# Patient Record
Sex: MALE | Race: WHITE | NOT HISPANIC OR LATINO | Employment: FULL TIME | URBAN - NONMETROPOLITAN AREA
[De-identification: names, ages, dates, MRNs, and addresses within clinical notes are randomized per-mention and may not be internally consistent; named-entity substitution may affect disease eponyms.]

---

## 2018-02-26 ENCOUNTER — HOSPITAL ENCOUNTER (EMERGENCY)
Facility: HOSPITAL | Age: 38
Discharge: HOME | End: 2018-02-26
Attending: EMERGENCY MEDICINE | Admitting: EMERGENCY MEDICINE

## 2018-02-26 ENCOUNTER — APPOINTMENT (EMERGENCY)
Dept: CT IMAGING | Facility: HOSPITAL | Age: 38
End: 2018-02-26

## 2018-02-26 VITALS
RESPIRATION RATE: 21 BRPM | WEIGHT: 210.32 LBS | HEIGHT: 70 IN | DIASTOLIC BLOOD PRESSURE: 68 MMHG | HEART RATE: 93 BPM | OXYGEN SATURATION: 94 % | BODY MASS INDEX: 30.11 KG/M2 | SYSTOLIC BLOOD PRESSURE: 146 MMHG | TEMPERATURE: 99.9 F

## 2018-02-26 DIAGNOSIS — F10.239 SEIZURE DUE TO ALCOHOL WITHDRAWAL (HCC): Primary | ICD-10-CM

## 2018-02-26 DIAGNOSIS — R56.9 SEIZURE DUE TO ALCOHOL WITHDRAWAL (HCC): Primary | ICD-10-CM

## 2018-02-26 LAB
ALBUMIN SERPL BCP-MCNC: 3.7 G/DL (ref 3.5–5)
ALP SERPL-CCNC: 64 U/L (ref 46–116)
ALT SERPL W P-5'-P-CCNC: 41 U/L (ref 12–78)
ANION GAP SERPL CALCULATED.3IONS-SCNC: 18 MMOL/L (ref 4–13)
AST SERPL W P-5'-P-CCNC: 32 U/L (ref 5–45)
BASOPHILS # BLD AUTO: 0.07 THOUSANDS/ΜL (ref 0–0.1)
BASOPHILS NFR BLD AUTO: 1 % (ref 0–1)
BILIRUB SERPL-MCNC: 0.5 MG/DL (ref 0.2–1)
BUN SERPL-MCNC: 9 MG/DL (ref 5–25)
CALCIUM SERPL-MCNC: 8.8 MG/DL (ref 8.3–10.1)
CHLORIDE SERPL-SCNC: 99 MMOL/L (ref 100–108)
CO2 SERPL-SCNC: 22 MMOL/L (ref 21–32)
CREAT SERPL-MCNC: 1.28 MG/DL (ref 0.6–1.3)
EOSINOPHIL # BLD AUTO: 0.13 THOUSAND/ΜL (ref 0–0.61)
EOSINOPHIL NFR BLD AUTO: 1 % (ref 0–6)
ERYTHROCYTE [DISTWIDTH] IN BLOOD BY AUTOMATED COUNT: 12.6 % (ref 11.6–15.1)
ERYTHROCYTE [SEDIMENTATION RATE] IN BLOOD: 8 MM/HOUR (ref 0–10)
GFR SERPL CREATININE-BSD FRML MDRD: 70 ML/MIN/1.73SQ M
GLUCOSE SERPL-MCNC: 109 MG/DL (ref 65–140)
HCT VFR BLD AUTO: 44.7 % (ref 36.5–49.3)
HGB BLD-MCNC: 15.9 G/DL (ref 12–17)
INR PPP: 1 (ref 0.86–1.16)
LIPASE SERPL-CCNC: 249 U/L (ref 73–393)
LYMPHOCYTES # BLD AUTO: 2.44 THOUSANDS/ΜL (ref 0.6–4.47)
LYMPHOCYTES NFR BLD AUTO: 23 % (ref 14–44)
MCH RBC QN AUTO: 32.5 PG (ref 26.8–34.3)
MCHC RBC AUTO-ENTMCNC: 35.6 G/DL (ref 31.4–37.4)
MCV RBC AUTO: 91 FL (ref 82–98)
MONOCYTES # BLD AUTO: 0.84 THOUSAND/ΜL (ref 0.17–1.22)
MONOCYTES NFR BLD AUTO: 8 % (ref 4–12)
NEUTROPHILS # BLD AUTO: 7.25 THOUSANDS/ΜL (ref 1.85–7.62)
NEUTS SEG NFR BLD AUTO: 67 % (ref 43–75)
PLATELET # BLD AUTO: 217 THOUSANDS/UL (ref 149–390)
PMV BLD AUTO: 9.9 FL (ref 8.9–12.7)
POTASSIUM SERPL-SCNC: 3.7 MMOL/L (ref 3.5–5.3)
PROT SERPL-MCNC: 7.4 G/DL (ref 6.4–8.2)
PROTHROMBIN TIME: 13.1 SECONDS (ref 12.1–14.4)
RBC # BLD AUTO: 4.89 MILLION/UL (ref 3.88–5.62)
SODIUM SERPL-SCNC: 139 MMOL/L (ref 136–145)
WBC # BLD AUTO: 10.73 THOUSAND/UL (ref 4.31–10.16)

## 2018-02-26 PROCEDURE — 84145 PROCALCITONIN (PCT): CPT | Performed by: EMERGENCY MEDICINE

## 2018-02-26 PROCEDURE — 96375 TX/PRO/DX INJ NEW DRUG ADDON: CPT

## 2018-02-26 PROCEDURE — 83690 ASSAY OF LIPASE: CPT | Performed by: EMERGENCY MEDICINE

## 2018-02-26 PROCEDURE — 80053 COMPREHEN METABOLIC PANEL: CPT | Performed by: EMERGENCY MEDICINE

## 2018-02-26 PROCEDURE — 85610 PROTHROMBIN TIME: CPT | Performed by: EMERGENCY MEDICINE

## 2018-02-26 PROCEDURE — 70450 CT HEAD/BRAIN W/O DYE: CPT

## 2018-02-26 PROCEDURE — 96361 HYDRATE IV INFUSION ADD-ON: CPT

## 2018-02-26 PROCEDURE — 99285 EMERGENCY DEPT VISIT HI MDM: CPT

## 2018-02-26 PROCEDURE — 86140 C-REACTIVE PROTEIN: CPT | Performed by: EMERGENCY MEDICINE

## 2018-02-26 PROCEDURE — 36415 COLL VENOUS BLD VENIPUNCTURE: CPT | Performed by: EMERGENCY MEDICINE

## 2018-02-26 PROCEDURE — 85652 RBC SED RATE AUTOMATED: CPT | Performed by: EMERGENCY MEDICINE

## 2018-02-26 PROCEDURE — 96374 THER/PROPH/DIAG INJ IV PUSH: CPT

## 2018-02-26 PROCEDURE — 96360 HYDRATION IV INFUSION INIT: CPT

## 2018-02-26 PROCEDURE — 85025 COMPLETE CBC W/AUTO DIFF WBC: CPT | Performed by: EMERGENCY MEDICINE

## 2018-02-26 RX ORDER — CHLORDIAZEPOXIDE HYDROCHLORIDE 25 MG/1
25 CAPSULE, GELATIN COATED ORAL 3 TIMES DAILY PRN
Qty: 30 CAPSULE | Refills: 0 | Status: SHIPPED | OUTPATIENT
Start: 2018-02-26 | End: 2018-04-24

## 2018-02-26 RX ORDER — DIAZEPAM 5 MG/ML
10 INJECTION, SOLUTION INTRAMUSCULAR; INTRAVENOUS ONCE
Status: COMPLETED | OUTPATIENT
Start: 2018-02-26 | End: 2018-02-26

## 2018-02-26 RX ORDER — DEXTROSE AND SODIUM CHLORIDE 5; .45 G/100ML; G/100ML
100 INJECTION, SOLUTION INTRAVENOUS CONTINUOUS
Status: DISCONTINUED | OUTPATIENT
Start: 2018-02-26 | End: 2018-02-26 | Stop reason: HOSPADM

## 2018-02-26 RX ORDER — ONDANSETRON 2 MG/ML
4 INJECTION INTRAMUSCULAR; INTRAVENOUS ONCE
Status: COMPLETED | OUTPATIENT
Start: 2018-02-26 | End: 2018-02-26

## 2018-02-26 RX ADMIN — DEXTROSE AND SODIUM CHLORIDE 100 ML/HR: 5; 450 INJECTION, SOLUTION INTRAVENOUS at 18:51

## 2018-02-26 RX ADMIN — DIAZEPAM 10 MG: 5 INJECTION, SOLUTION INTRAMUSCULAR; INTRAVENOUS at 18:46

## 2018-02-26 RX ADMIN — ONDANSETRON 4 MG: 2 INJECTION INTRAMUSCULAR; INTRAVENOUS at 18:46

## 2018-02-26 RX ADMIN — SODIUM CHLORIDE 1000 ML: 0.9 INJECTION, SOLUTION INTRAVENOUS at 18:46

## 2018-02-26 NOTE — ED PROVIDER NOTES
History  Chief Complaint   Patient presents with    Alcoholic Seizure     had a seizure for 5 minutes today x2 from not drinking alcohol  64-year-old male patient presents emergency department for evaluation of new onset seizures  According to people who saw the seizures the patient had two seizure episodes within 5 minutes of each other without regaining any type of consciousness  Upon arrival here, the patient's glassy eyed, alert, aware, is able to answer questions clearly  When asked if he does any does street drugs the patient denies this however he does admit to drinking alcohol  The patient states he drinks approximately 10 drinks on any given day  The patient stated that he did have a day of not drinking this weekend and had no symptoms of that  However, the patient does state drinking daily and a minimum of 10 drinks leading me to believe that this is likely an alcoholic withdrawal seizure  Plan for evaluation for the patient's new onset seizures will include  differential diagnosis but not limited to the followin  Alcohol withdrawal seizures 2  New onset epileptic seizures 3  Seizures from previous head injury  In talking with the patient at the time of discharge, the patient states that he has been out of work for the last month  During that time, he states he has been drinking more alcohol than normal  Today, was his first day back at work, states not been drinking as much as he had previously been believe this to be the causes the patient seizures          History provided by:  Patient   used: No    Seizure - New Onset   Seizure type:  Myoclonic  Preceding symptoms: no sensation of an aura present, no dizziness, no hyperventilation, no nausea, no numbness and no vision change    Initial focality:  None  Episode characteristics: abnormal movements and partial responsiveness    Episode characteristics: no combativeness    Postictal symptoms: confusion Return to baseline: yes    Timing:  Clustered  Progression:  Unchanged  Context: alcohol withdrawal        None       Past Medical History:   Diagnosis Date    Asthma     GERD (gastroesophageal reflux disease)     Hypertension        Past Surgical History:   Procedure Laterality Date    CYST REMOVAL         History reviewed  No pertinent family history  I have reviewed and agree with the history as documented  Social History   Substance Use Topics    Smoking status: Current Every Day Smoker     Packs/day: 0 00    Smokeless tobacco: Never Used    Alcohol use Yes        Review of Systems   Neurological: Positive for seizures  All other systems reviewed and are negative  Physical Exam  ED Triage Vitals [02/26/18 1830]   Temperature Pulse Respirations BP SpO2   99 9 °F (37 7 °C) (!) 119 20 -- 96 %      Temp Source Heart Rate Source Patient Position - Orthostatic VS BP Location FiO2 (%)   Temporal Monitor Lying Left arm --      Pain Score       --           Orthostatic Vital Signs  Vitals:    02/26/18 1830   Pulse: (!) 119   Patient Position - Orthostatic VS: Lying       Physical Exam   Constitutional: He is oriented to person, place, and time  He appears well-developed and well-nourished  No distress  HENT:   Head: Normocephalic and atraumatic  Right Ear: External ear normal    Left Ear: External ear normal    Eyes: Conjunctivae and EOM are normal  Right eye exhibits no discharge  Left eye exhibits no discharge  No scleral icterus  Neck: Normal range of motion  Neck supple  No JVD present  No tracheal deviation present  No thyromegaly present  Cardiovascular: Normal rate and regular rhythm  Pulmonary/Chest: Effort normal and breath sounds normal  No stridor  No respiratory distress  He has no wheezes  He has no rales  Abdominal: Soft  Bowel sounds are normal  He exhibits no distension  There is no tenderness  Musculoskeletal: Normal range of motion   He exhibits no edema, tenderness or deformity  Neurological: He is alert and oriented to person, place, and time  No cranial nerve deficit  Coordination normal    Skin: Skin is warm and dry  He is not diaphoretic  Psychiatric: He has a normal mood and affect  His behavior is normal    Nursing note and vitals reviewed        ED Medications  Medications   sodium chloride 0 9 % bolus 1,000 mL (1,000 mL Intravenous New Bag 2/26/18 1846)   dextrose 5 % and sodium chloride 0 45 % infusion (not administered)   diazepam (VALIUM) injection 10 mg (10 mg Intravenous Given 2/26/18 1846)   ondansetron (ZOFRAN) injection 4 mg (4 mg Intravenous Given 2/26/18 1846)       Diagnostic Studies  Results Reviewed     Procedure Component Value Units Date/Time    CBC and differential [[id number]] Collected:  02/26/18 1845    Lab Status:  No result Specimen:  Blood from Arm, Left     Comprehensive metabolic panel [[id number]] Collected:  02/26/18 1845    Lab Status:  No result Specimen:  Blood from Arm, Left     Protime-INR [[id number]] Collected:  02/26/18 1845    Lab Status:  No result Specimen:  Blood from Arm, Left     Lipase [[id number]] Collected:  02/26/18 1845    Lab Status:  No result Specimen:  Blood from Arm, Left     Procalcitonin [[id number]] Collected:  02/26/18 1845    Lab Status:  No result Specimen:  Blood from Arm, Left     Sedimentation rate, automated [[id number]] Collected:  02/26/18 1845    Lab Status:  No result Specimen:  Blood from Arm, Left     C-reactive protein [[id number]] Collected:  02/26/18 1845    Lab Status:  No result Specimen:  Blood from Arm, Left                  CT head without contrast    (Results Pending)              Procedures  Procedures       Phone Contacts  ED Phone Contact    ED Course  ED Course                                MDM  Number of Diagnoses or Management Options  Seizure due to alcohol withdrawal [facility]): new and requires workup     Amount and/or Complexity of Data Reviewed  Clinical lab tests: ordered and reviewed  Tests in the radiology section of CPT®: ordered and reviewed  Decide to obtain previous medical records or to obtain history from someone other than the patient: yes  Review and summarize past medical records: yes    Patient Progress  Patient progress: stable    CritCare Time    Disposition  Final diagnoses:   None     ED Disposition     None      Follow-up Information    None       Patient's Medications    No medications on file     No discharge procedures on file      ED Provider  Electronically Signed by           Adeel Azul DO  02/28/18 8256

## 2018-02-27 LAB — CRP SERPL QL: <3 MG/L

## 2018-02-27 NOTE — ED NOTES
Pt laying in bed with no complaints  Pt resting comfortably  NO pain or seizures at this time  Call bell in reach   Wife at [address] Wu Mckinley RN  02/26/18 2032

## 2018-02-27 NOTE — ED NOTES
Pt has no complaints at this time  Seizure precautions taken  No pain at this time  Call bell in reach   Wife at [address] [facility] Doroteo Marlow RN  02/26/18 3482

## 2018-02-27 NOTE — ED NOTES
Pt has 15cm red scrape on left middle back and a 5cm bump on left side of the back of head  Pt states they do not hurt   And he does not recall what happened      Margaret Treadwell RN  02/26/18 8388

## 2018-02-27 NOTE — DISCHARGE INSTRUCTIONS
Alcohol Withdrawal   WHAT YOU NEED TO KNOW:   Alcohol withdrawal is a group of symptoms that occur when you drink alcohol daily and suddenly stop drinking  It can begin within 5 hours of your last drink and gets worse over 2 to 3 days  Withdrawal may also happen if you suddenly reduce the amount of alcohol that you normally drink  DISCHARGE INSTRUCTIONS:   Call 911 for any of the following:   · You feel like you want to harm yourself or others  Return to the emergency department if:   · You have sudden chest pain or trouble breathing  · Your breathing or heartbeat is faster than usual     · You pass out or think you had a seizure  · You are confused, hallucinating, or extremely agitated  · You cannot stop vomiting, or you vomit blood  · You are shaking and it does not get better after you take your medicine  Contact your healthcare provider if:   · You keep drinking to avoid alcohol withdrawal symptoms  · You need help to stop drinking alcohol  · You have trouble with work, relationships, or school because you drink too much alcohol  · You get into fights because of alcohol  · You have questions or concerns about your condition or care  Medicines:   · Medicines  may be given to calm you and help manage your symptoms  Vitamin supplements, such as thiamine, may be recommended because high alcohol intake can keep your body from absorbing enough vitamins from food  · Take your medicine as directed  Contact your healthcare provider if you think your medicine is not helping or if you have side effects  Tell him of her if you are allergic to any medicine  Keep a list of the medicines, vitamins, and herbs you take  Include the amounts, and when and why you take them  Bring the list or the pill bottles to follow-up visits  Carry your medicine list with you in case of an emergency    Have someone stay with you during withdrawal:  This person should help you take your medicine and keep you in a calm, quiet environment  He should also watch your symptoms and know what to do if your symptoms get worse  Follow up with your healthcare provider within 1 day:  Write down your questions so you remember to ask them during your visits  Learn to stop drinking alcohol safely:  Work with your healthcare provider to develop a plan for you to stop drinking safely  A sudden stop or change can be life-threatening  For support and more information:   · Alcoholics Anonymous  Web Address: http://www WonderHow[employer]/  © 2017 [address] [employer] Information is for End User's use only and may not be sold, redistributed or otherwise used for commercial purposes  All illustrations and images included in CareNotes® are the copyrighted property of A D A M , Inc  or [address]  The above information is an  only  It is not intended as medical advice for individual conditions or treatments  Talk to your doctor, nurse or pharmacist before following any medical regimen to see if it is safe and effective for you

## 2018-02-27 NOTE — ED NOTES
Pt updated on status  No s/s of seizure activity  No complaints at this time  Pt states he has no pain  Call bell in reach  Wife at bedside  Seizure precautions taken        Gerardo Lucia RN  02/26/18 4754

## 2018-02-28 LAB — PROCALCITONIN: <0.02 NG/ML (ref 0–0.08)

## 2018-03-04 ENCOUNTER — HOSPITAL ENCOUNTER (EMERGENCY)
Facility: HOSPITAL | Age: 38
Discharge: HOME | End: 2018-03-04
Attending: EMERGENCY MEDICINE | Admitting: EMERGENCY MEDICINE

## 2018-03-04 ENCOUNTER — APPOINTMENT (EMERGENCY)
Dept: ULTRASOUND IMAGING | Facility: HOSPITAL | Age: 38
End: 2018-03-04
Payer: COMMERCIAL

## 2018-03-04 VITALS
HEIGHT: 70 IN | HEART RATE: 82 BPM | SYSTOLIC BLOOD PRESSURE: 142 MMHG | BODY MASS INDEX: 30.36 KG/M2 | TEMPERATURE: 98.3 F | RESPIRATION RATE: 16 BRPM | DIASTOLIC BLOOD PRESSURE: 79 MMHG | OXYGEN SATURATION: 94 % | WEIGHT: 212.08 LBS

## 2018-03-04 DIAGNOSIS — I80.8 SUPERFICIAL PHLEBITIS OF ARM: Primary | ICD-10-CM

## 2018-03-04 PROCEDURE — 99283 EMERGENCY DEPT VISIT LOW MDM: CPT

## 2018-03-04 PROCEDURE — 93971 EXTREMITY STUDY: CPT

## 2018-03-04 RX ORDER — NAPROXEN 500 MG/1
500 TABLET ORAL 2 TIMES DAILY WITH MEALS
Qty: 20 TABLET | Refills: 0 | Status: SHIPPED | OUTPATIENT
Start: 2018-03-04 | End: 2018-04-24

## 2018-03-04 NOTE — ED PROVIDER NOTES
History  Chief Complaint   Patient presents with    Arm Pain     Pt reports he had an IV in his right arm one week ago  Pt reports pain in ac area down towards wrist       Pt was seen 6 days ago in ER for seizure and had IV in right anti cubital area  -was discharged in few hrs and went home  Pt has had continued tenderness/firness at forearm distal from IV site  No redness or warmth  Pt with no numbness/tingling or loss of function  No rash  No fever/chills  No elbow/wrist /shoulder or neck Sx  History provided by:  Patient  Arm Pain   Onset quality:  Gradual  Timing:  Constant  Chronicity:  New  Associated symptoms: no abdominal pain, no chest pain, no congestion, no cough, no fever, no headaches, no loss of consciousness, no myalgias, no nausea, no rash, no shortness of breath, no sore throat, no vomiting and no wheezing        Prior to Admission Medications   Prescriptions Last Dose Informant Patient Reported? Taking?   chlordiazePOXIDE (LIBRIUM) 25 mg capsule 3/4/2018 at Unknown time  No Yes   Sig: Take 1 capsule (25 mg total) by mouth 3 (three) times a day as needed for anxiety for up to 10 days   Patient taking differently: Take 25 mg by mouth 3 (three) times a day        Facility-Administered Medications: None       Past Medical History:   Diagnosis Date    Alcohol abuse     Asthma     GERD (gastroesophageal reflux disease)     Hypertension        Past Surgical History:   Procedure Laterality Date    CARPAL TUNNEL RELEASE Bilateral     CYST REMOVAL         History reviewed  No pertinent family history  I have reviewed and agree with the history as documented      Social History   Substance Use Topics    Smoking status: Current Every Day Smoker     Packs/day: 2 00     Types: Cigarettes    Smokeless tobacco: Never Used    Alcohol use Yes      Comment: Pt is on medication to decrease use        Review of Systems   Constitutional: Negative for activity change, appetite change, chills, diaphoresis and fever  HENT: Negative  Negative for congestion, drooling, facial swelling, mouth sores, sore throat, trouble swallowing and voice change  Eyes: Negative  Negative for pain and visual disturbance  Respiratory: Negative  Negative for cough, choking, chest tightness, shortness of breath and wheezing  Cardiovascular: Negative for chest pain  Gastrointestinal: Negative  Negative for abdominal pain, nausea and vomiting  Genitourinary: Negative  Musculoskeletal: Negative  Negative for arthralgias, back pain, joint swelling, myalgias, neck pain and neck stiffness  Skin: Negative for pallor and rash  Neurological: Negative  Negative for dizziness, tremors, loss of consciousness, syncope, facial asymmetry, speech difficulty, light-headedness and headaches  Psychiatric/Behavioral: Negative for behavioral problems, confusion, hallucinations and self-injury  The patient is not nervous/anxious  All other systems reviewed and are negative  Physical Exam  ED Triage Vitals [03/04/18 1835]   Temperature Pulse Respirations Blood Pressure SpO2   98 3 °F (36 8 °C) 77 17 (!) 180/87 96 %      Temp Source Heart Rate Source Patient Position - Orthostatic VS BP Location FiO2 (%)   Temporal Monitor Sitting Left arm --      Pain Score       6           Orthostatic Vital Signs  Vitals:    03/04/18 1835 03/04/18 1900   BP: (!) 180/87 142/79   Pulse: 77 82   Patient Position - Orthostatic VS: Sitting Lying       Physical Exam   Constitutional: He is oriented to person, place, and time  He appears well-developed and well-nourished  He is active and cooperative  Non-toxic appearance  He does not have a sickly appearance  He does not appear ill  No distress  HENT:   Head: Normocephalic and atraumatic  Right Ear: Hearing normal    Left Ear: Hearing normal    Mouth/Throat: Oropharynx is clear and moist  Mucous membranes are not dry and not cyanotic   No oropharyngeal exudate, posterior oropharyngeal edema or posterior oropharyngeal erythema  Eyes: Conjunctivae and EOM are normal  Pupils are equal, round, and reactive to light  Neck: Normal range of motion and phonation normal  Neck supple  No JVD present  No spinous process tenderness and no muscular tenderness present  Normal range of motion present  Cardiovascular: Normal rate, regular rhythm, intact distal pulses and normal pulses  No extrasystoles are present  No perf edema or calf tenderness   Pulmonary/Chest: Effort normal  No accessory muscle usage or stridor  No tachypnea  No respiratory distress  He has decreased breath sounds  He has no wheezes  He has no rhonchi  He has no rales  Abdominal: Soft  Bowel sounds are normal  There is no tenderness  There is no rigidity, no guarding and no CVA tenderness  Musculoskeletal:        Right shoulder: Normal         Right elbow: Normal        Right wrist: Normal         Cervical back: Normal  He exhibits normal pulse  Right forearm: He exhibits tenderness  He exhibits no bony tenderness, no swelling and no edema  Arms:  Lymphadenopathy:     He has no cervical adenopathy  He has no axillary adenopathy  Neurological: He is alert and oriented to person, place, and time  He has normal strength and normal reflexes  No cranial nerve deficit  Skin: Skin is warm and dry  Capillary refill takes less than 2 seconds  No abrasion, no ecchymosis, no petechiae and no rash noted  He is not diaphoretic  No cyanosis  No pallor  Psychiatric: He has a normal mood and affect  His speech is normal and behavior is normal  Thought content normal  Cognition and memory are normal    Vitals reviewed        ED Medications  Medications - No data to display    Diagnostic Studies  Results Reviewed     None                 VAS upper limb venous duplex scan, unilateral/limited   Final Result by Jose Manuel Palumbo MD (03/05 1851)                 Procedures  Procedures       Phone Contacts  ED Phone Contact    ED Course  ED Course                                MDM  CritCare Time    Disposition  Final diagnoses:   Superficial phlebitis of arm - right forearm s/p iv     Time reflects when diagnosis was documented in both MDM as applicable and the Disposition within this note     Time User Action Codes Description Comment    3/4/2018  8:22 PM Guido [place] Add [I80 8] Superficial phlebitis of arm     3/4/2018  8:24 PM Guido [place] Modify [I80 8] Superficial phlebitis of arm right forearm s/p iv      ED Disposition     ED Disposition Condition Comment    Discharge  Dat discharge to home/self care  Condition at discharge: Stable        Follow-up Information     Follow up With Specialties Details Why Theodore Asher [id number], [facility] Physician Assistant   [address], [place] [id number] [phone]          Discharge Medication List as of 3/4/2018  8:27 PM      CONTINUE these medications which have NOT CHANGED    Details   chlordiazePOXIDE (LIBRIUM) 25 mg capsule Take 1 capsule (25 mg total) by mouth 3 (three) times a day as needed for anxiety for up to 10 days, Starting Mon 2/26/2018, Until Thu 3/8/2018, Print           No discharge procedures on file      ED Provider  Electronically Signed by           Cherie Payne, DO  03/05/18 4127 [facility], DO  03/05/18 2544

## 2018-03-05 PROCEDURE — 93971 EXTREMITY STUDY: CPT | Performed by: SURGERY

## 2018-03-05 NOTE — DISCHARGE INSTRUCTIONS
Phlebitis   WHAT YOU NEED TO KNOW:   Phlebitis is inflammation of the wall of your vein  Inflammation may be caused by damage to or infection of your vein  Phlebitis may occur in a vein in your arm or leg  Symptoms include pain, redness, and swelling near the vein  Symptoms may appear when you are receiving an IV medication, or 48 to 96 hours after you receive the medicine  DISCHARGE INSTRUCTIONS:   Seek care immediately if:   · Your leg or arm turns pale or blue  · Your leg or arm feels hot or cold  · Your arm or leg feels warm, tender, and painful  It may look swollen and red  Contact your healthcare provider or hematologist if:   · You have a fever  · You have more pain, swelling, or warmth near your vein  · Your symptoms do not improve within 72 hours  · You have questions or concerns about your condition or care  Medicines:   · NSAIDs , such as ibuprofen, help decrease swelling, pain, and fever  NSAIDs can cause stomach bleeding or kidney problems in certain people  If you take blood thinner medicine, always ask your healthcare provider if NSAIDs are safe for you  Always read the medicine label and follow directions  · Take your medicine as directed  Contact your healthcare provider if you think your medicine is not helping or if you have side effects  Tell him of her if you are allergic to any medicine  Keep a list of the medicines, vitamins, and herbs you take  Include the amounts, and when and why you take them  Bring the list or the pill bottles to follow-up visits  Carry your medicine list with you in case of an emergency  Follow up with your healthcare provider as directed:  Write down your questions so you remember to ask them during your visits  Manage your symptoms:   · Apply a warm compress to your vein  This will help decrease swelling and pain  Wet a washcloth in warm water  Do not  use hot water  Apply the warm compress for 10 minutes  Repeat this 4 times each day  · Elevate your leg or arm above the level of your heart as often as you can  This will help decrease swelling and pain  Prop your leg or arm on pillows or blankets to keep it elevated comfortably  · Wear pressure stockings if directed  Pressure stockings improve blood flow and help decrease pain and swelling  Pressure stockings can also help decrease your risk for blood clots in your legs  Wear the stockings during the day  Do not wear them overnight when you sleep  · Do not inject illegal drugs  Talk to your healthcare provider if you use IV drugs and need help to quit  © 2017 [facility] [facility] Information is for End User's use only and may not be sold, redistributed or otherwise used for commercial purposes  All illustrations and images included in CareNotes® are the copyrighted property of A D A M , Inc  or [employer]  The above information is an  only  It is not intended as medical advice for individual conditions or treatments  Talk to your doctor, nurse or pharmacist before following any medical regimen to see if it is safe and effective for you

## 2018-03-21 ENCOUNTER — OFFICE VISIT (OUTPATIENT)
Dept: FAMILY MEDICINE CLINIC | Facility: CLINIC | Age: 38
End: 2018-03-21
Payer: COMMERCIAL

## 2018-03-21 VITALS
HEIGHT: 69 IN | HEART RATE: 98 BPM | OXYGEN SATURATION: 96 % | DIASTOLIC BLOOD PRESSURE: 98 MMHG | SYSTOLIC BLOOD PRESSURE: 144 MMHG | BODY MASS INDEX: 30.96 KG/M2 | RESPIRATION RATE: 18 BRPM | TEMPERATURE: 97 F | WEIGHT: 209 LBS

## 2018-03-21 DIAGNOSIS — R03.0 ELEVATED BLOOD PRESSURE READING: ICD-10-CM

## 2018-03-21 DIAGNOSIS — F10.230 ALCOHOL WITHDRAWAL SEIZURE WITHOUT COMPLICATION (HCC): Primary | ICD-10-CM

## 2018-03-21 DIAGNOSIS — R56.9 ALCOHOL WITHDRAWAL SEIZURE WITHOUT COMPLICATION (HCC): Primary | ICD-10-CM

## 2018-03-21 PROCEDURE — T1015 CLINIC SERVICE: HCPCS | Performed by: FAMILY MEDICINE

## 2018-03-21 NOTE — PROGRESS NOTES
OFFICE VISIT  Mirta Ackerman 42 y o  male MRN: [id number]      Assessment / Plan:  Diagnoses and all orders for this visit:    Alcohol withdrawal seizure without complication [facility])  -     Ambulatory referral to Neurology; Future  -     EEG awake or drowsy routine; Future    Elevated blood pressure reading      Obtain blood pressure cuff, record readings at bring at next office visit  Reviewed normal readings, if blood pressure is over 150/90, pt to call office  Lifestyle changes advised, smoking cessation discussed  Reason For Visit / Chief Complaint  Chief Complaint   Patient presents with   [address] [address] [place] states he had a seizure and went to the ER        HPI:  Mirta Ackerman is a 42 y o  male who presents today for follow up from Ed  Pt was seen at [facility] for seizure activity  Pt reports drinking 12 beers daily for over 10 years  He then stated he stopped drinking for three days  Significant other witness seizure around dinner time  She reports him walking into the room, falling, convulsing, foaming at the mouth  911 was called  Pt has no recollection of events  At presnt time, pt reports no further seizures  He is a 2 pack smoker a day       Historical Information   Past Medical History:   Diagnosis Date    Alcohol abuse     Asthma     GERD (gastroesophageal reflux disease)     Hypertension      Past Surgical History:   Procedure Laterality Date    CARPAL TUNNEL RELEASE Bilateral     CYST REMOVAL       Social History   History   Alcohol Use    Yes     Comment: Pt is on medication to decrease use     History   Drug Use No     History   Smoking Status    Current Every Day Smoker    Packs/day: 2 00    Types: Cigarettes   Smokeless Tobacco    Never Used     Family History   Problem Relation Age of Onset    Stroke Father     Heart attack Father        Meds/Allergies   Allergies   Allergen Reactions    Because [Nonoxynol 9]        Meds:    Current Outpatient Prescriptions:    chlordiazePOXIDE (LIBRIUM) 25 mg capsule, Take 1 capsule (25 mg total) by mouth 3 (three) times a day as needed for anxiety for up to 10 days (Patient taking differently: Take 25 mg by mouth 3 (three) times a day  ), Disp: 30 capsule, Rfl: 0    naproxen (NAPROSYN) 500 mg tablet, Take 1 tablet (500 mg total) by mouth 2 (two) times a day with meals, Disp: 20 tablet, Rfl: 0      REVIEW OF SYSTEMS  A comprehensive review of systems was negative  Current Vitals:   Blood Pressure: 144/98 (03/21/18 0840)  Pulse: 98 (03/21/18 0840)  Temperature: (!) 97 °F (36 1 °C) (03/21/18 0840)  Respirations: 18 (03/21/18 0840)  Height: 5' 9" (175 3 cm) (03/21/18 0840)  Weight - Scale: 94 8 kg (209 lb) (03/21/18 0840)  SpO2: 96 % (03/21/18 0840)  [unfilled]    PHYSICAL EXAMS:  General appearance: alert and oriented, in no acute distress  Lungs: clear to auscultation bilaterally  Heart: regular rate and rhythm, S1, S2 normal, no murmur, click, rub or gallop  Skin: Skin color, texture, turgor normal  No rashes or lesions  Neurologic: Grossly normal{YES/NO:20        Follow up at this office in one month    Counseling / Coordination of Care  Total floor / unit time spent today 20 minutes  Greater than 50% of total time was spent with the patient and / or family counseling and / or coordination of care

## 2018-03-21 NOTE — LETTER
March 21, 2018     Patient: Allen Schofield   YOB: 1980   Date of Visit: 3/21/2018       To Whom it May Concern:    Ximena Mendez is under my professional care  He was seen in my office on 3/21/2018  He may return to school on 03/21/2018  If you have any questions or concerns, please don't hesitate to call           Sincerely,          STACEY Ayoub        CC: No Recipients

## 2018-04-09 ENCOUNTER — HOSPITAL ENCOUNTER (OUTPATIENT)
Dept: NEUROLOGY | Facility: HOSPITAL | Age: 38
Discharge: HOME | End: 2018-04-09
Payer: COMMERCIAL

## 2018-04-09 DIAGNOSIS — F10.230 ALCOHOL WITHDRAWAL SEIZURE WITHOUT COMPLICATION (HCC): ICD-10-CM

## 2018-04-09 DIAGNOSIS — R56.9 ALCOHOL WITHDRAWAL SEIZURE WITHOUT COMPLICATION (HCC): ICD-10-CM

## 2018-04-09 PROCEDURE — 95816 EEG AWAKE AND DROWSY: CPT | Performed by: PSYCHIATRY & NEUROLOGY

## 2018-04-09 PROCEDURE — 95816 EEG AWAKE AND DROWSY: CPT

## 2018-04-24 ENCOUNTER — HOSPITAL ENCOUNTER (OUTPATIENT)
Dept: NON INVASIVE DIAGNOSTICS | Facility: HOSPITAL | Age: 38
Discharge: HOME | End: 2018-04-24
Payer: COMMERCIAL

## 2018-04-24 ENCOUNTER — OFFICE VISIT (OUTPATIENT)
Dept: FAMILY MEDICINE CLINIC | Facility: CLINIC | Age: 38
End: 2018-04-24
Payer: COMMERCIAL

## 2018-04-24 VITALS
OXYGEN SATURATION: 96 % | SYSTOLIC BLOOD PRESSURE: 152 MMHG | WEIGHT: 209 LBS | HEART RATE: 88 BPM | RESPIRATION RATE: 18 BRPM | HEIGHT: 69 IN | TEMPERATURE: 97 F | DIASTOLIC BLOOD PRESSURE: 92 MMHG | BODY MASS INDEX: 30.96 KG/M2

## 2018-04-24 DIAGNOSIS — I10 ESSENTIAL HYPERTENSION: ICD-10-CM

## 2018-04-24 DIAGNOSIS — G54.2 CERVICAL NEUROPATHY: ICD-10-CM

## 2018-04-24 DIAGNOSIS — I49.9 IRREGULAR HEART BEAT: ICD-10-CM

## 2018-04-24 DIAGNOSIS — F10.230 ALCOHOL WITHDRAWAL SEIZURE WITHOUT COMPLICATION (HCC): Primary | ICD-10-CM

## 2018-04-24 DIAGNOSIS — R56.9 ALCOHOL WITHDRAWAL SEIZURE WITHOUT COMPLICATION (HCC): Primary | ICD-10-CM

## 2018-04-24 LAB
ATRIAL RATE: 74 BPM
P AXIS: 65 DEGREES
PR INTERVAL: 152 MS
QRS AXIS: 76 DEGREES
QRSD INTERVAL: 104 MS
QT INTERVAL: 416 MS
QTC INTERVAL: 461 MS
T WAVE AXIS: 68 DEGREES
VENTRICULAR RATE: 74 BPM

## 2018-04-24 PROCEDURE — T1015 CLINIC SERVICE: HCPCS | Performed by: FAMILY MEDICINE

## 2018-04-24 PROCEDURE — 93010 ELECTROCARDIOGRAM REPORT: CPT | Performed by: INTERNAL MEDICINE

## 2018-04-24 PROCEDURE — 93005 ELECTROCARDIOGRAM TRACING: CPT

## 2018-04-24 RX ORDER — AMLODIPINE BESYLATE 10 MG/1
10 TABLET ORAL DAILY
Qty: 30 TABLET | Refills: 1 | Status: SHIPPED | OUTPATIENT
Start: 2018-04-24 | End: 2018-07-31 | Stop reason: SDUPTHER

## 2018-04-24 NOTE — LETTER
April 24, 2018     Patient: Sunitha Faulkner   YOB: 1980   Date of Visit: 4/24/2018       To Whom it May Concern:    Murphy Parra is under my professional care  He was seen in my office on 4/24/2018  He may return to work on 04/25/2018  If you have any questions or concerns, please don't hesitate to call           Sincerely,          STACEY Carlos        CC: No Recipients

## 2018-04-24 NOTE — PROGRESS NOTES
OFFICE VISIT  Kelsey Curry 42 y o  male MRN: [id number]      Assessment / Plan:  Diagnoses and all orders for this visit:    Alcohol withdrawal seizure without complication ([facility] [address] )    Essential hypertension  -     amLODIPine (NORVASC) 10 mg tablet; Take 1 tablet (10 mg total) by mouth daily  -     Ambulatory referral to Cardiology; Future    Cervical neuropathy  -     EMG 1 Limb; Future    Irregular heart beat  -     ECG 12 lead; Future  -     Holter monitor - 24 hour; Future          Reason For Visit / Chief Complaint  Chief Complaint   Patient presents with    Follow-up     Test results        HPI:  Kelsey Curry is a 42 y o  male presents today for follow up  Pt was seen one time prior for follow up from Ed  Pt was one time seizure, most likely from alcohol withdraw  He had an EEG completed in which was normal, He reports no other seizure activity, he has established appt with neurology on June 11th    While in ED he has found to have elevated Bp  He has been tracking his blood pressures,  Pressures range from 163/115-172/123  Strong family hx of CAD  He consumes 2-6 cans of beer nightly  He has complaints of dizziness, and headaches on and off, particular around his scarring from an accident   He is a , today he reports numbess to his hands/arms, He reports when he raises his arms, his fingertips go numb, prior carpel tunnel hx on both wrist      Historical Information   Past Medical History:   Diagnosis Date    Alcohol abuse     Asthma     GERD (gastroesophageal reflux disease)     Hypertension      Past Surgical History:   Procedure Laterality Date    CARPAL TUNNEL RELEASE Bilateral     CYST REMOVAL       Social History   History   Alcohol Use    Yes     Comment: Pt is on medication to decrease use     History   Drug Use No     History   Smoking Status    Current Every Day Smoker    Packs/day: 2 00    Types: Cigarettes   Smokeless Tobacco    Never Used     Family History   Problem Relation Age of Onset    Stroke Father     Heart attack Father        Meds/Allergies   Allergies   Allergen Reactions    Because [Nonoxynol 9]        Meds:    Current Outpatient Prescriptions:     amLODIPine (NORVASC) 10 mg tablet, Take 1 tablet (10 mg total) by mouth daily, Disp: 30 tablet, Rfl: 1      REVIEW OF SYSTEMS  A comprehensive review of systems was negative except for: Neurological: positive for Symptoms; Neuro: dizziness, headaches and paresthesia      Current Vitals:   Blood Pressure: 152/92 (04/24/18 0726)  Pulse: 88 (04/24/18 0726)  Temperature: (!) 97 °F (36 1 °C) (04/24/18 0726)  Respirations: 18 (04/24/18 0726)  Height: 5' 9" (175 3 cm) (04/24/18 0726)  Weight - Scale: 94 8 kg (209 lb) (04/24/18 0726)  SpO2: 96 % (04/24/18 0726)  [unfilled]    PHYSICAL EXAMS:  General appearance: alert and oriented, in no acute distress  Lungs: clear to auscultation bilaterally  Heart: regularly irregular rhythm  Extremities: extremities normal, warm and well-perfused; no cyanosis, clubbing, or edema  Pulses: 2+ and symmetric  Skin: Skin color, texture, turgor normal  No rashes or lesions  Neurologic: Grossly normal{YES/NO:20        Follow up at this office in 2 weeks     Counseling / Coordination of Care  Total floor / unit time spent today 20 minutes  Greater than 50% of total time was spent with the patient and / or family counseling and / or coordination of care

## 2018-04-26 ENCOUNTER — HOSPITAL ENCOUNTER (OUTPATIENT)
Dept: NON INVASIVE DIAGNOSTICS | Facility: HOSPITAL | Age: 38
Discharge: HOME | End: 2018-04-26
Payer: COMMERCIAL

## 2018-04-26 DIAGNOSIS — I49.9 IRREGULAR HEART BEAT: ICD-10-CM

## 2018-04-26 PROCEDURE — 93225 XTRNL ECG REC<48 HRS REC: CPT

## 2018-04-26 PROCEDURE — 93226 XTRNL ECG REC<48 HR SCAN A/R: CPT

## 2018-04-29 PROCEDURE — 93227 XTRNL ECG REC<48 HR R&I: CPT | Performed by: INTERNAL MEDICINE

## 2018-05-02 DIAGNOSIS — I49.3 VENTRICULAR ECTOPIC BEATS: Primary | ICD-10-CM

## 2018-05-03 ENCOUNTER — TRANSCRIBE ORDERS (OUTPATIENT)
Dept: SLEEP CENTER | Facility: CLINIC | Age: 38
End: 2018-05-03

## 2018-05-08 DIAGNOSIS — M54.2 CERVICALGIA: Primary | ICD-10-CM

## 2018-05-14 RX ORDER — METOPROLOL SUCCINATE 50 MG/1
50 TABLET, EXTENDED RELEASE ORAL DAILY
Refills: 0 | COMMUNITY
Start: 2018-05-09 | End: 2019-02-08 | Stop reason: SDUPTHER

## 2018-05-15 ENCOUNTER — OFFICE VISIT (OUTPATIENT)
Dept: FAMILY MEDICINE CLINIC | Facility: CLINIC | Age: 38
End: 2018-05-15
Payer: COMMERCIAL

## 2018-05-15 VITALS
WEIGHT: 208 LBS | HEART RATE: 63 BPM | TEMPERATURE: 97 F | BODY MASS INDEX: 30.81 KG/M2 | OXYGEN SATURATION: 93 % | HEIGHT: 69 IN | DIASTOLIC BLOOD PRESSURE: 83 MMHG | SYSTOLIC BLOOD PRESSURE: 130 MMHG

## 2018-05-15 DIAGNOSIS — M54.2 CERVICALGIA: ICD-10-CM

## 2018-05-15 DIAGNOSIS — H66.003 ACUTE SUPPURATIVE OTITIS MEDIA OF BOTH EARS WITHOUT SPONTANEOUS RUPTURE OF TYMPANIC MEMBRANES, RECURRENCE NOT SPECIFIED: Primary | ICD-10-CM

## 2018-05-15 DIAGNOSIS — I49.9 IRREGULAR HEART RHYTHM: ICD-10-CM

## 2018-05-15 PROCEDURE — T1015 CLINIC SERVICE: HCPCS | Performed by: FAMILY MEDICINE

## 2018-05-15 RX ORDER — AZITHROMYCIN 250 MG/1
250 TABLET, FILM COATED ORAL DAILY
Qty: 6 TABLET | Refills: 0 | Status: SHIPPED | OUTPATIENT
Start: 2018-05-15 | End: 2018-05-20

## 2018-05-15 NOTE — PROGRESS NOTES
OFFICE VISIT  Kaitlynn Butcher 42 y o  male MRN: [id number]      Assessment / Plan:  Diagnoses and all orders for this visit:    Acute suppurative otitis media of both ears without spontaneous rupture of tympanic membranes, recurrence not specified  -     azithromycin (ZITHROMAX) 250 mg tablet; Take 1 tablet (250 mg total) by mouth daily for 5 days 2 pills today, then one daily for 4 more days    Irregular heart rhythm    Cervicalgia  -     XR spine cervical complete 4 or 5 vw non injury; Future          Reason For Visit / Chief Complaint  Chief Complaint   Patient presents with    Follow-up     was seen by cardio last week, was put on BP medication and pt states he feels "great"    Ear Drainage     both ears, left ear is sore        HPI:  Kaitlynn Butcher is a 42 y o  male presents today for followup  Pt recently had multiple testing in which his holter monitor revealed 57383 ventricular ecoptic beats  838 coupletes, 37 triplets, 73 runs of NSVT  He was started on Norvasc and metoprolol  He is tolerating both medications well  He reports feeling much better  He also states he has been having Ear drainage, for one week  He has used perioxide without relief  No fever or chills  Drainage most of the day  He remains having neck pain with right arm numbness, EMG recommending MRI  Waiting for authorization of insurance        Historical Information   Past Medical History:   Diagnosis Date    Alcohol abuse     Asthma     GERD (gastroesophageal reflux disease)     Hypertension      Past Surgical History:   Procedure Laterality Date    CARPAL TUNNEL RELEASE Bilateral     CYST REMOVAL       Social History   History   Alcohol Use    Yes     Comment: Pt is on medication to decrease use     History   Drug Use No     History   Smoking Status    Current Every Day Smoker    Packs/day: 2 00    Types: Cigarettes   Smokeless Tobacco    Never Used     Family History   Problem Relation Age of Onset    Stroke Father     Heart attack Father        Meds/Allergies   Allergies   Allergen Reactions    Because [Nonoxynol 9]        Meds:    Current Outpatient Prescriptions:     amLODIPine (NORVASC) 10 mg tablet, Take 1 tablet (10 mg total) by mouth daily, Disp: 30 tablet, Rfl: 1    metoprolol succinate (TOPROL-XL) 50 mg 24 hr tablet, Take 50 mg by mouth daily, Disp: , Rfl: 0    azithromycin (ZITHROMAX) 250 mg tablet, Take 1 tablet (250 mg total) by mouth daily for 5 days 2 pills today, then one daily for 4 more days, Disp: 6 tablet, Rfl: 0      REVIEW OF SYSTEMS  A comprehensive review of systems was negative except for: Ears, nose, mouth, throat, and face: positive for ear drainage      Current Vitals:   Blood Pressure: 130/83 (05/15/18 0744)  Pulse: 63 (05/15/18 0744)  Temperature: (!) 97 °F (36 1 °C) (05/15/18 0744)  Temp Source: Tympanic (05/15/18 0744)  Height: 5' 9" (175 3 cm) (05/15/18 0744)  Weight - Scale: 94 3 kg (208 lb) (05/15/18 0744)  SpO2: 93 % (05/15/18 0744)  [unfilled]    PHYSICAL EXAMS:  General appearance: alert and oriented, in no acute distress  Ears: abnormal TM right ear - retracted and abnormal TM left ear - could not see  Lungs: clear to auscultation bilaterally  Heart: regular rate and rhythm, S1, S2 normal, no murmur, click, rub or gallop  Neurologic: Grossly normal{YES/NO:20        Follow up at this office in after MRI     Counseling / Coordination of Care  Total floor / unit time spent today 20 minutes  Greater than 50% of total time was spent with the patient and / or family counseling and / or coordination of care

## 2018-06-01 ENCOUNTER — OFFICE VISIT (OUTPATIENT)
Dept: URGENT CARE | Facility: CLINIC | Age: 38
End: 2018-06-01
Payer: OTHER MISCELLANEOUS

## 2018-06-01 ENCOUNTER — APPOINTMENT (OUTPATIENT)
Dept: RADIOLOGY | Facility: CLINIC | Age: 38
End: 2018-06-01
Payer: OTHER MISCELLANEOUS

## 2018-06-01 DIAGNOSIS — R07.81 RIB PAIN ON RIGHT SIDE: ICD-10-CM

## 2018-06-01 DIAGNOSIS — R07.81 RIB PAIN ON RIGHT SIDE: Primary | ICD-10-CM

## 2018-06-01 PROCEDURE — 99283 EMERGENCY DEPT VISIT LOW MDM: CPT

## 2018-06-01 PROCEDURE — 71101 X-RAY EXAM UNILAT RIBS/CHEST: CPT

## 2018-06-01 PROCEDURE — G0382 LEV 3 HOSP TYPE B ED VISIT: HCPCS

## 2018-06-01 RX ORDER — IBUPROFEN 800 MG/1
800 TABLET ORAL ONCE
Status: COMPLETED | OUTPATIENT
Start: 2018-06-01 | End: 2018-06-01

## 2018-06-01 RX ORDER — KETOROLAC TROMETHAMINE 30 MG/ML
30 INJECTION, SOLUTION INTRAMUSCULAR; INTRAVENOUS ONCE
Status: DISCONTINUED | OUTPATIENT
Start: 2018-06-01 | End: 2018-06-01

## 2018-06-01 RX ADMIN — IBUPROFEN 800 MG: 800 TABLET ORAL at 12:45

## 2018-06-05 ENCOUNTER — TELEPHONE (OUTPATIENT)
Dept: NEUROLOGY | Facility: CLINIC | Age: 38
End: 2018-06-05

## 2018-06-11 ENCOUNTER — OFFICE VISIT (OUTPATIENT)
Dept: NEUROLOGY | Facility: CLINIC | Age: 38
End: 2018-06-11
Payer: COMMERCIAL

## 2018-06-11 VITALS
HEART RATE: 92 BPM | BODY MASS INDEX: 29.49 KG/M2 | SYSTOLIC BLOOD PRESSURE: 141 MMHG | WEIGHT: 206 LBS | HEIGHT: 70 IN | DIASTOLIC BLOOD PRESSURE: 73 MMHG

## 2018-06-11 DIAGNOSIS — F10.230 ALCOHOL WITHDRAWAL SEIZURE WITHOUT COMPLICATION (HCC): ICD-10-CM

## 2018-06-11 DIAGNOSIS — R56.9 ALCOHOL WITHDRAWAL SEIZURE WITHOUT COMPLICATION (HCC): ICD-10-CM

## 2018-06-11 PROCEDURE — 99204 OFFICE O/P NEW MOD 45 MIN: CPT | Performed by: PSYCHIATRY & NEUROLOGY

## 2018-06-11 NOTE — PATIENT INSTRUCTIONS
--- Please get an MRI of your brain and an EEG before your visit  -- Please call our office if any problems arise

## 2018-06-11 NOTE — ASSESSMENT & PLAN NOTE
Based on the description of his event, this is most consistent with an epileptic seizure  He did have a prior head trauma, but does not have any other significant risk factors for seizures  His prior EEG was normal and noncontrast head CT was also normal, with along with his prior history of significant alcohol abuse, his seizure was likely alcohol withdrawal related  That being said, I would like to further evaluate for any potential risk of having additional seizure  --I will have him get an MRI of his brain seizure protocol with and without contrast to look for any potential source of seizure  Additionally, I will have him get a sleep-deprived EEG  If these would be normal, he likely would not need to medication  However if he would have an abnormality on his MRI or EEG that would potentially be a source of seizures, he would likely benefit from being started on a medication    --I discussed seizure safety, seizure first aid, and driving restrictions  Unfortunately will need to send a letter to [facility]  Although his seizure was likely provoked from alcohol withdrawal, this may not apply for provocative cause, and Ashvin pereira will need to decide if he is able to continue to drive  --I discussed that the best for him to stop drinking alcohol, but that this must be done under guidance of a detox program to avoid alcohol withdrawal   Her alcohol withdrawal can be deadly, and can lead to further seizures, so this should be done safely in a guided fashion

## 2018-06-11 NOTE — PROGRESS NOTES
Patient ID: Sunitha Faulkner is a 34 y o  male with with history of alcohol abuse with alcohol withdrawal, who is presenting to Neurology office for follow up of his a recent hospitalization for an alcohol withdrawal seizure  Assessment/Plan:    Alcohol withdrawal seizure without complication ([facility] [address] )  Based on the description of his event, this is most consistent with an epileptic seizure  He did have a prior head trauma, but does not have any other significant risk factors for seizures  His prior EEG was normal and noncontrast head CT was also normal, with along with his prior history of significant alcohol abuse, his seizure was likely alcohol withdrawal related  That being said, I would like to further evaluate for any potential risk of having additional seizure  --I will have him get an MRI of his brain seizure protocol with and without contrast to look for any potential source of seizure  Additionally, I will have him get a sleep-deprived EEG  If these would be normal, he likely would not need to medication  However if he would have an abnormality on his MRI or EEG that would potentially be a source of seizures, he would likely benefit from being started on a medication    --I discussed seizure safety, seizure first aid, and driving restrictions  Unfortunately will need to send a letter to [facility]  Although his seizure was likely provoked from alcohol withdrawal, this may not apply for provocative cause, and Ashvin pereira will need to decide if he is able to continue to drive  --I discussed that the best for him to stop drinking alcohol, but that this must be done under guidance of a detox program to avoid alcohol withdrawal   Her alcohol withdrawal can be deadly, and can lead to further seizures, so this should be done safely in a guided fashion      I spent a total of 45 min with the patient with greater than 50% of that time spent counseling and coordinating his care, specifically discussing his diagnosis, plan further evaluation, driving restriction, safety, and first aid, as detailed above         He will return to the office in about 3 months  Subjective:    HPI    Current medications as per Epic    Briefly reviewing his history, he had his first seizure on 2/26/2018  He was at home with his family and in the kitchen, when he suddenly lost consciousness  He does not recall what occurred, but was told that he simply fell to the ground, became stiff all over and shaking  It is unclear how long this lasted, but he was very confused after the shaking stopped  Per reports, he had 2 seizures back to back  He was taken to the emergency department the where he regained memory  His seizure was felt to be related to an alcohol withdrawal seizure, and no medications were started  He has a history of longstanding alcohol use, where he will drink up to a 10 shots of liquor a day in the past   Leading up to his seizure, he had stopped drinking alcohol altogether for several days before his seizure  He does note that he gets shaky if he does not drink for significant period of time  He was evaluated with a noncontrast head CT which was unremarkable  He also had a routine EEG which was also normal  Since leaving the emergency department, he has not had any additional events  He does feel that his memory is worse since his seizure and he has been having more trouble with anxiety and depression  He denies any prior seizures  He did have a traumatic head injury when he fell off of a roof about 6-7 years ago  He did not lose consciousness with the initial head trauma, but did pass out after he walked into the house and went to the bathroom  Since his seizure, he has continued to drink alcohol, but has cut back some to where he is now drinking about a six-pack each day    Special Features  Status epilepticus: no  Self Injury Seizures: no  Precipitating Factors: stopping alcohol      Epilepsy Risk Factors:  Uncomplicated pregnancy with normal development  No learning disabilities or cognitive delay  No h/o febrile seizures, CNS infections, strokes, or CNS neoplasms  There is no family history of seizures or epilepsy  Prior AEDs:  none    Prior Evaluation:  - MRI brain: none  - Routine EE2018:  Normal  - Video EEG: none     I reviewed prior notes from his recent emergency department visit, as documented in [employer], and summarized above  The following portions of the patient's history were reviewed and updated as appropriate: allergies, current medications, past family history, past medical history, past social history, past surgical history and problem list          Objective:    Blood pressure 141/73, pulse 92, height 5' 10" (1 778 m), weight 93 4 kg (206 lb)  Physical Exam    Neurological Exam  GENERAL EXAMINATION:   In general patient is well appearing and in no distress  There is no peripheral edema  NEUROLOGIC EXAMINATION:     Alert and oriented to person, date, location  Fund of knowledge is full with good understanding of medical situation  Recent and remote memory were intact    Mood and affect are appropriate  Attention is intact  Language function including fluency, naming, and comprehension intact  Cranial nerves: Pupils are equal round reactive to light and accommodation  Visual Fields are full to confrontation bilaterally  Optic discs are sharp with no evidence of papilledema Extraocular movements are intact without nystagmus  Facial sensation is intact to light touch  No facial droop, face activates symmetrically  There is no dysarthria  Hearing was intact to finger rub  Tongue and uvula are midline and palate elevates symmetrically  Shoulder shrug  5/5  Motor Exam:  No pronator drift  Bulk and tone are normal  Strength is 5/5 throughout  Deep tendon reflexes: Biceps 2+, brachioradialis 2+, patellar 2+, Achilles 2+ bilaterally   Negative Thorntons Sensation: Intact light touch    Coordination: Finger nose finger and heel to shin testing are without dysmetria  Gait: Negative romberg  Normal casual gait  ROS:    Review of Systems   Constitutional: Negative  HENT: Negative  Eyes: Negative  Respiratory: Negative  Cardiovascular: Positive for leg swelling  Only on the left side and also turns blue-black in color   Gastrointestinal: Positive for diarrhea  Endocrine: Positive for cold intolerance and heat intolerance  Genitourinary: Negative  Musculoskeletal: Positive for back pain and myalgias  Negative for joint swelling  Joint pain     Skin: Negative  Allergic/Immunologic: Negative  Neurological: Positive for dizziness, light-headedness and numbness (arms and tips of finger)  Hematological: Negative  Psychiatric/Behavioral: Positive for confusion (after his seizure episode)  The patient is nervous/anxious

## 2018-06-12 VITALS
TEMPERATURE: 97.9 F | RESPIRATION RATE: 20 BRPM | OXYGEN SATURATION: 98 % | HEART RATE: 76 BPM | SYSTOLIC BLOOD PRESSURE: 132 MMHG | DIASTOLIC BLOOD PRESSURE: 76 MMHG

## 2018-06-12 NOTE — PROGRESS NOTES
[facility]        NAME: Ban Oliveira is a 34 y o  male  : 1980    MRN: [id number]  DATE: 2018  TIME: 8:57 AM    Assessment and Plan   Rib pain on right side [R07 81]  1  Rib pain on right side  XR ribs right w pa chest min 3 views    ibuprofen (MOTRIN) tablet 800 mg    DISCONTINUED: ketorolac (TORADOL) injection 30 mg         Patient Instructions       Follow up with PCP in 3-5 days  Proceed to  ER if symptoms worsen  Chief Complaint     Chief Complaint   Patient presents with    Chest Pain         History of Present Illness       Chest Pain    This is a new problem  The current episode started yesterday  The onset quality is sudden  The problem occurs constantly  The problem has been unchanged  Pain location: right sided rib pain  The pain is at a severity of 2/10  The pain is mild  Quality: aching  The pain does not radiate  Pertinent negatives include no abdominal pain, back pain, claudication, cough, diaphoresis, dizziness, exertional chest pressure, fever, headaches, hemoptysis, irregular heartbeat, leg pain, lower extremity edema, malaise/fatigue, nausea, near-syncope, numbness, orthopnea, palpitations, PND, shortness of breath, sputum production, syncope, vomiting or weakness  The pain is aggravated by nothing  He has tried nothing for the symptoms  The treatment provided no relief  There are no known risk factors  Review of Systems   Review of Systems   Constitutional: Negative for diaphoresis, fever and malaise/fatigue  Respiratory: Negative for cough, hemoptysis, sputum production and shortness of breath  Cardiovascular: Positive for chest pain  Negative for palpitations, orthopnea, claudication, syncope, PND and near-syncope  Right sided rib pain    Gastrointestinal: Negative for abdominal pain, nausea and vomiting  Musculoskeletal: Negative for back pain  Neurological: Negative for dizziness, weakness, numbness and headaches           Current Medications       Current Outpatient Prescriptions:     amLODIPine (NORVASC) 10 mg tablet, Take 1 tablet (10 mg total) by mouth daily, Disp: 30 tablet, Rfl: 1    metoprolol succinate (TOPROL-XL) 50 mg 24 hr tablet, Take 50 mg by mouth daily, Disp: , Rfl: 0    Current Allergies     Allergies as of 06/01/2018 - Reviewed 05/15/2018   Allergen Reaction Noted    Because [nonoxynol 9]  02/26/2018            The following portions of the patient's history were reviewed and updated as appropriate: allergies, current medications, past family history, past medical history, past social history, past surgical history and problem list      Past Medical History:   Diagnosis Date    Alcohol abuse     Asthma     GERD (gastroesophageal reflux disease)     Hypertension        Past Surgical History:   Procedure Laterality Date    CARPAL TUNNEL RELEASE Bilateral     CYST REMOVAL         Family History   Problem Relation Age of Onset    Stroke Father     Heart attack Father     Seizures Neg Hx          Medications have been verified  Objective   /76 (BP Location: Left arm, Patient Position: Sitting)   Pulse 76   Temp 97 9 °F (36 6 °C) (Tympanic)   Resp 20   SpO2 98%        Physical Exam     Physical Exam   Constitutional: He is oriented to person, place, and time  Vital signs are normal  He appears well-developed  HENT:   Head: Normocephalic and atraumatic  Right Ear: External ear normal    Left Ear: External ear normal    Nose: Nose normal    Mouth/Throat: Oropharynx is clear and moist    Eyes: Conjunctivae and EOM are normal  Pupils are equal, round, and reactive to light  Lids are everted and swept, no foreign bodies found  Neck: Normal range of motion  Neck supple  Cardiovascular: Normal rate, regular rhythm, normal heart sounds and intact distal pulses  Pulmonary/Chest: Effort normal and breath sounds normal    Abdominal: Normal appearance  Musculoskeletal: Normal range of motion     Right sided rib pain in 6-10th rib after getting hit with  wheel  earlier today    Neurological: He is alert and oriented to person, place, and time  He has normal reflexes  Skin: Skin is warm, dry and intact  Psychiatric: He has a normal mood and affect   His speech is normal and behavior is normal  Judgment and thought content normal

## 2018-06-12 NOTE — PATIENT INSTRUCTIONS
Xray appears negative for any fracture  Will follow up with radiologist report when available  Icing the area every 2 hours for 20-30 minutes, take Ibuprofen every 6-8 hours to reduce inflammation  If not improving over the next week, follow up with PCP or orthopedics

## 2018-07-12 ENCOUNTER — TRANSCRIBE ORDERS (OUTPATIENT)
Dept: ADMINISTRATIVE | Facility: HOSPITAL | Age: 38
End: 2018-07-12

## 2018-07-16 ENCOUNTER — APPOINTMENT (OUTPATIENT)
Dept: LAB | Facility: HOSPITAL | Age: 38
End: 2018-07-16
Attending: PSYCHIATRY & NEUROLOGY
Payer: COMMERCIAL

## 2018-07-16 ENCOUNTER — TELEPHONE (OUTPATIENT)
Dept: NEUROLOGY | Facility: CLINIC | Age: 38
End: 2018-07-16

## 2018-07-16 DIAGNOSIS — R56.9 ALCOHOL WITHDRAWAL SEIZURE WITHOUT COMPLICATION (HCC): ICD-10-CM

## 2018-07-16 DIAGNOSIS — F10.230 ALCOHOL WITHDRAWAL SEIZURE WITHOUT COMPLICATION (HCC): Primary | ICD-10-CM

## 2018-07-16 DIAGNOSIS — R56.9 ALCOHOL WITHDRAWAL SEIZURE WITHOUT COMPLICATION (HCC): Primary | ICD-10-CM

## 2018-07-16 DIAGNOSIS — F10.230 ALCOHOL WITHDRAWAL SEIZURE WITHOUT COMPLICATION (HCC): ICD-10-CM

## 2018-07-16 LAB
BUN SERPL-MCNC: 6 MG/DL (ref 5–25)
CREAT SERPL-MCNC: 1.03 MG/DL (ref 0.6–1.3)
GFR SERPL CREATININE-BSD FRML MDRD: 90 ML/MIN/1.73SQ M

## 2018-07-16 PROCEDURE — 36415 COLL VENOUS BLD VENIPUNCTURE: CPT

## 2018-07-16 PROCEDURE — 82565 ASSAY OF CREATININE: CPT

## 2018-07-16 PROCEDURE — 84520 ASSAY OF UREA NITROGEN: CPT

## 2018-07-16 NOTE — TELEPHONE ENCOUNTER
Received call from radiology stating pt requires labs prior to the MRI tomorrow  I did not see any recent labs done, BUN and CREAT ordered  LMOM to return call

## 2018-07-17 ENCOUNTER — HOSPITAL ENCOUNTER (OUTPATIENT)
Dept: MRI IMAGING | Facility: HOSPITAL | Age: 38
Discharge: HOME | End: 2018-07-17
Payer: COMMERCIAL

## 2018-07-17 DIAGNOSIS — F10.230 ALCOHOL WITHDRAWAL SEIZURE WITHOUT COMPLICATION (HCC): ICD-10-CM

## 2018-07-17 DIAGNOSIS — R56.9 ALCOHOL WITHDRAWAL SEIZURE WITHOUT COMPLICATION (HCC): ICD-10-CM

## 2018-07-17 PROCEDURE — 70553 MRI BRAIN STEM W/O & W/DYE: CPT

## 2018-07-17 PROCEDURE — A9585 GADOBUTROL INJECTION: HCPCS | Performed by: PSYCHIATRY & NEUROLOGY

## 2018-07-17 RX ADMIN — GADOBUTROL 9 ML: 604.72 INJECTION INTRAVENOUS at 18:46

## 2018-07-25 ENCOUNTER — OFFICE VISIT (OUTPATIENT)
Dept: FAMILY MEDICINE CLINIC | Facility: CLINIC | Age: 38
End: 2018-07-25
Payer: COMMERCIAL

## 2018-07-25 VITALS
BODY MASS INDEX: 28.92 KG/M2 | SYSTOLIC BLOOD PRESSURE: 140 MMHG | TEMPERATURE: 97.9 F | HEIGHT: 70 IN | HEART RATE: 74 BPM | RESPIRATION RATE: 17 BRPM | WEIGHT: 202 LBS | OXYGEN SATURATION: 95 % | DIASTOLIC BLOOD PRESSURE: 84 MMHG

## 2018-07-25 DIAGNOSIS — F10.230 ALCOHOL WITHDRAWAL SEIZURE WITHOUT COMPLICATION (HCC): Primary | ICD-10-CM

## 2018-07-25 DIAGNOSIS — R56.9 ALCOHOL WITHDRAWAL SEIZURE WITHOUT COMPLICATION (HCC): Primary | ICD-10-CM

## 2018-07-25 PROCEDURE — T1015 CLINIC SERVICE: HCPCS | Performed by: FAMILY MEDICINE

## 2018-07-25 PROCEDURE — 80307 DRUG TEST PRSMV CHEM ANLYZR: CPT | Performed by: NURSE PRACTITIONER

## 2018-07-25 PROCEDURE — 80324 DRUG SCREEN AMPHETAMINES 1/2: CPT | Performed by: NURSE PRACTITIONER

## 2018-07-25 NOTE — LETTER
July 25, 2018     Patient: Kamryn Daley   YOB: 1980   Date of Visit: 7/25/2018       To Whom it May Concern:    Nathan Angela is under my professional care  He was seen in my office on 7/25/2018  He may return to work on 07/25/2018  If you have any questions or concerns, please don't hesitate to call           Sincerely,          STACEY Delacruz        CC: No Recipients

## 2018-07-25 NOTE — PROGRESS NOTES
OFFICE VISIT  Cade Tyler 34 y o  male MRN: [id number]      Assessment / Plan:  Diagnoses and all orders for this visit:    Alcohol withdrawal seizure without complication [facility])  -     Toxicology screen, urine  -     Ethanol, urine          Reason For Visit / Chief Complaint  Chief Complaint   Patient presents with    Follow-up        HPI:  Cade Tyler is a 34 y o  male presents today for follow up  Pt was seen by neurology, one time seizure, per office visit note, seziure from alcohol withdrawal seizure  He has not had any alcohol for 5 months   [employer]  Historical Information   Past Medical History:   Diagnosis Date    Alcohol abuse     Asthma     GERD (gastroesophageal reflux disease)     Hypertension      Past Surgical History:   Procedure Laterality Date    CARPAL TUNNEL RELEASE Bilateral     CYST REMOVAL       Social History   History   Alcohol Use    Yes     Comment: trying to decrease   Currently drinking about 6 pack a day     History   Drug Use No     History   Smoking Status    Current Every Day Smoker    Packs/day: 1 50    Types: Cigarettes   Smokeless Tobacco    Never Used     Family History   Problem Relation Age of Onset    Stroke Father     Heart attack Father     Seizures Neg Hx        Meds/Allergies   Allergies   Allergen Reactions    Because [Nonoxynol 9]        Meds:    Current Outpatient Prescriptions:     amLODIPine (NORVASC) 10 mg tablet, Take 1 tablet (10 mg total) by mouth daily, Disp: 30 tablet, Rfl: 1    metoprolol succinate (TOPROL-XL) 50 mg 24 hr tablet, Take 50 mg by mouth daily, Disp: , Rfl: 0      REVIEW OF SYSTEMS  A comprehensive review of systems was negative except for: Ears, nose, mouth, throat, and face: positive for ear pain       Current Vitals:   Blood Pressure: 140/84 (07/25/18 0747)  Pulse: 74 (07/25/18 0747)  Temperature: 97 9 °F (36 6 °C) (07/25/18 0747)  Respirations: 17 (07/25/18 0747)  Height: 5' 10" (177 8 cm) (07/25/18 0747)  Weight - Scale: 91 6 kg (202 lb) (07/25/18 0747)  SpO2: 95 % (07/25/18 0747)  [unfilled]    PHYSICAL EXAMS:  General appearance: alert and oriented, in no acute distress  Lungs: clear to auscultation bilaterally  Heart: regular rate and rhythm, S1, S2 normal, no murmur, click, rub or gallop  Pulses: 2+ and symmetric  Skin: Skin color, texture, turgor normal  No rashes or lesions  Lymph nodes: Cervical, supraclavicular, and axillary nodes normal   Neurologic: Grossly normal{YES/NO:20        Follow up at this office in 3 months     Counseling / Coordination of Care  Total floor / unit time spent today 20 minutes  Greater than 50% of total time was spent with the patient and / or family counseling and / or coordination of care

## 2018-07-26 LAB
AMPHETAMINES UR QL SCN: NEGATIVE NG/ML
BARBITURATES UR QL SCN: NEGATIVE NG/ML
BENZODIAZ UR QL SCN: NEGATIVE NG/ML
BZE UR QL: NEGATIVE NG/ML
CANNABINOIDS UR QL SCN: NEGATIVE NG/ML
METHADONE UR QL SCN: NEGATIVE NG/ML
OPIATES UR QL: NEGATIVE NG/ML
PCP UR QL: NEGATIVE NG/ML
PROPOXYPH UR QL: NEGATIVE NG/ML

## 2018-07-31 DIAGNOSIS — I10 ESSENTIAL HYPERTENSION: ICD-10-CM

## 2018-07-31 LAB
ETHANOL BLD GC-MCNC: 0.06 %
ETHANOL UR-MCNC: NORMAL %

## 2018-08-01 RX ORDER — AMLODIPINE BESYLATE 10 MG/1
TABLET ORAL
Qty: 30 TABLET | Refills: 1 | Status: SHIPPED | OUTPATIENT
Start: 2018-08-01 | End: 2018-12-06 | Stop reason: SDUPTHER

## 2018-08-02 PROBLEM — K21.9 GERD WITHOUT ESOPHAGITIS: Status: ACTIVE | Noted: 2018-08-02

## 2018-08-02 PROBLEM — I10 ESSENTIAL HYPERTENSION: Status: ACTIVE | Noted: 2018-08-02

## 2018-08-07 ENCOUNTER — OFFICE VISIT (OUTPATIENT)
Dept: CARDIOLOGY CLINIC | Facility: CLINIC | Age: 38
End: 2018-08-07
Payer: COMMERCIAL

## 2018-08-07 VITALS
WEIGHT: 202 LBS | SYSTOLIC BLOOD PRESSURE: 156 MMHG | DIASTOLIC BLOOD PRESSURE: 92 MMHG | HEART RATE: 80 BPM | BODY MASS INDEX: 28.92 KG/M2 | HEIGHT: 70 IN

## 2018-08-07 DIAGNOSIS — R56.9 ALCOHOL WITHDRAWAL SEIZURE WITHOUT COMPLICATION (HCC): ICD-10-CM

## 2018-08-07 DIAGNOSIS — F10.230 ALCOHOL WITHDRAWAL SEIZURE WITHOUT COMPLICATION (HCC): ICD-10-CM

## 2018-08-07 DIAGNOSIS — I10 ESSENTIAL HYPERTENSION: Primary | ICD-10-CM

## 2018-08-07 DIAGNOSIS — I49.3 PVC (PREMATURE VENTRICULAR CONTRACTION): ICD-10-CM

## 2018-08-07 PROCEDURE — 99213 OFFICE O/P EST LOW 20 MIN: CPT | Performed by: INTERNAL MEDICINE

## 2018-08-07 NOTE — PROGRESS NOTES
HPI:  Follow-up visit for this 70-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man for essential hypertension and chronic PVCs along with a history of alcohol abuse with a alcohol withdrawal seizure  He has been seen by Neurology and workup negative for any ongoing neurological issues  Patient denies chest pain shortness of breath dizziness or heart palpitations orthopnea nocturnal dyspnea or ankle swelling  He underwent a recent exercise stress echo which was nonischemic  He did demonstrate some PVCs  He is taking beta-blockers  He did have a hypertensive response to exercise  Current Outpatient Prescriptions:     amLODIPine (NORVASC) 10 mg tablet, take 1 tablet by mouth once daily, Disp: 30 tablet, Rfl: 1    metoprolol succinate (TOPROL-XL) 50 mg 24 hr tablet, Take 50 mg by mouth daily, Disp: , Rfl: 0      PHYSICAL EXAM:  Blood pressure 140/80 pulse rate 88 and regular respiratory rate 16 per minute  No neck vein distention no carotid bruits carotid upstroke and volume are normal head eyes ears nose and throat normal lungs are clear without wheezing rales or rhonchi breath sounds normal bilaterally no chest wall deformities  Cardiac exam PMI not displaced no heaves lifts or thrills no murmurs rubs or gallop sounds PMI not displaced  Abdomen soft nontender no masses or bruits extremities without edema cyanosis or clubbing no calf pain swelling or tenderness Homans sign is negative  IMPRESSION AND PLAN:    Chronic PVCs without evidence for structural heart disease based on normal exercise stress echo  History of seizure related to alcohol withdrawal    Nonischemic stress echo    Essential hypertension now better controlled    Continue beta-blocker therapy    Follow-up visit in 6 months

## 2018-11-13 ENCOUNTER — OFFICE VISIT (OUTPATIENT)
Dept: FAMILY MEDICINE CLINIC | Facility: CLINIC | Age: 38
End: 2018-11-13
Payer: COMMERCIAL

## 2018-11-13 VITALS
RESPIRATION RATE: 18 BRPM | WEIGHT: 206 LBS | TEMPERATURE: 97.4 F | HEART RATE: 74 BPM | BODY MASS INDEX: 29.49 KG/M2 | SYSTOLIC BLOOD PRESSURE: 120 MMHG | DIASTOLIC BLOOD PRESSURE: 80 MMHG | HEIGHT: 70 IN | OXYGEN SATURATION: 98 %

## 2018-11-13 DIAGNOSIS — J06.9 UPPER RESPIRATORY TRACT INFECTION, UNSPECIFIED TYPE: Primary | ICD-10-CM

## 2018-11-13 PROCEDURE — T1015 CLINIC SERVICE: HCPCS | Performed by: FAMILY MEDICINE

## 2018-11-13 RX ORDER — AMOXICILLIN AND CLAVULANATE POTASSIUM 875; 125 MG/1; MG/1
1 TABLET, FILM COATED ORAL 2 TIMES DAILY
Qty: 14 TABLET | Refills: 0 | Status: SHIPPED | OUTPATIENT
Start: 2018-11-13 | End: 2018-11-20

## 2018-11-13 NOTE — LETTER
November 13, 2018     Patient: Dimas Nava   YOB: 1980   Date of Visit: 11/13/2018       To Whom it May Concern:    Kim Go is under my professional care  He was seen in my office on 11/13/2018  He may return to work on 11/14/2018  If you have any questions or concerns, please don't hesitate to call           Sincerely,          STACEY Mckeon        CC: No Recipients

## 2018-11-13 NOTE — PROGRESS NOTES
OFFICE VISIT  Lon Griggs 34 y o  male MRN: [id number]      Assessment / Plan:  Diagnoses and all orders for this visit:    Upper respiratory tract infection, unspecified type  -     amoxicillin-clavulanate (AUGMENTIN) 875-125 mg per tablet; Take 1 tablet by mouth 2 (two) times a day for 7 days      You have been prescribed an antibiotic  This medication is used to treat bacterial infections  Follow the directions as prescribed  Do not share this medication with anyone  Do not stop taking her medication until it is finished, even if you are feeling better  Taking medication with a full glass of water  Call the office if you experience any possible side effects  I recommend that the patient takes an over the counter probiotic or eats yogurt with live cultures in it [place]) to keep good bacteria in the gut and help prevent diarrhea  Wash hands frequently to prevent the spread of infection  Ibuprofen and/or tylenol as needed for pain or fever  If not improving over the next 7-10 days, call office  Reason For Visit / Chief Complaint  Chief Complaint   Patient presents with    Cold Like Symptoms     Cough, phlegm, ear pain, throat pain        HPI:  Lon Griggs is a 34 y o  male who present with multiple complaints  He has reports cough, with brown to yellow  He reports ear ache, head pressure  Taking otc cold medication without relief  Historical Information   Past Medical History:   Diagnosis Date    Alcohol abuse     Asthma     GERD (gastroesophageal reflux disease)     w/o esophagitis    Hx of echocardiogram 06/18/2018    negative for ischemia    Hypertension      Past Surgical History:   Procedure Laterality Date    CARPAL TUNNEL RELEASE Bilateral     CYST REMOVAL       Social History   History   Alcohol Use    Yes     Comment: trying to decrease   Currently drinking about 6 pack a day     History   Drug Use No     History   Smoking Status    Current Every Day Smoker    Packs/day: 1 50    Types: Cigarettes   Smokeless Tobacco    Never Used     Family History   Problem Relation Age of Onset    Stroke Father     Heart attack Father     Coronary artery disease Family         Less than 60 yrs of age   Elizabeth Clunes Seizures Neg Hx        Meds/Allergies   Allergies   Allergen Reactions    Because [Nonoxynol 9]        Meds:    Current Outpatient Prescriptions:     amLODIPine (NORVASC) 10 mg tablet, take 1 tablet by mouth once daily, Disp: 30 tablet, Rfl: 1    amoxicillin-clavulanate (AUGMENTIN) 875-125 mg per tablet, Take 1 tablet by mouth 2 (two) times a day for 7 days, Disp: 14 tablet, Rfl: 0    metoprolol succinate (TOPROL-XL) 50 mg 24 hr tablet, Take 50 mg by mouth daily, Disp: , Rfl: 0      REVIEW OF SYSTEMS  Review of Systems   Constitutional: Negative for activity change, chills, fatigue and fever  HENT: Positive for congestion, ear pain, postnasal drip, rhinorrhea, sinus pain and sinus pressure  Negative for ear discharge, sore throat, tinnitus and trouble swallowing  Eyes: Negative for photophobia, pain, discharge, itching and visual disturbance  Respiratory: Positive for cough  Negative for chest tightness, shortness of breath and wheezing  Cardiovascular: Negative for chest pain and leg swelling  Gastrointestinal: Negative for abdominal distention, abdominal pain, constipation, diarrhea, nausea and vomiting  Endocrine: Negative for polydipsia, polyphagia and polyuria  Genitourinary: Negative for dysuria and frequency  Musculoskeletal: Negative for arthralgias, myalgias, neck pain and neck stiffness  Skin: Negative for color change  Neurological: Negative for dizziness, syncope, weakness, numbness and headaches  Hematological: Does not bruise/bleed easily  Psychiatric/Behavioral: Negative for behavioral problems, confusion, self-injury, sleep disturbance and suicidal ideas  The patient is not nervous/anxious              Current Vitals:   Blood Pressure: 120/80 (11/13/18 1125)  Pulse: 74 (11/13/18 1125)  Temperature: (!) 97 4 °F (36 3 °C) (11/13/18 1125)  Respirations: 18 (11/13/18 1125)  Height: 5' 10" (177 8 cm) (11/13/18 1125)  Weight - Scale: 93 4 kg (206 lb) (11/13/18 1125)  SpO2: 98 % (11/13/18 1125)  [unfilled]    PHYSICAL EXAMS:  Physical Exam   Constitutional: He is oriented to person, place, and time  He appears well-developed and well-nourished  HENT:   Head: Normocephalic and atraumatic  Right Ear: External ear normal    Left Ear: External ear normal    Nose: Mucosal edema, rhinorrhea and sinus tenderness present  Right sinus exhibits frontal sinus tenderness  Left sinus exhibits frontal sinus tenderness  Mouth/Throat: Oropharynx is clear and moist    Eyes: Pupils are equal, round, and reactive to light  Conjunctivae are normal  Right eye exhibits no discharge  Left eye exhibits no discharge  Neck: Normal range of motion  Neck supple  No thyromegaly present  Cardiovascular: Normal rate, regular rhythm and normal heart sounds  Pulmonary/Chest: Effort normal and breath sounds normal    Abdominal: Soft  Bowel sounds are normal  He exhibits no distension  There is no tenderness  Musculoskeletal: Normal range of motion  He exhibits no edema, tenderness or deformity  Neurological: He is alert and oriented to person, place, and time  Skin: Skin is warm and dry  No rash noted  No erythema  Psychiatric: He has a normal mood and affect  His behavior is normal            Follow up at this office in if not better     Counseling / Coordination of Care  Total floor / unit time spent today 20 minutes  Greater than 50% of total time was spent with the patient and / or family counseling and / or coordination of care

## 2018-12-06 DIAGNOSIS — I10 ESSENTIAL HYPERTENSION: ICD-10-CM

## 2018-12-07 RX ORDER — AMLODIPINE BESYLATE 10 MG/1
10 TABLET ORAL DAILY
Qty: 30 TABLET | Refills: 3 | Status: SHIPPED | OUTPATIENT
Start: 2018-12-07 | End: 2019-07-15

## 2018-12-27 ENCOUNTER — APPOINTMENT (EMERGENCY)
Dept: RADIOLOGY | Facility: HOSPITAL | Age: 38
End: 2018-12-27
Payer: COMMERCIAL

## 2018-12-27 ENCOUNTER — HOSPITAL ENCOUNTER (EMERGENCY)
Facility: HOSPITAL | Age: 38
Discharge: HOME | End: 2018-12-27
Attending: EMERGENCY MEDICINE | Admitting: EMERGENCY MEDICINE
Payer: COMMERCIAL

## 2018-12-27 ENCOUNTER — APPOINTMENT (EMERGENCY)
Dept: ULTRASOUND IMAGING | Facility: HOSPITAL | Age: 38
End: 2018-12-27
Payer: COMMERCIAL

## 2018-12-27 VITALS
OXYGEN SATURATION: 96 % | BODY MASS INDEX: 30.43 KG/M2 | HEART RATE: 76 BPM | HEIGHT: 70 IN | DIASTOLIC BLOOD PRESSURE: 91 MMHG | WEIGHT: 212.52 LBS | RESPIRATION RATE: 16 BRPM | SYSTOLIC BLOOD PRESSURE: 154 MMHG | TEMPERATURE: 99.1 F

## 2018-12-27 DIAGNOSIS — R10.13 EPIGASTRIC ABDOMINAL PAIN: Primary | ICD-10-CM

## 2018-12-27 LAB
ALBUMIN SERPL BCP-MCNC: 3.5 G/DL (ref 3.5–5)
ALP SERPL-CCNC: 79 U/L (ref 46–116)
ALT SERPL W P-5'-P-CCNC: 41 U/L (ref 12–78)
ANION GAP SERPL CALCULATED.3IONS-SCNC: 10 MMOL/L (ref 4–13)
AST SERPL W P-5'-P-CCNC: 34 U/L (ref 5–45)
BASOPHILS # BLD AUTO: 0.09 THOUSANDS/ΜL (ref 0–0.1)
BASOPHILS NFR BLD AUTO: 1 % (ref 0–1)
BILIRUB SERPL-MCNC: 0.7 MG/DL (ref 0.2–1)
BUN SERPL-MCNC: 8 MG/DL (ref 5–25)
CALCIUM SERPL-MCNC: 8.5 MG/DL (ref 8.3–10.1)
CHLORIDE SERPL-SCNC: 103 MMOL/L (ref 100–108)
CHOLEST SERPL-MCNC: 158 MG/DL (ref 50–200)
CO2 SERPL-SCNC: 26 MMOL/L (ref 21–32)
CREAT SERPL-MCNC: 1.04 MG/DL (ref 0.6–1.3)
EOSINOPHIL # BLD AUTO: 0.37 THOUSAND/ΜL (ref 0–0.61)
EOSINOPHIL NFR BLD AUTO: 4 % (ref 0–6)
ERYTHROCYTE [DISTWIDTH] IN BLOOD BY AUTOMATED COUNT: 12.3 % (ref 11.6–15.1)
ETHANOL SERPL-MCNC: <3 MG/DL (ref 0–3)
GFR SERPL CREATININE-BSD FRML MDRD: 89 ML/MIN/1.73SQ M
GLUCOSE SERPL-MCNC: 126 MG/DL (ref 65–140)
HCT VFR BLD AUTO: 44.7 % (ref 36.5–49.3)
HDLC SERPL-MCNC: 37 MG/DL (ref 40–60)
HGB BLD-MCNC: 15.5 G/DL (ref 12–17)
IMM GRANULOCYTES # BLD AUTO: 0.03 THOUSAND/UL (ref 0–0.2)
IMM GRANULOCYTES NFR BLD AUTO: 0 % (ref 0–2)
INR PPP: 0.9 (ref 0.86–1.17)
LIPASE SERPL-CCNC: 339 U/L (ref 73–393)
LYMPHOCYTES # BLD AUTO: 2.31 THOUSANDS/ΜL (ref 0.6–4.47)
LYMPHOCYTES NFR BLD AUTO: 24 % (ref 14–44)
MAGNESIUM SERPL-MCNC: 2.1 MG/DL (ref 1.6–2.6)
MCH RBC QN AUTO: 32.6 PG (ref 26.8–34.3)
MCHC RBC AUTO-ENTMCNC: 34.7 G/DL (ref 31.4–37.4)
MCV RBC AUTO: 94 FL (ref 82–98)
MONOCYTES # BLD AUTO: 0.6 THOUSAND/ΜL (ref 0.17–1.22)
MONOCYTES NFR BLD AUTO: 6 % (ref 4–12)
NEUTROPHILS # BLD AUTO: 6.34 THOUSANDS/ΜL (ref 1.85–7.62)
NEUTS SEG NFR BLD AUTO: 65 % (ref 43–75)
NONHDLC SERPL-MCNC: 121 MG/DL
NRBC BLD AUTO-RTO: 0 /100 WBCS
PLATELET # BLD AUTO: 239 THOUSANDS/UL (ref 149–390)
PMV BLD AUTO: 9.2 FL (ref 8.9–12.7)
POTASSIUM SERPL-SCNC: 4 MMOL/L (ref 3.5–5.3)
PROT SERPL-MCNC: 7.6 G/DL (ref 6.4–8.2)
PROTHROMBIN TIME: 11.7 SECONDS (ref 11.8–14.2)
RBC # BLD AUTO: 4.75 MILLION/UL (ref 3.88–5.62)
SODIUM SERPL-SCNC: 139 MMOL/L (ref 136–145)
TRIGL SERPL-MCNC: 549 MG/DL
TROPONIN I SERPL-MCNC: 0.02 NG/ML
WBC # BLD AUTO: 9.74 THOUSAND/UL (ref 4.31–10.16)

## 2018-12-27 PROCEDURE — 80053 COMPREHEN METABOLIC PANEL: CPT | Performed by: EMERGENCY MEDICINE

## 2018-12-27 PROCEDURE — 71046 X-RAY EXAM CHEST 2 VIEWS: CPT

## 2018-12-27 PROCEDURE — 96374 THER/PROPH/DIAG INJ IV PUSH: CPT

## 2018-12-27 PROCEDURE — 85610 PROTHROMBIN TIME: CPT | Performed by: EMERGENCY MEDICINE

## 2018-12-27 PROCEDURE — 36415 COLL VENOUS BLD VENIPUNCTURE: CPT | Performed by: EMERGENCY MEDICINE

## 2018-12-27 PROCEDURE — 93005 ELECTROCARDIOGRAM TRACING: CPT

## 2018-12-27 PROCEDURE — 83690 ASSAY OF LIPASE: CPT | Performed by: EMERGENCY MEDICINE

## 2018-12-27 PROCEDURE — 99285 EMERGENCY DEPT VISIT HI MDM: CPT

## 2018-12-27 PROCEDURE — 83735 ASSAY OF MAGNESIUM: CPT | Performed by: EMERGENCY MEDICINE

## 2018-12-27 PROCEDURE — 80320 DRUG SCREEN QUANTALCOHOLS: CPT | Performed by: EMERGENCY MEDICINE

## 2018-12-27 PROCEDURE — 84484 ASSAY OF TROPONIN QUANT: CPT | Performed by: EMERGENCY MEDICINE

## 2018-12-27 PROCEDURE — 76705 ECHO EXAM OF ABDOMEN: CPT

## 2018-12-27 PROCEDURE — 85025 COMPLETE CBC W/AUTO DIFF WBC: CPT | Performed by: EMERGENCY MEDICINE

## 2018-12-27 PROCEDURE — 80061 LIPID PANEL: CPT | Performed by: EMERGENCY MEDICINE

## 2018-12-27 RX ORDER — PANTOPRAZOLE SODIUM 20 MG/1
20 TABLET, DELAYED RELEASE ORAL DAILY
Qty: 30 TABLET | Refills: 0 | Status: SHIPPED | OUTPATIENT
Start: 2018-12-27 | End: 2019-04-01

## 2018-12-27 RX ADMIN — FAMOTIDINE 20 MG: 10 INJECTION, SOLUTION INTRAVENOUS at 19:45

## 2018-12-27 RX ADMIN — LIDOCAINE HYDROCHLORIDE 15 ML: 20 SOLUTION ORAL; TOPICAL at 19:49

## 2018-12-28 LAB
ATRIAL RATE: 75 BPM
P AXIS: 34 DEGREES
PR INTERVAL: 170 MS
QRS AXIS: 32 DEGREES
QRSD INTERVAL: 104 MS
QT INTERVAL: 400 MS
QTC INTERVAL: 446 MS
T WAVE AXIS: 29 DEGREES
VENTRICULAR RATE: 75 BPM

## 2018-12-28 PROCEDURE — 93010 ELECTROCARDIOGRAM REPORT: CPT | Performed by: INTERNAL MEDICINE

## 2018-12-28 NOTE — DISCHARGE INSTRUCTIONS
Abdominal Pain   WHAT YOU NEED TO KNOW:   Abdominal pain can be dull, achy, or sharp  You may have pain in one area of your abdomen, or in your entire abdomen  Your pain may be caused by a condition such as constipation, food sensitivity or poisoning, infection, or a blockage  Abdominal pain can also be from a hernia, appendicitis, or an ulcer  Liver, gallbladder, or kidney conditions can also cause abdominal pain  The cause of your abdominal pain may be unknown  DISCHARGE INSTRUCTIONS:   Return to the emergency department if:   · You have new chest pain or shortness of breath  · You have pulsing pain in your upper abdomen or lower back that suddenly becomes constant  · Your pain is in the right lower abdominal area and worsens with movement  · You have a fever over 100 4°F (38°C) or shaking chills  · You are vomiting and cannot keep food or liquids down  · Your pain does not improve or gets worse over the next 8 to 12 hours  · You see blood in your vomit or bowel movements, or they look black and tarry  · Your skin or the whites of your eyes turn yellow  · You are a woman and have a large amount of vaginal bleeding that is not your monthly period  Contact your healthcare provider if:   · You have pain in your lower back  · You are a man and have pain in your testicles  · You have pain when you urinate  · You have questions or concerns about your condition or care  Follow up with your healthcare provider within 24 hours or as directed:  Write down your questions so you remember to ask them during your visits  Medicines:   · Medicines  may be given to calm your stomach and prevent vomiting or to decrease pain  Ask how to take pain medicine safely  · Take your medicine as directed  Contact your healthcare provider if you think your medicine is not helping or if you have side effects  Tell him of her if you are allergic to any medicine   Keep a list of the medicines, vitamins, and herbs you take  Include the amounts, and when and why you take them  Bring the list or the pill bottles to follow-up visits  Carry your medicine list with you in case of an emergency  © 2017 [employer] Information is for End User's use only and may not be sold, redistributed or otherwise used for commercial purposes  All illustrations and images included in CareNotes® are the copyrighted property of [employer]  or [employer]  The above information is an  only  It is not intended as medical advice for individual conditions or treatments  Talk to your doctor, nurse or pharmacist before following any medical regimen to see if it is safe and effective for you

## 2018-12-28 NOTE — ED PROVIDER NOTES
History  Chief Complaint   Patient presents with    Chest Pain     Pt reports he was at work and when he got up he felt something pull in his chest  Pt reports pain radiates "down into stomach and back up again  I don't know if it's a gas pocket or what"       History provided by:  Patient, spouse and medical records  Chest Pain   Pain location:  Epigastric  Pain quality: dull    Pain radiates to:  Does not radiate  Pain severity:  Moderate  Onset quality:  Sudden  Duration:  2 days  Timing:  Constant  Progression:  Waxing and waning  Chronicity:  New (No previous hx of similar sx)  Context: eating (Approx 3-4d of eating increasingly rich/heavy foods around Reyn[place] holiday before onset of sx)    Context: not breathing, no drug use, no intercourse, not lifting, no movement, not raising an arm, not at rest, no stress and no trauma    Context comment:  Onset of pain when patient was sitting upright after working on a vehicle  Relieved by:  Nothing  Exacerbated by: Standing upright seems to worsen pain; pain is unchanged by sitting upright and lying supine  Ineffective treatments: Took ranitidine today without improvement in sx  Associated symptoms: abdominal pain and heartburn    Associated symptoms: no cough, no diaphoresis, no dysphagia, no fatigue, no fever, no lower extremity edema, no nausea, no orthopnea, no palpitations, no PND, no shortness of breath, no syncope and not vomiting    Risk factors: hypertension, male sex and smoking (Approx 40 pack-yr hx smoking)    Risk factors: no coronary artery disease (No previous stress testing or catheterization  There is a family hx of CAD in father/maternal grandmother), no diabetes mellitus, no high cholesterol, no immobilization, not obese, no prior DVT/PE and no surgery    Risk factors comment:  No hx GI surgery  Drinks approx 1-6 beers/day down from approx 24 beers/day 1-2 yr prior   Did have EtOH withdrawal seizure earlier in 2018    DDx includes but is not limited to: ACS, gastritis, pancreatitis, enteritis, colitis, duodenitis, GERD  Plan ekg/cxr/labs to include cbc/cmp/lipase/troponin/lipid panel (in case pancreatitis is present); will obtain CXR and RUQ US  Symptomatic therapy with H2 blocker and lidocaine solution while workup pending  Prior to Admission Medications   Prescriptions Last Dose Informant Patient Reported? Taking? amLODIPine (NORVASC) 10 mg tablet 12/26/2018 at Unknown time  No Yes   Sig: Take 1 tablet (10 mg total) by mouth daily   metoprolol succinate (TOPROL-XL) 50 mg 24 hr tablet 12/26/2018 at Unknown time Self Yes Yes   Sig: Take 50 mg by mouth daily      Facility-Administered Medications: None       Past Medical History:   Diagnosis Date    Alcohol abuse     Asthma     GERD (gastroesophageal reflux disease)     w/o esophagitis    Hx of echocardiogram 06/18/2018    negative for ischemia    Hypertension        Past Surgical History:   Procedure Laterality Date    CARPAL TUNNEL RELEASE Bilateral     CYST REMOVAL         Family History   Problem Relation Age of Onset    Stroke Father     Heart attack Father     Coronary artery disease Family         Less than 61 yrs of age    Seizures Neg Hx      I have reviewed and agree with the history as documented  Social History   Substance Use Topics    Smoking status: Current Every Day Smoker     Packs/day: 1 50     Types: Cigarettes    Smokeless tobacco: Never Used    Alcohol use Yes      Comment: trying to decrease  Currently drinking about 6 pack a day        Review of Systems   Constitutional: Negative for chills, diaphoresis, fatigue and fever  HENT: Negative for trouble swallowing  Respiratory: Negative for cough and shortness of breath  Cardiovascular: Negative for chest pain, palpitations, orthopnea, syncope and PND     Gastrointestinal: Positive for abdominal pain, constipation (Typically has 2-3 BM/day but for past 2d has had 1 BM of usual consistency/volume) and heartburn  Negative for diarrhea, nausea and vomiting  Genitourinary: Negative for difficulty urinating, dysuria and flank pain  Skin: Negative for color change, pallor, rash and wound  Hematological: Negative for adenopathy  Does not bruise/bleed easily  All other systems reviewed and are negative  Physical Exam  Physical Exam   Constitutional: He is oriented to person, place, and time  Vital signs are normal  He appears well-developed and well-nourished  He is active and cooperative  No distress  HENT:   Head: Normocephalic and atraumatic  Right Ear: Hearing and external ear normal    Left Ear: Hearing and external ear normal    Nose: Nose normal    Neck: Trachea normal, normal range of motion and phonation normal  Neck supple  No JVD present  No tracheal tenderness, no spinous process tenderness and no muscular tenderness present  No tracheal deviation present  No thyroid mass and no thyromegaly present  Cardiovascular: Normal rate, regular rhythm, S1 normal, S2 normal, normal heart sounds and intact distal pulses  Exam reveals no gallop and no friction rub  No murmur heard  Pulses:       Radial pulses are 2+ on the right side, and 2+ on the left side  Dorsalis pedis pulses are 2+ on the right side, and 2+ on the left side  Posterior tibial pulses are 2+ on the right side, and 2+ on the left side  Pulmonary/Chest: Effort normal and breath sounds normal  No stridor  No respiratory distress  He has no decreased breath sounds  He has no wheezes  He has no rhonchi  He has no rales  He exhibits no tenderness  Abdominal: Soft  He exhibits no distension and no mass  There is tenderness in the right upper quadrant and epigastric area  There is no rigidity, no rebound, no guarding, no CVA tenderness and negative Narvaez's sign  Musculoskeletal: Normal range of motion  He exhibits no edema, tenderness or deformity  Lymphadenopathy:     He has no cervical adenopathy  Neurological: He is alert and oriented to person, place, and time  GCS eye subscore is 4  GCS verbal subscore is 5  GCS motor subscore is 6  Skin: Skin is warm, dry and intact  No rash noted  He is not diaphoretic  No erythema  Nursing note and vitals reviewed        Vital Signs  ED Triage Vitals [12/27/18 1836]   Temperature Pulse Respirations Blood Pressure SpO2   99 1 °F (37 3 °C) 83 17 158/91 95 %      Temp Source Heart Rate Source Patient Position - Orthostatic VS BP Location FiO2 (%)   Temporal Monitor Sitting Right arm --      Pain Score       5           Vitals:    12/27/18 1945 12/27/18 2026 12/27/18 2100 12/27/18 2123   BP: 141/87 135/72 140/70 154/91   Pulse: 81 77 80 76   Patient Position - Orthostatic VS: Lying Lying Sitting Lying       Visual Acuity      ED Medications  Medications   famotidine (PEPCID) injection 20 mg (20 mg Intravenous Given 12/27/18 1945)   lidocaine viscous (XYLOCAINE) 2 % mucosal solution 15 mL (15 mL Swish & Swallow Given 12/27/18 1949)       Diagnostic Studies  Results Reviewed     Procedure Component Value Units Date/Time    Lipase [[id number]]  (Normal) Collected:  12/27/18 1853    Lab Status:  Final result Specimen:  Blood from Arm, Right Updated:  12/27/18 1942     Lipase 339 u/L     Magnesium [[id number]]  (Normal) Collected:  12/27/18 1853    Lab Status:  Final result Specimen:  Blood from Arm, Right Updated:  12/27/18 1942     Magnesium 2 1 mg/dL     Lipid panel [[id number]]  (Abnormal) Collected:  12/27/18 1853    Lab Status:  Final result Specimen:  Blood from Arm, Right Updated:  12/27/18 1942     Cholesterol 158 mg/dL      Triglycerides 549 (H) mg/dL      HDL, Direct 37 (L) mg/dL      LDL Calculated -- mg/dL      Non-HDL-Chol (CHOL-HDL) 121 mg/dl     Protime-INR [[id number]]  (Abnormal) Collected:  12/27/18 1927    Lab Status:  Final result Specimen:  Blood Updated:  12/27/18 1941     Protime 11 7 (L) seconds      INR 0 90    Ethanol [[id number]]  (Normal) Collected: 12/27/18 1927    Lab Status:  Final result Specimen:  Blood Updated:  12/27/18 1940     Ethanol Lvl <3 mg/dL     Comprehensive metabolic panel [[id number]] Collected:  12/27/18 1853    Lab Status:  Final result Specimen:  Blood from Arm, Right Updated:  12/27/18 1916     Sodium 139 mmol/L      Potassium 4 0 mmol/L      Chloride 103 mmol/L      CO2 26 mmol/L      ANION GAP 10 mmol/L      BUN 8 mg/dL      Creatinine 1 04 mg/dL      Glucose 126 mg/dL      Calcium 8 5 mg/dL      AST 34 U/L      ALT 41 U/L      Alkaline Phosphatase 79 U/L      Total Protein 7 6 g/dL      Albumin 3 5 g/dL      Total Bilirubin 0 70 mg/dL      eGFR 89 ml/min/1 73sq m     Narrative:         National Kidney Disease Education Program recommendations are as follows:  GFR calculation is accurate only with a steady state creatinine  Chronic Kidney disease less than 60 ml/min/1 73 sq  meters  Kidney failure less than 15 ml/min/1 73 sq  meters      Troponin I [[id number]]  (Normal) Collected:  12/27/18 1853    Lab Status:  Final result Specimen:  Blood from Arm, Right Updated:  12/27/18 1916     Troponin I 0 02 ng/mL     CBC and differential [[id number]] Collected:  12/27/18 1853    Lab Status:  Final result Specimen:  Blood from Arm, Right Updated:  12/27/18 1857     WBC 9 74 Thousand/uL      RBC 4 75 Million/uL      Hemoglobin 15 5 g/dL      Hematocrit 44 7 %      MCV 94 fL      MCH 32 6 pg      MCHC 34 7 g/dL      RDW 12 3 %      MPV 9 2 fL      Platelets 692 Thousands/uL      nRBC 0 /100 WBCs      Neutrophils Relative 65 %      Immat GRANS % 0 %      Lymphocytes Relative 24 %      Monocytes Relative 6 %      Eosinophils Relative 4 %      Basophils Relative 1 %      Neutrophils Absolute 6 34 Thousands/µL      Immature Grans Absolute 0 03 Thousand/uL      Lymphocytes Absolute 2 31 Thousands/µL      Monocytes Absolute 0 60 Thousand/µL      Eosinophils Absolute 0 37 Thousand/µL      Basophils Absolute 0 09 Thousands/µL                  [facility] quadrant   Final Result by Abby Tubbs DO (12/27 2042)      Contracted gallbladder with resultant wall thickening  No discernible gallstones  Negative Narvaez sign  Mild hepatomegaly  Workstation performed: [id number]         X-ray chest 2 views   ED Interpretation by Timoteo Girard DO (12/27 2024)   Airway is midline  Lungs are clear bilaterally with no evidence of pulmonary vascular congestion/focal infiltrate/pleural effusion//pneumothorax  Cardiac and mediastinal silhouettes are within normal limits  Osseous structures appear normal                  Procedures  ECG 12 Lead Documentation  Date/Time: 12/27/2018 7:09 PM  Performed by: Andie Carvalho  Authorized by: Andie Carvalho     Indications / Diagnosis:  Epigastric pain  ECG reviewed by me, the ED Provider: yes    Patient location:  ED  Previous ECG:     Previous ECG:  Compared to current    Comparison ECG info:  24 April 2018    Similarity:  No change    Comparison to cardiac monitor: Yes    Interpretation:     Interpretation: normal    Rate:     ECG rate:  75    ECG rate assessment: normal    Rhythm:     Rhythm: sinus rhythm    Ectopy:     Ectopy: none    QRS:     QRS axis:  Normal    QRS intervals:  Normal  Conduction:     Conduction: normal    ST segments:     ST segments:  Normal  T waves:     T waves: normal    Comments:      Pr 170 qrs 104 qtc 446           Phone Contacts  ED Phone Contact    ED Course  ED Course as of Dec 27 2124   Thu Dec 27, 2018   1950 1  WBC wnl   2  Hg/Hct wnl   3  Plt wnl   4  Electrolytes wnl   5  Transaminases wnl   6  Lipase wnl   7  EtOH negative  8  INR wnl  9  Troponin nonzero but within reference range  10  Total cholesterol wnl but mildly decreased HDL and elevated triglycerides  2024 RUQ US completed and awaiting interpretation  2051 RUQ US results noted and reviewed: no acute abnormalities present given state of contracted GB  No findings that account for sx      2117 Patient is low-risk for ACS by the HEART score and has approx 1 7-2 5% risk of MACE in the next 30d given the negative EKG and initial cardiac troponin  I discussed with the patient the option of obtaining a repeat cardiac troponin and EKG at the t+3 hr time point  The patient declined stating that he/she was comfortable with the risk of 30d MACE as described  Patient was advised to follow with PMD as soon as possible for further evaluation for additional investigation; ED return precautions regarding chest pain symptoms concerning for ACS were discussed (e g , dyspnea, exertional pain, radiation of pain to shoulders, diaphoresis, vomiting)  There is a comparatively stronger suspicion for GI pathology given nature of patient's sx and preceding change in diet: will treat with PPI for GERD/gastritis while further workup pending  Patient expressed understanding and agreed to plan  ALIE Michaels, STAS Marques, and VINICIO Sanon  2008  Chest pain in the emergency room: value of the HEART score  [facility] heart journal?: monthly journal of the [place] of Cardiology and the [employer], no  6      [place], B E, A J Six, VINICIO Sanon, HEBER Brownlee A Mosterd R RIAN Shankar, et al  2013  A prospective validation of the HEART score for chest pain patients at the emergency department  International journal of cardiology, no  3 (March 7)  doi:10 1016/j ijcard  2013 01 255                 MDM  Number of Diagnoses or Management Options  Epigastric abdominal pain: new and requires workup     Amount and/or Complexity of Data Reviewed  Clinical lab tests: ordered and reviewed  Tests in the radiology section of CPT®: ordered and reviewed  Decide to obtain previous medical records or to obtain history from someone other than the patient: yes  Obtain history from someone other than the patient: yes  Review and summarize past medical records: yes  Independent visualization of images, tracings, or specimens: yes    Risk of Complications, Morbidity, and/or Mortality  Presenting problems: high  Diagnostic procedures: high  Management options: high    Patient Progress  Patient progress: improved    CritCare Time    Disposition  Final diagnoses:   Epigastric abdominal pain     Time reflects when diagnosis was documented in both MDM as applicable and the Disposition within this note     Time User Action Codes Description Comment    12/27/2018  9:15 PM Maximiliano Dyer Add [R10 13] Epigastric abdominal pain       ED Disposition     ED Disposition Condition Comment    Discharge  Anette Sheffield discharge to home/self care  Condition at discharge: Stable        Follow-up Information     Follow up With Specialties Details Why Contact Info Joe Mendes 09, 1615 Wil Abraham Nurse Practitioner Schedule an appointment as soon as possible for a visit in 5 days For further evaluation Randy Rangel 13 [facility] 145       [facility] Emergency Department Emergency Medicine Go to If symptoms worsen: if you develop shortness of breath, if you develop sweating with your stomach pain, if your stomach pain spreads into your chest or neck, if your stomach pain gets worse with physical activity, or if you vomit with your pain  [address] [facility] ED, [address], [address]          Patient's Medications   Discharge Prescriptions    PANTOPRAZOLE (PROTONIX) 20 MG TABLET    Take 1 tablet (20 mg total) by mouth daily       Start Date: 12/27/2018End Date: --       Order Dose: 20 mg       Quantity: 30 tablet    Refills: 0     No discharge procedures on file      ED Provider  Electronically Signed by           Kinza Sykes DO  12/28/18 1667

## 2019-02-08 DIAGNOSIS — I49.3 PVC'S (PREMATURE VENTRICULAR CONTRACTIONS): Primary | ICD-10-CM

## 2019-02-12 RX ORDER — METOPROLOL SUCCINATE 50 MG/1
TABLET, EXTENDED RELEASE ORAL
Qty: 30 TABLET | Refills: 5 | Status: SHIPPED | OUTPATIENT
Start: 2019-02-12 | End: 2019-07-15

## 2019-04-01 ENCOUNTER — APPOINTMENT (EMERGENCY)
Dept: RADIOLOGY | Facility: HOSPITAL | Age: 39
End: 2019-04-01
Payer: COMMERCIAL

## 2019-04-01 ENCOUNTER — HOSPITAL ENCOUNTER (EMERGENCY)
Facility: HOSPITAL | Age: 39
Discharge: HOME | End: 2019-04-01
Attending: INTERNAL MEDICINE
Payer: COMMERCIAL

## 2019-04-01 ENCOUNTER — APPOINTMENT (EMERGENCY)
Dept: CT IMAGING | Facility: HOSPITAL | Age: 39
End: 2019-04-01
Payer: COMMERCIAL

## 2019-04-01 VITALS
RESPIRATION RATE: 17 BRPM | OXYGEN SATURATION: 98 % | TEMPERATURE: 98.1 F | SYSTOLIC BLOOD PRESSURE: 180 MMHG | HEART RATE: 70 BPM | BODY MASS INDEX: 30.49 KG/M2 | WEIGHT: 212.52 LBS | DIASTOLIC BLOOD PRESSURE: 88 MMHG

## 2019-04-01 DIAGNOSIS — R10.9 RIGHT FLANK PAIN: Primary | ICD-10-CM

## 2019-04-01 LAB
ALBUMIN SERPL BCP-MCNC: 3.6 G/DL (ref 3.5–5)
ALP SERPL-CCNC: 69 U/L (ref 46–116)
ALT SERPL W P-5'-P-CCNC: 37 U/L (ref 12–78)
ANION GAP SERPL CALCULATED.3IONS-SCNC: 11 MMOL/L (ref 4–13)
AST SERPL W P-5'-P-CCNC: 25 U/L (ref 5–45)
BACTERIA UR QL AUTO: ABNORMAL /HPF
BASOPHILS # BLD AUTO: 0.1 THOUSANDS/ΜL (ref 0–0.1)
BASOPHILS NFR BLD AUTO: 1 % (ref 0–1)
BILIRUB SERPL-MCNC: 0.8 MG/DL (ref 0.2–1)
BILIRUB UR QL STRIP: NEGATIVE
BUN SERPL-MCNC: 10 MG/DL (ref 5–25)
CALCIUM SERPL-MCNC: 9.3 MG/DL (ref 8.3–10.1)
CHLORIDE SERPL-SCNC: 104 MMOL/L (ref 100–108)
CLARITY UR: CLEAR
CO2 SERPL-SCNC: 25 MMOL/L (ref 21–32)
COLOR UR: YELLOW
CREAT SERPL-MCNC: 1.16 MG/DL (ref 0.6–1.3)
EOSINOPHIL # BLD AUTO: 0.67 THOUSAND/ΜL (ref 0–0.61)
EOSINOPHIL NFR BLD AUTO: 9 % (ref 0–6)
ERYTHROCYTE [DISTWIDTH] IN BLOOD BY AUTOMATED COUNT: 12.2 % (ref 11.6–15.1)
GFR SERPL CREATININE-BSD FRML MDRD: 78 ML/MIN/1.73SQ M
GLUCOSE SERPL-MCNC: 146 MG/DL (ref 65–140)
GLUCOSE UR STRIP-MCNC: NEGATIVE MG/DL
HCT VFR BLD AUTO: 50.5 % (ref 36.5–49.3)
HGB BLD-MCNC: 17.4 G/DL (ref 12–17)
HGB UR QL STRIP.AUTO: ABNORMAL
HOLD SPECIMEN: NORMAL
IMM GRANULOCYTES # BLD AUTO: 0.02 THOUSAND/UL (ref 0–0.2)
IMM GRANULOCYTES NFR BLD AUTO: 0 % (ref 0–2)
KETONES UR STRIP-MCNC: NEGATIVE MG/DL
LEUKOCYTE ESTERASE UR QL STRIP: NEGATIVE
LYMPHOCYTES # BLD AUTO: 1.9 THOUSANDS/ΜL (ref 0.6–4.47)
LYMPHOCYTES NFR BLD AUTO: 26 % (ref 14–44)
MAGNESIUM SERPL-MCNC: 1.9 MG/DL (ref 1.6–2.6)
MCH RBC QN AUTO: 32.3 PG (ref 26.8–34.3)
MCHC RBC AUTO-ENTMCNC: 34.5 G/DL (ref 31.4–37.4)
MCV RBC AUTO: 94 FL (ref 82–98)
MONOCYTES # BLD AUTO: 0.51 THOUSAND/ΜL (ref 0.17–1.22)
MONOCYTES NFR BLD AUTO: 7 % (ref 4–12)
NEUTROPHILS # BLD AUTO: 4.09 THOUSANDS/ΜL (ref 1.85–7.62)
NEUTS SEG NFR BLD AUTO: 57 % (ref 43–75)
NITRITE UR QL STRIP: NEGATIVE
NON-SQ EPI CELLS URNS QL MICRO: ABNORMAL /HPF
NRBC BLD AUTO-RTO: 0 /100 WBCS
PH UR STRIP.AUTO: 6 [PH]
PLATELET # BLD AUTO: 239 THOUSANDS/UL (ref 149–390)
PMV BLD AUTO: 10 FL (ref 8.9–12.7)
POTASSIUM SERPL-SCNC: 4.1 MMOL/L (ref 3.5–5.3)
PROT SERPL-MCNC: 7.7 G/DL (ref 6.4–8.2)
PROT UR STRIP-MCNC: NEGATIVE MG/DL
RBC # BLD AUTO: 5.39 MILLION/UL (ref 3.88–5.62)
RBC #/AREA URNS AUTO: ABNORMAL /HPF
SODIUM SERPL-SCNC: 140 MMOL/L (ref 136–145)
SP GR UR STRIP.AUTO: 1.02 (ref 1–1.03)
UROBILINOGEN UR QL STRIP.AUTO: 0.2 E.U./DL
WBC # BLD AUTO: 7.29 THOUSAND/UL (ref 4.31–10.16)
WBC #/AREA URNS AUTO: ABNORMAL /HPF

## 2019-04-01 PROCEDURE — 96374 THER/PROPH/DIAG INJ IV PUSH: CPT

## 2019-04-01 PROCEDURE — 81001 URINALYSIS AUTO W/SCOPE: CPT | Performed by: EMERGENCY MEDICINE

## 2019-04-01 PROCEDURE — 71101 X-RAY EXAM UNILAT RIBS/CHEST: CPT

## 2019-04-01 PROCEDURE — 99284 EMERGENCY DEPT VISIT MOD MDM: CPT | Performed by: EMERGENCY MEDICINE

## 2019-04-01 PROCEDURE — 80053 COMPREHEN METABOLIC PANEL: CPT | Performed by: EMERGENCY MEDICINE

## 2019-04-01 PROCEDURE — 85025 COMPLETE CBC W/AUTO DIFF WBC: CPT | Performed by: EMERGENCY MEDICINE

## 2019-04-01 PROCEDURE — 96361 HYDRATE IV INFUSION ADD-ON: CPT

## 2019-04-01 PROCEDURE — 74176 CT ABD & PELVIS W/O CONTRAST: CPT

## 2019-04-01 PROCEDURE — 99285 EMERGENCY DEPT VISIT HI MDM: CPT

## 2019-04-01 PROCEDURE — 83735 ASSAY OF MAGNESIUM: CPT | Performed by: EMERGENCY MEDICINE

## 2019-04-01 PROCEDURE — 36415 COLL VENOUS BLD VENIPUNCTURE: CPT | Performed by: EMERGENCY MEDICINE

## 2019-04-01 RX ORDER — LIDOCAINE 50 MG/G
2 PATCH TOPICAL ONCE
Status: DISCONTINUED | OUTPATIENT
Start: 2019-04-01 | End: 2019-04-01 | Stop reason: HOSPADM

## 2019-04-01 RX ORDER — IBUPROFEN 600 MG/1
600 TABLET ORAL EVERY 6 HOURS PRN
Qty: 30 TABLET | Refills: 0 | Status: SHIPPED | OUTPATIENT
Start: 2019-04-01 | End: 2019-07-15

## 2019-04-01 RX ORDER — KETOROLAC TROMETHAMINE 30 MG/ML
15 INJECTION, SOLUTION INTRAMUSCULAR; INTRAVENOUS ONCE
Status: COMPLETED | OUTPATIENT
Start: 2019-04-01 | End: 2019-04-01

## 2019-04-01 RX ORDER — LIDOCAINE 40 MG/G
CREAM TOPICAL
Qty: 30 G | Refills: 0 | Status: SHIPPED | OUTPATIENT
Start: 2019-04-01 | End: 2019-07-15

## 2019-04-01 RX ORDER — PANTOPRAZOLE SODIUM 40 MG/1
40 TABLET, DELAYED RELEASE ORAL DAILY
COMMUNITY
End: 2019-07-15

## 2019-04-01 RX ADMIN — LIDOCAINE 2 PATCH: 50 PATCH TOPICAL at 10:19

## 2019-04-01 RX ADMIN — KETOROLAC TROMETHAMINE 15 MG: 30 INJECTION, SOLUTION INTRAMUSCULAR; INTRAVENOUS at 08:37

## 2019-04-01 RX ADMIN — SODIUM CHLORIDE 500 ML: 0.9 INJECTION, SOLUTION INTRAVENOUS at 08:39

## 2019-07-02 ENCOUNTER — HOSPITAL ENCOUNTER (EMERGENCY)
Facility: HOSPITAL | Age: 39
Discharge: HOME | End: 2019-07-02
Attending: EMERGENCY MEDICINE

## 2019-07-02 ENCOUNTER — APPOINTMENT (EMERGENCY)
Dept: CT IMAGING | Facility: HOSPITAL | Age: 39
End: 2019-07-02

## 2019-07-02 VITALS
WEIGHT: 212 LBS | HEART RATE: 85 BPM | TEMPERATURE: 98.5 F | RESPIRATION RATE: 21 BRPM | SYSTOLIC BLOOD PRESSURE: 145 MMHG | DIASTOLIC BLOOD PRESSURE: 74 MMHG | BODY MASS INDEX: 30.35 KG/M2 | OXYGEN SATURATION: 94 % | HEIGHT: 70 IN

## 2019-07-02 DIAGNOSIS — I10 HTN (HYPERTENSION): ICD-10-CM

## 2019-07-02 DIAGNOSIS — E86.0 DEHYDRATION: ICD-10-CM

## 2019-07-02 DIAGNOSIS — G40.909 SEIZURE DISORDER (HCC): Primary | ICD-10-CM

## 2019-07-02 DIAGNOSIS — Z91.19 MEDICAL NON-COMPLIANCE: ICD-10-CM

## 2019-07-02 DIAGNOSIS — F10.239 ALCOHOL WITHDRAWAL SEIZURE (HCC): ICD-10-CM

## 2019-07-02 DIAGNOSIS — S09.90XA INJURY OF HEAD, INITIAL ENCOUNTER: ICD-10-CM

## 2019-07-02 DIAGNOSIS — N17.9 AKI (ACUTE KIDNEY INJURY) (HCC): ICD-10-CM

## 2019-07-02 DIAGNOSIS — R56.9 ALCOHOL WITHDRAWAL SEIZURE (HCC): ICD-10-CM

## 2019-07-02 LAB
ALBUMIN SERPL BCP-MCNC: 3.5 G/DL (ref 3.5–5)
ALP SERPL-CCNC: 79 U/L (ref 46–116)
ALT SERPL W P-5'-P-CCNC: 31 U/L (ref 12–78)
AMPHETAMINES SERPL QL SCN: NEGATIVE
ANION GAP SERPL CALCULATED.3IONS-SCNC: 14 MMOL/L (ref 4–13)
APAP SERPL-MCNC: <2 UG/ML (ref 10–20)
APTT PPP: 23 SECONDS (ref 23–37)
AST SERPL W P-5'-P-CCNC: 21 U/L (ref 5–45)
BACTERIA UR QL AUTO: ABNORMAL /HPF
BARBITURATES UR QL: NEGATIVE
BASOPHILS # BLD AUTO: 0.12 THOUSANDS/ΜL (ref 0–0.1)
BASOPHILS NFR BLD AUTO: 1 % (ref 0–1)
BENZODIAZ UR QL: NEGATIVE
BILIRUB SERPL-MCNC: 0.5 MG/DL (ref 0.2–1)
BILIRUB UR QL STRIP: NEGATIVE
BUN SERPL-MCNC: 10 MG/DL (ref 5–25)
CALCIUM SERPL-MCNC: 8.7 MG/DL (ref 8.3–10.1)
CHLORIDE SERPL-SCNC: 103 MMOL/L (ref 100–108)
CK SERPL-CCNC: 123 U/L (ref 39–308)
CLARITY UR: CLEAR
CO2 SERPL-SCNC: 21 MMOL/L (ref 21–32)
COCAINE UR QL: NEGATIVE
COLOR UR: YELLOW
CREAT SERPL-MCNC: 1.41 MG/DL (ref 0.6–1.3)
EOSINOPHIL # BLD AUTO: 0.38 THOUSAND/ΜL (ref 0–0.61)
EOSINOPHIL NFR BLD AUTO: 4 % (ref 0–6)
ERYTHROCYTE [DISTWIDTH] IN BLOOD BY AUTOMATED COUNT: 12.4 % (ref 11.6–15.1)
ETHANOL SERPL-MCNC: <3 MG/DL (ref 0–3)
GFR SERPL CREATININE-BSD FRML MDRD: 61 ML/MIN/1.73SQ M
GLUCOSE SERPL-MCNC: 108 MG/DL (ref 65–140)
GLUCOSE UR STRIP-MCNC: NEGATIVE MG/DL
HCT VFR BLD AUTO: 46.8 % (ref 36.5–49.3)
HGB BLD-MCNC: 16 G/DL (ref 12–17)
HGB UR QL STRIP.AUTO: ABNORMAL
IMM GRANULOCYTES # BLD AUTO: 0.03 THOUSAND/UL (ref 0–0.2)
IMM GRANULOCYTES NFR BLD AUTO: 0 % (ref 0–2)
INR PPP: 0.94 (ref 0.84–1.19)
KETONES UR STRIP-MCNC: NEGATIVE MG/DL
LACTATE SERPL-SCNC: 1.5 MMOL/L (ref 0.5–2)
LACTATE SERPL-SCNC: 7.8 MMOL/L (ref 0.5–2)
LEUKOCYTE ESTERASE UR QL STRIP: NEGATIVE
LIPASE SERPL-CCNC: 194 U/L (ref 73–393)
LYMPHOCYTES # BLD AUTO: 2.51 THOUSANDS/ΜL (ref 0.6–4.47)
LYMPHOCYTES NFR BLD AUTO: 28 % (ref 14–44)
MAGNESIUM SERPL-MCNC: 2.1 MG/DL (ref 1.6–2.6)
MCH RBC QN AUTO: 32.5 PG (ref 26.8–34.3)
MCHC RBC AUTO-ENTMCNC: 34.2 G/DL (ref 31.4–37.4)
MCV RBC AUTO: 95 FL (ref 82–98)
METHADONE UR QL: NEGATIVE
MONOCYTES # BLD AUTO: 0.75 THOUSAND/ΜL (ref 0.17–1.22)
MONOCYTES NFR BLD AUTO: 9 % (ref 4–12)
NEUTROPHILS # BLD AUTO: 5.07 THOUSANDS/ΜL (ref 1.85–7.62)
NEUTS SEG NFR BLD AUTO: 58 % (ref 43–75)
NITRITE UR QL STRIP: NEGATIVE
NON-SQ EPI CELLS URNS QL MICRO: ABNORMAL /HPF
NRBC BLD AUTO-RTO: 0 /100 WBCS
OPIATES UR QL SCN: NEGATIVE
PCP UR QL: NEGATIVE
PH UR STRIP.AUTO: 6 [PH]
PLATELET # BLD AUTO: 246 THOUSANDS/UL (ref 149–390)
PMV BLD AUTO: 9.6 FL (ref 8.9–12.7)
POTASSIUM SERPL-SCNC: 3.8 MMOL/L (ref 3.5–5.3)
PROT SERPL-MCNC: 7.3 G/DL (ref 6.4–8.2)
PROT UR STRIP-MCNC: ABNORMAL MG/DL
PROTHROMBIN TIME: 12.6 SECONDS (ref 11.6–14.5)
RBC # BLD AUTO: 4.92 MILLION/UL (ref 3.88–5.62)
RBC #/AREA URNS AUTO: ABNORMAL /HPF
SALICYLATES SERPL-MCNC: 4.6 MG/DL (ref 3–20)
SODIUM SERPL-SCNC: 138 MMOL/L (ref 136–145)
SP GR UR STRIP.AUTO: >=1.03 (ref 1–1.03)
THC UR QL: NEGATIVE
TROPONIN I SERPL-MCNC: <0.02 NG/ML
UROBILINOGEN UR QL STRIP.AUTO: 0.2 E.U./DL
WBC # BLD AUTO: 8.86 THOUSAND/UL (ref 4.31–10.16)
WBC #/AREA URNS AUTO: ABNORMAL /HPF

## 2019-07-02 PROCEDURE — 80053 COMPREHEN METABOLIC PANEL: CPT | Performed by: EMERGENCY MEDICINE

## 2019-07-02 PROCEDURE — 99284 EMERGENCY DEPT VISIT MOD MDM: CPT | Performed by: EMERGENCY MEDICINE

## 2019-07-02 PROCEDURE — 80320 DRUG SCREEN QUANTALCOHOLS: CPT | Performed by: EMERGENCY MEDICINE

## 2019-07-02 PROCEDURE — 93005 ELECTROCARDIOGRAM TRACING: CPT

## 2019-07-02 PROCEDURE — 80307 DRUG TEST PRSMV CHEM ANLYZR: CPT | Performed by: EMERGENCY MEDICINE

## 2019-07-02 PROCEDURE — 82550 ASSAY OF CK (CPK): CPT | Performed by: EMERGENCY MEDICINE

## 2019-07-02 PROCEDURE — 84484 ASSAY OF TROPONIN QUANT: CPT | Performed by: EMERGENCY MEDICINE

## 2019-07-02 PROCEDURE — 72125 CT NECK SPINE W/O DYE: CPT

## 2019-07-02 PROCEDURE — 83735 ASSAY OF MAGNESIUM: CPT | Performed by: EMERGENCY MEDICINE

## 2019-07-02 PROCEDURE — 99285 EMERGENCY DEPT VISIT HI MDM: CPT

## 2019-07-02 PROCEDURE — 70450 CT HEAD/BRAIN W/O DYE: CPT

## 2019-07-02 PROCEDURE — 84146 ASSAY OF PROLACTIN: CPT | Performed by: EMERGENCY MEDICINE

## 2019-07-02 PROCEDURE — 81001 URINALYSIS AUTO W/SCOPE: CPT | Performed by: EMERGENCY MEDICINE

## 2019-07-02 PROCEDURE — 85610 PROTHROMBIN TIME: CPT | Performed by: EMERGENCY MEDICINE

## 2019-07-02 PROCEDURE — 36415 COLL VENOUS BLD VENIPUNCTURE: CPT | Performed by: EMERGENCY MEDICINE

## 2019-07-02 PROCEDURE — 85730 THROMBOPLASTIN TIME PARTIAL: CPT | Performed by: EMERGENCY MEDICINE

## 2019-07-02 PROCEDURE — 96374 THER/PROPH/DIAG INJ IV PUSH: CPT

## 2019-07-02 PROCEDURE — 80329 ANALGESICS NON-OPIOID 1 OR 2: CPT | Performed by: EMERGENCY MEDICINE

## 2019-07-02 PROCEDURE — 83690 ASSAY OF LIPASE: CPT | Performed by: EMERGENCY MEDICINE

## 2019-07-02 PROCEDURE — 85025 COMPLETE CBC W/AUTO DIFF WBC: CPT | Performed by: EMERGENCY MEDICINE

## 2019-07-02 PROCEDURE — 83605 ASSAY OF LACTIC ACID: CPT | Performed by: EMERGENCY MEDICINE

## 2019-07-02 PROCEDURE — 96361 HYDRATE IV INFUSION ADD-ON: CPT

## 2019-07-02 RX ORDER — METOPROLOL SUCCINATE 25 MG/1
25 TABLET, EXTENDED RELEASE ORAL DAILY
Qty: 14 TABLET | Refills: 0 | Status: SHIPPED | OUTPATIENT
Start: 2019-07-02 | End: 2019-07-15

## 2019-07-02 RX ORDER — METOPROLOL SUCCINATE 25 MG/1
25 TABLET, EXTENDED RELEASE ORAL DAILY
Status: DISCONTINUED | OUTPATIENT
Start: 2019-07-03 | End: 2019-07-03 | Stop reason: HOSPADM

## 2019-07-02 RX ORDER — LORAZEPAM 2 MG/ML
1 INJECTION INTRAMUSCULAR ONCE
Status: COMPLETED | OUTPATIENT
Start: 2019-07-02 | End: 2019-07-02

## 2019-07-02 RX ORDER — LORATADINE 10 MG/1
10 CAPSULE, LIQUID FILLED ORAL
COMMUNITY
End: 2019-07-15

## 2019-07-02 RX ORDER — AMLODIPINE BESYLATE 5 MG/1
5 TABLET ORAL ONCE
Status: COMPLETED | OUTPATIENT
Start: 2019-07-02 | End: 2019-07-02

## 2019-07-02 RX ORDER — AMLODIPINE BESYLATE 2.5 MG/1
5 TABLET ORAL DAILY
Qty: 28 TABLET | Refills: 0 | Status: SHIPPED | OUTPATIENT
Start: 2019-07-02 | End: 2019-07-15 | Stop reason: ALTCHOICE

## 2019-07-02 RX ADMIN — SODIUM CHLORIDE 1000 ML: 0.9 INJECTION, SOLUTION INTRAVENOUS at 21:18

## 2019-07-02 RX ADMIN — SODIUM CHLORIDE 1000 ML: 0.9 INJECTION, SOLUTION INTRAVENOUS at 20:40

## 2019-07-02 RX ADMIN — LORAZEPAM 1 MG: 2 INJECTION INTRAMUSCULAR; INTRAVENOUS at 21:08

## 2019-07-02 RX ADMIN — AMLODIPINE BESYLATE 5 MG: 5 TABLET ORAL at 22:54

## 2019-07-03 LAB
ATRIAL RATE: 102 BPM
P AXIS: 27 DEGREES
PR INTERVAL: 156 MS
PROLACTIN SERPL-MCNC: 31 NG/ML (ref 2.5–17.4)
QRS AXIS: 33 DEGREES
QRSD INTERVAL: 106 MS
QT INTERVAL: 374 MS
QTC INTERVAL: 487 MS
T WAVE AXIS: 17 DEGREES
VENTRICULAR RATE: 102 BPM

## 2019-07-03 PROCEDURE — 93010 ELECTROCARDIOGRAM REPORT: CPT | Performed by: INTERNAL MEDICINE

## 2019-07-03 NOTE — DISCHARGE INSTRUCTIONS
You have had a seizure today  I reported this seizure to the Department of motor vehicle  You cannot drive until you are cleared by Neurology  Please refrain from alcohol use  Quickly stopping any alcohol consumption can lead to a  seizure  Your given the 1st dose of your blood pressure medications in the emergency department  Please make sure you follow-up with your family doctor for continued care of your blood pressure

## 2019-07-03 NOTE — ED PROVIDER NOTES
History  Chief Complaint   Patient presents with    Seizure - Prior Hx Of     3 minute witnesed tonic clonic seizure while [place] in [place]em[place] looking for a dog  [place] forward striking face, has small abrasion above R eye brow and tiny bite on tip of tongue but otherwise no apparent injury  Patient states he still feels 'off' and has a mild frontal headache but otherwise denies complaints  Patient is a 24-year-old male coming in today after having a seizure  Patient is a known alcoholic and drinks daily  He states he was 1st diagnosed with seizures approximately 1-2 years ago  He was supposed to be taking his medications of metoprolol and Norvasc but has not taking the med a month  He continued she does drink daily  He states today he was not drinking and was out helping a friend look [place] dog [place]  He does not have any preceding events of or IS  Family in room state that he just woke "collapsed down and hit his head off at cement"  Patient is alert oriented x3  She states that he had generalized tonic-clonic seizure like activity approximately 2-3 minutes  Patient denies any chest pain, palpitations, shortness of breath  He has no abdominal pain, nausea, vomiting  He has no weakness throughout the bilateral upper extremities and lower extremities    He denies any paresthesias throughout the bilateral upper extremities and lower extremities      History provided by:  Patient, relative and EMS personnel   used: No    Seizure - Prior Hx Of   Seizure activity on arrival: no    Seizure type:  Grand mal  Preceding symptoms: no sensation of an aura present, no dizziness, no euphoria, no headache, no hyperventilation, no nausea, no numbness, no panic and no vision change    Initial focality:  None  Episode characteristics: generalized shaking, tongue biting and unresponsiveness    Episode characteristics: no abnormal movements, no apnea, no combativeness, no confusion, no disorientation, no eye deviation, no focal shaking, no incontinence, no limpness, fully responsive and no stiffening    Return to baseline: yes    Severity:  Moderate  Timing:  Once  Progression:  Resolved  Context: alcohol withdrawal, change in medication and medical non-compliance    Context: not cerebral palsy, not sleeping less, not developmental delay, not drug use, not emotional upset, not family hx of seizures, not fever, not flashing visual stimuli, not hydrocephalus, not intracranial lesion, not intracranial shunt, not possible hypoglycemia, not possible medication ingestion, not previous head injury and not stress    Recent head injury:  No recent head injuries  PTA treatment:  None  History of seizures: yes        Prior to Admission Medications   Prescriptions Last Dose Informant Patient Reported? Taking?    Loratadine 10 MG CAPS   Yes Yes   Sig: Take 10 mg by mouth   amLODIPine (NORVASC) 10 mg tablet Not Taking at Unknown time  No No   Sig: Take 1 tablet (10 mg total) by mouth daily   Patient not taking: Reported on 7/2/2019   ibuprofen (MOTRIN) 600 mg tablet Past Month at Unknown time  No Yes   Sig: Take 1 tablet (600 mg total) by mouth every 6 (six) hours as needed (pain, fever (= 3 of the 200 mg OTC tablets))   lidocaine (LMX) 4 % cream Not Taking at Unknown time  No No   Sig: LMX, Gold Bond, etc  - look for * LIDOCAINE * on the label   Patient not taking: Reported on 7/2/2019   metoprolol succinate (TOPROL-XL) 50 mg 24 hr tablet Not Taking at Unknown time  No No   Sig: take 1 tablet by mouth once daily   Patient not taking: Reported on 7/2/2019   pantoprazole (PROTONIX) 40 mg tablet Not Taking at Unknown time  Yes No   Sig: Take 40 mg by mouth daily      Facility-Administered Medications: None       Past Medical History:   Diagnosis Date    Alcohol abuse     Asthma     GERD (gastroesophageal reflux disease)     w/o esophagitis    Hx of echocardiogram 06/18/2018    negative for ischemia    Hypertension     Seizures ([address] )     Early spring / late winter of 2018 was last and only seizure       Past Surgical History:   Procedure Laterality Date    CARPAL TUNNEL RELEASE Bilateral     CYST REMOVAL         Family History   Problem Relation Age of Onset    Stroke Father     Heart attack Father     Coronary artery disease Family         Less than 61 yrs of age   [facility] Seizures Neg Hx      I have reviewed and agree with the history as documented  Social History     Tobacco Use    Smoking status: Current Every Day Smoker     Packs/day: 1 50     Types: Cigarettes    Smokeless tobacco: Never Used   Substance Use Topics    Alcohol use: Yes     Comment: social    Drug use: No        Review of Systems   Constitutional: Negative for diaphoresis and fever  HENT: Negative  Negative for ear pain and sore throat  Eyes: Negative for visual disturbance  Respiratory: Negative  Negative for chest tightness and shortness of breath  Cardiovascular: Negative  Negative for chest pain and palpitations  Gastrointestinal: Negative  Negative for abdominal pain, nausea and vomiting  Genitourinary: Negative  Negative for difficulty urinating and dysuria  Musculoskeletal: Negative  Negative for back pain and neck pain  Skin: Negative for rash  Neurological: Positive for seizures  Negative for dizziness, facial asymmetry, weakness, light-headedness and headaches  Psychiatric/Behavioral: Negative for confusion  All other systems reviewed and are negative  Physical Exam  Physical Exam   Constitutional: He is oriented to person, place, and time  He appears well-developed and well-nourished  No distress  HENT:   Head: Normocephalic         Right Ear: Hearing, tympanic membrane, external ear and ear canal normal    Left Ear: Hearing, tympanic membrane, external ear and ear canal normal    Nose: Nose normal    Mouth/Throat: Oropharynx is clear and moist      Patient maintaining airway maintaining secretions  There is a small tongue hematoma to the left lateral   Aspect   Eyes: Pupils are equal, round, and reactive to light  Conjunctivae and EOM are normal    Neck: Normal range of motion  Neck supple  Cardiovascular: Regular rhythm, normal heart sounds and intact distal pulses  Tachycardia present  No murmur heard  Pulses:       Radial pulses are 2+ on the right side  Dorsalis pedis pulses are 2+ on the right side, and 2+ on the left side  Pulmonary/Chest: Effort normal and breath sounds normal  No stridor  No respiratory distress  Abdominal: Soft  Bowel sounds are normal  He exhibits no distension  There is no tenderness  Musculoskeletal: Normal range of motion  He exhibits no edema  Patient moves bilateral upper extremities and lower extremities independently of each other and pain-free   Neurological: He is alert and oriented to person, place, and time  No cranial nerve deficit  Negative pronator drift  No cerebellar findings  No facial droop  No slurred speec   Skin: Skin is warm  Capillary refill takes less than 2 seconds  He is not diaphoretic  Nursing note and vitals reviewed        Vital Signs  ED Triage Vitals [07/02/19 2029]   Temperature Pulse Respirations Blood Pressure SpO2   98 5 °F (36 9 °C) 104 20 (!) 175/98 96 %      Temp Source Heart Rate Source Patient Position - Orthostatic VS BP Location FiO2 (%)   Temporal Monitor Lying Left arm --      Pain Score       1           Vitals:    07/02/19 2130 07/02/19 2145 07/02/19 2200 07/02/19 2230   BP: 161/83 145/79 145/78 145/74   Pulse: 92 86 84 85   Patient Position - Orthostatic VS:             Visual Acuity  Visual Acuity      Most Recent Value   L Pupil Size (mm)  3   R Pupil Size (mm)  3          ED Medications  Medications   sodium chloride 0 9 % bolus 1,000 mL (0 mL Intravenous Stopped 7/2/19 2118)   LORazepam (ATIVAN) 2 mg/mL injection 1 mg (1 mg Intravenous Given 7/2/19 2108)   sodium chloride 0 9 % bolus 1,000 mL (0 mL Intravenous Stopped 7/2/19 2205)   amLODIPine (NORVASC) tablet 5 mg (5 mg Oral Given 7/2/19 2254)       Diagnostic Studies  Results Reviewed     Procedure Component Value Units Date/Time    Prolactin [[id number]]  (Abnormal) Collected:  07/02/19 2040    Lab Status:  Final result Specimen:  Blood from Arm, Left Updated:  07/03/19 0827     Prolactin 31 0 ng/mL     Lactic acid, plasma [[id number]]  (Normal) Collected:  07/02/19 2209    Lab Status:  Final result Specimen:  Blood from Arm, Left Updated:  07/02/19 2236     LACTIC ACID 1 5 mmol/L     Narrative:       Result may be elevated if tourniquet was used during collection  Urine Microscopic [[id number]]  (Abnormal) Collected:  07/02/19 2117    Lab Status:  Final result Specimen:  Urine, Clean Catch Updated:  07/02/19 2144     RBC, UA 4-10 /hpf      WBC, UA 0-1 /hpf      Epithelial Cells Occasional /hpf      Bacteria, UA Occasional /hpf     Rapid drug screen, urine [[id number]]  (Normal) Collected:  07/02/19 2117    Lab Status:  Final result Specimen:  Urine, Clean Catch Updated:  07/02/19 2142     Amph/Meth UR Negative     Barbiturate Ur Negative     Benzodiazepine Urine Negative     Cocaine Urine Negative     Methadone Urine Negative     Opiate Urine Negative     PCP Ur Negative     THC Urine Negative    Narrative:       FOR MEDICAL PURPOSES ONLY  IF CONFIRMATION NEEDED PLEASE CONTACT THE LAB WITHIN 5 DAYS      Drug Screen Cutoff Levels:  AMPHETAMINE/METHAMPHETAMINES  1000 ng/mL  BARBITURATES     200 ng/mL  BENZODIAZEPINES     200 ng/mL  COCAINE      300 ng/mL  METHADONE      300 ng/mL  OPIATES      300 ng/mL  PHENCYCLIDINE     25 ng/mL  THC       50 ng/mL      UA w Reflex to Microscopic [[id number]]  (Abnormal) Collected:  07/02/19 2117    Lab Status:  Final result Specimen:  Urine, Clean Catch Updated:  07/02/19 2128     Color, UA Yellow     Clarity, UA Clear     Specific Gravity, UA >=1 030     pH, UA 6 0     Leukocytes, UA Negative     Nitrite, UA Negative     Protein, UA 30 (1+) mg/dl      Glucose, UA Negative mg/dl      Ketones, UA Negative mg/dl      Urobilinogen, UA 0 2 E U /dl      Bilirubin, UA Negative     Blood, UA Small    Lactic acid, plasma [[id number]]  (Abnormal) Collected:  07/02/19 2040    Lab Status:  Final result Specimen:  Blood from Arm, Left Updated:  07/02/19 2121     LACTIC ACID 7 8 mmol/L     Narrative:       Result may be elevated if tourniquet was used during collection      Comprehensive metabolic panel [[id number]]  (Abnormal) Collected:  07/02/19 2040    Lab Status:  Final result Specimen:  Blood from Arm, Left Updated:  07/02/19 2109     Sodium 138 mmol/L      Potassium 3 8 mmol/L      Chloride 103 mmol/L      CO2 21 mmol/L      ANION GAP 14 mmol/L      BUN 10 mg/dL      Creatinine 1 41 mg/dL      Glucose 108 mg/dL      Calcium 8 7 mg/dL      AST 21 U/L      ALT 31 U/L      Alkaline Phosphatase 79 U/L      Total Protein 7 3 g/dL      Albumin 3 5 g/dL      Total Bilirubin 0 50 mg/dL      eGFR 61 ml/min/1 73sq m     Narrative:       [facility] guidelines for Chronic Kidney Disease (CKD):     Stage 1 with normal or high GFR (GFR > 90 mL/min/1 73 square meters)    Stage 2 Mild CKD (GFR = 60-89 mL/min/1 73 square meters)    Stage 3A Moderate CKD (GFR = 45-59 mL/min/1 73 square meters)    Stage 3B Moderate CKD (GFR = 30-44 mL/min/1 73 square meters)    Stage 4 Severe CKD (GFR = 15-29 mL/min/1 73 square meters)    Stage 5 End Stage CKD (GFR <15 mL/min/1 73 square meters)  Note: GFR calculation is accurate only with a steady state creatinine    Troponin I [[id number]]  (Normal) Collected:  07/02/19 2040    Lab Status:  Final result Specimen:  Blood from Arm, Left Updated:  07/02/19 2109     Troponin I <0 02 ng/mL     Magnesium [[id number]]  (Normal) Collected:  07/02/19 2040    Lab Status:  Final result Specimen:  Blood from Arm, Left Updated:  07/02/19 2108     Magnesium 2 1 mg/dL     Lipase [[id number]]  (Normal) Collected:  07/02/19 2040    Lab Status:  Final result Specimen:  Blood from Arm, Left Updated:  07/02/19 2108     Lipase 194 u/L     Acetaminophen level-"If concentration is detectable, please discuss with medical  on call " [[id number]]  (Abnormal) Collected:  07/02/19 2040    Lab Status:  Final result Specimen:  Blood from Arm, Left Updated:  07/02/19 2108     Acetaminophen Level <9 1 ug/mL     Salicylate level [[id number]]  (Normal) Collected:  07/02/19 2040    Lab Status:  Final result Specimen:  Blood from Arm, Left Updated:  43/47/01 2993     Salicylate Lvl 4 6 mg/dL     Ethanol [[id number]]  (Normal) Collected:  07/02/19 2040    Lab Status:  Final result Specimen:  Blood from Arm, Left Updated:  07/02/19 2107     Ethanol Lvl <3 mg/dL     CK Total with Reflex CKMB [[id number]]  (Normal) Collected:  07/02/19 2040    Lab Status:  Final result Specimen:  Blood from Arm, Left Updated:  07/02/19 2107     Total  U/L     Protime-INR [[id number]]  (Normal) Collected:  07/02/19 2040    Lab Status:  Final result Specimen:  Blood from Arm, Left Updated:  07/02/19 2058     Protime 12 6 seconds      INR 0 94    APTT [[id number]]  (Normal) Collected:  07/02/19 2040    Lab Status:  Final result Specimen:  Blood from Arm, Left Updated:  07/02/19 2058     PTT 23 seconds     CBC and differential [[id number]]  (Abnormal) Collected:  07/02/19 2040    Lab Status:  Final result Specimen:  Blood from Arm, Left Updated:  07/02/19 2050     WBC 8 86 Thousand/uL      RBC 4 92 Million/uL      Hemoglobin 16 0 g/dL      Hematocrit 46 8 %      MCV 95 fL      MCH 32 5 pg      MCHC 34 2 g/dL      RDW 12 4 %      MPV 9 6 fL      Platelets 107 Thousands/uL      nRBC 0 /100 WBCs      Neutrophils Relative 58 %      Immat GRANS % 0 %      Lymphocytes Relative 28 %      Monocytes Relative 9 %      Eosinophils Relative 4 %      Basophils Relative 1 %      Neutrophils Absolute 5 07 Thousands/µL      Immature Grans Absolute 0 03 Thousand/uL      Lymphocytes Absolute 2 51 Thousands/µL      Monocytes Absolute 0 75 Thousand/µL      Eosinophils Absolute 0 38 Thousand/µL      Basophils Absolute 0 12 Thousands/µL                  CT head without contrast   Final Result by Sonia Guo MD (07/02 2155)      No acute intracranial abnormality  Workstation performed: [id number]         CT spine cervical without contrast   Final Result by Karl Mariee MD (07/02 2212)      No cervical spine fracture or traumatic malalignment  Workstation performed: [id number]                    Procedures  Procedures       ED Course  ED Course as of Jul 03 1209   Tue Jul 02, 2019 2048  Patient is a 64-year-old male coming in today after noted seizure  Patient is a known alcoholic and did not drink today  He has had withdrawal seizures in the past   At this time he is hypertensive and tachycardic  Will give Ativan  Will also check basic labs as well as EKG and CT head  Portions of the record may have been created with voice recognition software  Occasional wrong word or "sound a like" substitutions may have occurred due to the inherent limitations of voice recognition software  Read the chart carefully and recognize, using context, where substitutions have occurred  2100   In review chart patient did have seizures and was seen by Neurology  He did have an EEG completed which was normal   He also had an MRI of the brain which was normal   Patient was on metoprolol and Norvasc for blood pressure control and was seen by Cardiology  This was in 2018 2110 Patient's labs thus far stable  No evidence of end-organ damage, anemia, electrolyte dysfunction, overdose  No tox allergic 0 event  He does have mild increasing creatinine will give 2 L  Will continue with CT         7021 Patient's labs are stable  Pending repeat lactate  Patient's vital signs are markedly improved  He has no evidence of end-organ damage    CT head is negative  CT cervical spine is negative      2217 Patient family updated the heart on workup  Patient resting in bed in no distress  He is neuro intact with no focal deficits  Patient understands that my concern of his seizures are due for alcohol withdrawal   He and his wife state that they do not believe so  I discussed with him that he needs refrain from alcohol  He also needs to follow up with Neurology and his PCP  Will give 1st dose of his antihypertensive ears with prescriptions for follow-up with PCP  Patient also is driving  I discussed with him as this is a repeat seizure he will need clearance by Neurology for clearance  I discussed with him that he cannot drive until he is seen by Neurology  He understands this  2234 Faxed the reporting form for driving to [employer] urSelf two times      2237 Lactate normal  Will give HTN meds and dc home  MDM  Number of Diagnoses or Management Options  Diagnosis management comments:     EKG INTERPRETATION   At 8:36 p m  RHYTHM: sinus tachy at 100 beats per  minute  AXIS normal axis  INTERVALS:   DC interval measured at 156 milliseconds  QRS COMPLEX:  QRS measured at 106 milliseconds  ST SEGMENT:  Nonspecific ST segment changes  Artifact present  QT INTERVAL:   QTC measured at 487 millisecond  COMPARED WITH PRIOR   Soo Daniels   Interpretation by Papa Gauthier,          Amount and/or Complexity of Data Reviewed  Clinical lab tests: ordered  Tests in the radiology section of CPT®: ordered  Tests in the medicine section of CPT®: ordered        Disposition  Final diagnoses:   Seiz[facility] disorder ([facility] )   Alcohol withdrawal seizure ([facility] )   HTN (hypertension)   Medical non-compliance   Dehydration   MADDY (acute kidney injury) ([facility] )   Injury of head, initial encounter     Time reflects when diagnosis was documented in both MDM as applicable and the Disposition within this note     Time User Action Codes Description Comment    7/2/2019 9:32 PM Nathan Ranch Add [Q69 414] Seizure disorder ([facility] )     7/2/2019  9:32 PM Nathan Ranch Add [K06 402,  R56 9] Alcohol withdrawal seizure ([facility] )     7/2/2019  9:32 PM Nathan Ranch Add [I10] HTN (hypertension)     7/2/2019  9:32 PM Nathan Ranch Add [Z91 19] Medical non-compliance     7/2/2019  9:32 PM Bendaleksandar, Bernardo Linker L Add [E86 0] Dehydration     7/2/2019  9:40 PM Bendaleksandar, Sherryn Linker L Add [N17 9] MADDY (acute kidney injury) ([place] [address] )     7/2/2019  9:42 PM Anayeli Carias Add [S09 90XA] Injury of head, initial encounter       ED Disposition     ED Disposition Condition Date/Time Comment    Discharge Stable Tue Jul 2, 2019 10:20 PM Desiraeechabel discharge to home/self care  Follow-up Information     Follow up With Specialties Details Why Contact Info Additional [address], [address], Nurse Practitioner Schedule an appointment as soon as possible for a visit in 2 days  [address]       Thelma Leblanc MD Neurology   [address] [place] [address] [phone]       [facility]   [address] [place] [phone]  5 [facility], [address] [place] [address], [phone]          Discharge Medication List as of 7/2/2019 10:33 PM      START taking these medications    Details   !! amLODIPine (NORVASC) 2 5 mg tablet Take 2 tablets (5 mg total) by mouth daily for 14 days, Starting Tue 7/2/2019, Until Tue 7/16/2019, Print      !! metoprolol succinate (TOPROL-XL) 25 mg 24 hr tablet Take 1 tablet (25 mg total) by mouth daily for 14 days, Starting Tue 7/2/2019, Until Tue 7/16/2019, Print       !! - Potential duplicate medications found  Please discuss with provider        CONTINUE these medications which have NOT CHANGED    Details   !! amLODIPine (NORVASC) 10 mg tablet Take 1 tablet (10 mg total) by mouth daily, Starting Fri 12/7/2018, Normal      ibuprofen (MOTRIN) 600 mg tablet Take 1 tablet (600 mg total) by mouth every 6 (six) hours as needed (pain, fever (= 3 of the 200 mg OTC tablets)), Starting Mon 4/1/2019, Print      lidocaine (LMX) 4 % cream LMX, Gold Bond, etc  - look for * LIDOCAINE * on the label, Print      Loratadine 10 MG CAPS Take 10 mg by mouth, Historical Med      !! metoprolol succinate (TOPROL-XL) 50 mg 24 hr tablet take 1 tablet by mouth once daily, Normal      pantoprazole (PROTONIX) 40 mg tablet Take 40 mg by mouth daily, Historical Med       !! - Potential duplicate medications found  Please discuss with provider  No discharge procedures on file      ED Provider  Electronically Signed by           [address], DO  07/03/19 3676

## 2019-07-05 ENCOUNTER — TELEPHONE (OUTPATIENT)
Dept: NEUROLOGY | Facility: CLINIC | Age: 39
End: 2019-07-05

## 2019-07-09 ENCOUNTER — CONSULT (OUTPATIENT)
Dept: NEUROLOGY | Facility: CLINIC | Age: 39
End: 2019-07-09

## 2019-07-09 VITALS
WEIGHT: 210 LBS | RESPIRATION RATE: 17 BRPM | SYSTOLIC BLOOD PRESSURE: 158 MMHG | BODY MASS INDEX: 30.06 KG/M2 | DIASTOLIC BLOOD PRESSURE: 94 MMHG | HEIGHT: 70 IN | HEART RATE: 68 BPM

## 2019-07-09 DIAGNOSIS — R56.9 SEIZURES (HCC): Primary | ICD-10-CM

## 2019-07-09 PROCEDURE — 99214 OFFICE O/P EST MOD 30 MIN: CPT | Performed by: PSYCHIATRY & NEUROLOGY

## 2019-07-09 NOTE — LETTER
July 9, 2019     [place], [address] [address]    Patient: Marbin Garcia   YOB: 1980   Date of Visit: 7/9/2019       Dear Dr Emma Rico: Thank you for referring [facility] to me for evaluation  Below are my notes for this consultation  If you have questions, please do not hesitate to call me  I look forward to following your patient along with you  Sincerely,        Iam Lyles MD        CC: MD Iam Davis MD  7/9/2019 11:48 AM  Sign at close encounter  Patient ID: Marbin Garcia is a 37 y o  male  Assessment/Plan:    Seizures ([facility] [address] )  A 2nd generalized motor seizure event on July 2nd and an earlier generalized motor seizure episode on 02/06/2018  Review of records from his previous event relate the feeling of his seizure being provoked an alcohol-related with an unremarkable study base including EEG and brain MRI  As result, no AED initiated  The 2nd event which occurred on July 2nd occurred some 48 hours as per patient since his last consumed alcohol beverage and his level on presentation to the ED was less than 3  Slightly longer than one might suspect from an alcohol withdrawal related basis although not impossible  However, not manifesting at that time any other potential withdrawal type symptoms  --has had a follow-up CT brain which is reported as unremarkable  Previous brain MRI unremarkable  --sleep-deprived EEG   --given the question of still a provoked event after long discussion was decided to hold off for now on initiating an antiepileptic medication  --the [employer] has already been notified of his July 2nd event and he is complying by not operating a motor vehicle, pending the results of additional study    --works as a  and from a seizure standpoint able to return to work with the following restrictions:  No operation of a motorized vehicle; no working at unprotected heights; avoid working around potentially dangerous machinery  He will follow up after completion of his EEG study  Subjective:    HPI  Patient, 37years of age and right-handed, presents in the aftermath of a generalized motor seizure which occurred on 07/02 2019  He states that he was out walking with his family in the early evening when without warning he suddenly found himself on the ground with police present  He had a described generalized motor seizure which by report persisted for a couple of minutes before resolving  He did encourage tongue bite  He did not have incontinence  He was taken to a local emergency department  CT brain was performed which was reported as unremarkable  Lab studies included an alcohol level less than 3, prolactin expected Ana elevated at 31 0, magnesium normal at 2 1, an unremarkable CMP and a CBC with hemoglobin 16 0, hematocrit 46 8, white count 8 86 and platelet count 504  He has had no further events  However, on 02/02 62018 he had a 1st generalized motor seizure  Evaluation at that time revealed an unremarkable MRI brain and a normal awake, drowsing and sleep EEG study  He was seen by Neurology  Because of his alcohol use at that time, it was felt that the event was provoked by alcohol and, as result, he was not started on an AED  We reviewed his recent alcohol consumption  He drinks beer  He relates that on a weekly basis he will consume perhaps sporadically a total of 2 6 packs  He states that prior to his July 2nd seizure he had no alcohol consumption for some 48 hours prior and was not describing any symptoms to suggest a potential for withdrawal circumstance  He reported his mother does have a seizure disorder        Past Medical History:   Diagnosis Date    Alcohol abuse     Asthma     GERD (gastroesophageal reflux disease)     w/o esophagitis    Hx of echocardiogram 06/18/2018    negative for ischemia    Hypertension     Seizures ([facility] [address] ) Early spring / late winter of 2018 was last and only seizure     Past Surgical History:   Procedure Laterality Date    CARPAL TUNNEL RELEASE Bilateral     CYST REMOVAL       Social History     Socioeconomic History    Marital status: /Civil Union     Spouse name: None    Number of children: None    Years of education: None    Highest education level: None   Occupational History    None   Social Needs    Financial resource strain: None    Food insecurity:     Worry: None     Inability: None    Transportation needs:     Medical: None     Non-medical: None   Tobacco Use    Smoking status: Current Every Day Smoker     Packs/day: 1 50     Types: Cigarettes    Smokeless tobacco: Never Used   Substance and Sexual Activity    Alcohol use: Yes     Comment: social    Drug use: No    Sexual activity: None   Lifestyle    Physical activity:     Days per week: None     Minutes per session: None    Stress: None   Relationships    Social connections:     Talks on phone: None     Gets together: None     Attends Zoroastrianism service: None     Active member of club or organization: None     Attends meetings of clubs or organizations: None     Relationship status: None    Intimate partner violence:     Fear of current or ex partner: None     Emotionally abused: None     Physically abused: None     Forced sexual activity: None   Other Topics Concern    None   Social History Narrative    Lives with in [place], with wife, four kids     Family History   Problem Relation Age of Onset    Stroke Father     Heart attack Father     Coronary artery disease Family         Less than 60 yrs of age    Seizures Neg Hx      Allergies   Allergen Reactions    Because [Nonoxynol 9]        Current Outpatient Medications:     amLODIPine (NORVASC) 2 5 mg tablet, Take 2 tablets (5 mg total) by mouth daily for 14 days, Disp: 28 tablet, Rfl: 0    ibuprofen (MOTRIN) 600 mg tablet, Take 1 tablet (600 mg total) by mouth every 6 (six) hours as needed (pain, fever (= 3 of the 200 mg OTC tablets)), Disp: 30 tablet, Rfl: 0    metoprolol succinate (TOPROL-XL) 25 mg 24 hr tablet, Take 1 tablet (25 mg total) by mouth daily for 14 days, Disp: 14 tablet, Rfl: 0    amLODIPine (NORVASC) 10 mg tablet, Take 1 tablet (10 mg total) by mouth daily (Patient not taking: Reported on 7/2/2019), Disp: 30 tablet, Rfl: 3    lidocaine (LMX) 4 % cream, LMX, Gold Bond, etc  - look for * LIDOCAINE * on the label (Patient not taking: Reported on 7/2/2019), Disp: 30 g, Rfl: 0    Loratadine 10 MG CAPS, Take 10 mg by mouth, Disp: , Rfl:     metoprolol succinate (TOPROL-XL) 50 mg 24 hr tablet, take 1 tablet by mouth once daily (Patient not taking: Reported on 7/2/2019), Disp: 30 tablet, Rfl: 5    pantoprazole (PROTONIX) 40 mg tablet, Take 40 mg by mouth daily, Disp: , Rfl:     Objective:    Blood pressure 158/94, pulse 68, resp  rate 17, height 5' 9 5" (1 765 m), weight 95 3 kg (210 lb)  Physical Exam  Head normocephalic  Eyes nonicteric  No audible anterior neck bruits  Lungs clear to auscultation  Rhythm regular  GI (abdomen) soft nontender  Bowel sounds present  No significant lower extremity edema  Neurological Exam  Alert  Pleasantly interactive  Fully oriented  No dysarthria  Unremarkable spontaneous gait  Able to heel and toe stand bilaterally  Romberg maneuver performed unremarkably  Able to tandem walk  Cranial Nerves:   I: Olfactory sense intact bilaterally  II: Visual fields full to confrontation  Pupils equal, round, reactive to light with normal accomodation  Fundus: with bilaterally marginated discs  III,IV,VI: Extraocular muscles EOMI, no nystagmus  V: Masseter and pterygoid strength  Sensation in the V1 through V3 distributions intact to pinprick and light touch bilaterally  VII: Face is symmetric with no weakness noted  VIII: Audition intact to finger rub bilaterally  IX/X: Uvula midline   Soft palate elevation symmetric  XI: Trapezius and SCM strength 5/5 bilaterally  XII: Tongue midline with no atrophy or fasciculations with appropriate movement  Accurate with finger-to-nose and heel-to-shin maneuvers bilaterally  Motor testing revealed full symmetrical strength throughout the 4 extremities with no upper extremity drift  Sensory testing grossly intact to pin, position and vibration throughout  Muscle stretch reflexes bilaterally 1 throughout the upper extremities and bilaterally 1+ at the knees and ankles  Toe response downgoing bilaterally  ROS:    Review of Systems   Constitutional: Positive for appetite change and fatigue  Negative for fever  HENT: Positive for hearing 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)  Negative for tinnitus, trouble swallowing and voice change  Eyes: Negative  Negative for photophobia and pain  Respiratory: Negative  Negative for shortness of breath  Cardiovascular: Negative  Negative for palpitations  Gastrointestinal: Positive for nausea  Negative for vomiting  Endocrine: Negative  Negative for cold intolerance and heat intolerance  Genitourinary: Negative  Negative for dysuria, frequency and urgency  Musculoskeletal: Negative  Negative for myalgias and neck pain  Skin: Negative  Negative for rash  Neurological: Positive for dizziness and seizures (7/2/19)  Negative for tremors, syncope, facial asymmetry, speech difficulty, weakness, light-headedness and numbness  Hematological: Negative  Does not bruise/bleed easily  Psychiatric/Behavioral: Negative  Negative for confusion, hallucinations and sleep disturbance  I personally reviewed the ROS that was entered by the medical assistant  *Please note this document was created using voice recognition software and may contain sound-alike word errors  *

## 2019-07-09 NOTE — PROGRESS NOTES
Patient ID: Kamryn Daley is a 37 y o  male  Assessment/Plan:    Se[facility]  A 2nd generalized motor seizure event on July 2nd and an earlier generalized motor seizure episode on 02/06/2018  Review of records from his previous event relate the feeling of his seizure being provoked an alcohol-related with an unremarkable study base including EEG and brain MRI  As result, no AED initiated  The 2nd event which occurred on July 2nd occurred some 48 hours as per patient since his last consumed alcohol beverage and his level on presentation to the ED was less than 3  Slightly longer than one might suspect from an alcohol withdrawal related basis although not impossible  However, not manifesting at that time any other potential withdrawal type symptoms  --has had a follow-up CT brain which is reported as unremarkable  Previous brain MRI unremarkable  --sleep-deprived EEG   --given the question of still a provoked event after long discussion was decided to hold off for now on initiating an antiepileptic medication  --the [employer] has already been notified of his July 2nd event and he is complying by not operating a motor vehicle, pending the results of additional study  --works as a  and from a seizure standpoint able to return to work with the following restrictions:  No operation of a motorized vehicle; no working at unprotected heights; avoid working around potentially dangerous machinery  He will follow up after completion of his EEG study  Subjective:    HPI  Patient, 37years of age and right-handed, presents in the aftermath of a generalized motor seizure which occurred on 07/02 2019  He states that he was out walking with his family in the early evening when without warning he suddenly found himself on the ground with police present  He had a described generalized motor seizure which by report persisted for a couple of minutes before resolving  He did encourage tongue bite  He did not have incontinence  He was taken to a local emergency department  CT brain was performed which was reported as unremarkable  Lab studies included an alcohol level less than 3, prolactin expected Ana elevated at 31 0, magnesium normal at 2 1, an unremarkable CMP and a CBC with hemoglobin 16 0, hematocrit 46 8, white count 8 86 and platelet count 417  He has had no further events  However, on 02/02 62018 he had a 1st generalized motor seizure  Evaluation at that time revealed an unremarkable MRI brain and a normal awake, drowsing and sleep EEG study  He was seen by Neurology  Because of his alcohol use at that time, it was felt that the event was provoked by alcohol and, as result, he was not started on an AED  We reviewed his recent alcohol consumption  He drinks beer  He relates that on a weekly basis he will consume perhaps sporadically a total of 2 6 packs  He states that prior to his July 2nd seizure he had no alcohol consumption for some 48 hours prior and was not describing any symptoms to suggest a potential for withdrawal circumstance  He reported his mother does have a seizure disorder        Past Medical History:   Diagnosis Date    Alcohol abuse     Asthma     GERD (gastroesophageal reflux disease)     w/o esophagitis    Hx of echocardiogram 06/18/2018    negative for ischemia    Hypertension     Seizures ([facility] [address] )     Early spring / late winter of 2018 was last and only seizure     Past Surgical History:   Procedure Laterality Date    CARPAL TUNNEL RELEASE Bilateral     CYST REMOVAL       Social History     Socioeconomic History    Marital status: /Civil Union     Spouse name: None    Number of children: None    Years of education: None    Highest education level: None   Occupational History    None   Social Needs    Financial resource strain: None    Food insecurity:     Worry: None     Inability: None    Transportation needs:     Medical: None     Non-medical: None   Tobacco Use    Smoking status: Current Every Day Smoker     Packs/day: 1 50     Types: Cigarettes    Smokeless tobacco: Never Used   Substance and Sexual Activity    Alcohol use: Yes     Comment: social    Drug use: No    Sexual activity: None   Lifestyle    Physical activity:     Days per week: None     Minutes per session: None    Stress: None   Relationships    Social connections:     Talks on phone: None     Gets together: None     Attends Tenriism service: None     Active member of club or organization: None     Attends meetings of clubs or organizations: None     Relationship status: None    Intimate partner violence:     Fear of current or ex partner: None     Emotionally abused: None     Physically abused: None     Forced sexual activity: None   Other Topics Concern    None   Social History Narrative    Lives with in [place], with wife, four kids     Family History   Problem Relation Age of Onset    Stroke Father     Heart attack Father     Coronary artery disease Family         Less than 60 yrs of age   Kathy Darcy Seizures Neg Hx      Allergies   Allergen Reactions    Because [Nonoxynol 9]        Current Outpatient Medications:     amLODIPine (NORVASC) 2 5 mg tablet, Take 2 tablets (5 mg total) by mouth daily for 14 days, Disp: 28 tablet, Rfl: 0    ibuprofen (MOTRIN) 600 mg tablet, Take 1 tablet (600 mg total) by mouth every 6 (six) hours as needed (pain, fever (= 3 of the 200 mg OTC tablets)), Disp: 30 tablet, Rfl: 0    metoprolol succinate (TOPROL-XL) 25 mg 24 hr tablet, Take 1 tablet (25 mg total) by mouth daily for 14 days, Disp: 14 tablet, Rfl: 0    amLODIPine (NORVASC) 10 mg tablet, Take 1 tablet (10 mg total) by mouth daily (Patient not taking: Reported on 7/2/2019), Disp: 30 tablet, Rfl: 3    lidocaine (LMX) 4 % cream, LMX, Gold Bond, etc  - look for * LIDOCAINE * on the label (Patient not taking: Reported on 7/2/2019), Disp: 30 g, Rfl: 0    Loratadine 10 MG CAPS, Take 10 mg by mouth, Disp: , Rfl:     metoprolol succinate (TOPROL-XL) 50 mg 24 hr tablet, take 1 tablet by mouth once daily (Patient not taking: Reported on 7/2/2019), Disp: 30 tablet, Rfl: 5    pantoprazole (PROTONIX) 40 mg tablet, Take 40 mg by mouth daily, Disp: , Rfl:     Objective:    Blood pressure 158/94, pulse 68, resp  rate 17, height 5' 9 5" (1 765 m), weight 95 3 kg (210 lb)  Physical Exam  Head normocephalic  Eyes nonicteric  No audible anterior neck bruits  Lungs clear to auscultation  Rhythm regular  GI (abdomen) soft nontender  Bowel sounds present  No significant lower extremity edema  Neurological Exam  Alert  Pleasantly interactive  Fully oriented  No dysarthria  Unremarkable spontaneous gait  Able to heel and toe stand bilaterally  Romberg maneuver performed unremarkably  Able to tandem walk  Cranial Nerves:   I: Olfactory sense intact bilaterally  II: Visual fields full to confrontation  Pupils equal, round, reactive to light with normal accomodation  Fundus: with bilaterally marginated discs  III,IV,VI: Extraocular muscles EOMI, no nystagmus  V: Masseter and pterygoid strength  Sensation in the V1 through V3 distributions intact to pinprick and light touch bilaterally  VII: Face is symmetric with no weakness noted  VIII: Audition intact to finger rub bilaterally  IX/X: Uvula midline  Soft palate elevation symmetric  XI: Trapezius and SCM strength 5/5 bilaterally  XII: Tongue midline with no atrophy or fasciculations with appropriate movement  Accurate with finger-to-nose and heel-to-shin maneuvers bilaterally  Motor testing revealed full symmetrical strength throughout the 4 extremities with no upper extremity drift  Sensory testing grossly intact to pin, position and vibration throughout  Muscle stretch reflexes bilaterally 1 throughout the upper extremities and bilaterally 1+ at the knees and ankles  Toe response downgoing bilaterally      ROS:    Review of Systems   Constitutional: Positive for appetite change and fatigue  Negative for fever  HENT: Positive for hearing 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)  Negative for tinnitus, trouble swallowing and voice change  Eyes: Negative  Negative for photophobia and pain  Respiratory: Negative  Negative for shortness of breath  Cardiovascular: Negative  Negative for palpitations  Gastrointestinal: Positive for nausea  Negative for vomiting  Endocrine: Negative  Negative for cold intolerance and heat intolerance  Genitourinary: Negative  Negative for dysuria, frequency and urgency  Musculoskeletal: Negative  Negative for myalgias and neck pain  Skin: Negative  Negative for rash  Neurological: Positive for dizziness and seizures (7/2/19)  Negative for tremors, syncope, facial asymmetry, speech difficulty, weakness, light-headedness and numbness  Hematological: Negative  Does not bruise/bleed easily  Psychiatric/Behavioral: Negative  Negative for confusion, hallucinations and sleep disturbance  I personally reviewed the ROS that was entered by the medical assistant  *Please note this document was created using voice recognition software and may contain sound-alike word errors  *

## 2019-07-09 NOTE — ASSESSMENT & PLAN NOTE
A 2nd generalized motor seizure event on July 2nd and an earlier generalized motor seizure episode on 02/06/2018  Review of records from his previous event relate the feeling of his seizure being provoked an alcohol-related with an unremarkable study base including EEG and brain MRI  As result, no AED initiated  The 2nd event which occurred on July 2nd occurred some 48 hours as per patient since his last consumed alcohol beverage and his level on presentation to the ED was less than 3  Slightly longer than one might suspect from an alcohol withdrawal related basis although not impossible  However, not manifesting at that time any other potential withdrawal type symptoms  --has had a follow-up CT brain which is reported as unremarkable  Previous brain MRI unremarkable  --sleep-deprived EEG   --given the question of still a provoked event after long discussion was decided to hold off for now on initiating an antiepileptic medication  --the [employer] has already been notified of his July 2nd event and he is complying by not operating a motor vehicle, pending the results of additional study  --works as a  and from a seizure standpoint able to return to work with the following restrictions:  No operation of a motorized vehicle; no working at unprotected heights; avoid working around potentially dangerous machinery

## 2019-07-09 NOTE — PATIENT INSTRUCTIONS
Continue your driving restriction  Should you have any further seizure or seizure-like symptoms before your scheduled follow-up call the office  May return to work as an  with the following restrictions:  --no operation of a motorized vehicle  --no working at unprotected heights   --avoid working around potentially dangerous machinery

## 2019-07-15 ENCOUNTER — OFFICE VISIT (OUTPATIENT)
Dept: FAMILY MEDICINE CLINIC | Facility: HOME HEALTHCARE | Age: 39
End: 2019-07-15
Payer: COMMERCIAL

## 2019-07-15 VITALS
HEIGHT: 70 IN | HEART RATE: 74 BPM | BODY MASS INDEX: 29.78 KG/M2 | OXYGEN SATURATION: 98 % | DIASTOLIC BLOOD PRESSURE: 98 MMHG | RESPIRATION RATE: 16 BRPM | SYSTOLIC BLOOD PRESSURE: 158 MMHG | WEIGHT: 208 LBS | TEMPERATURE: 97.8 F

## 2019-07-15 DIAGNOSIS — R56.9 SEIZURES (HCC): Primary | ICD-10-CM

## 2019-07-15 DIAGNOSIS — I10 HTN (HYPERTENSION): ICD-10-CM

## 2019-07-15 PROCEDURE — T1015 CLINIC SERVICE: HCPCS | Performed by: FAMILY MEDICINE

## 2019-07-15 RX ORDER — AMLODIPINE BESYLATE 2.5 MG/1
2.5 TABLET ORAL DAILY
Qty: 30 TABLET | Refills: 2 | Status: SHIPPED | OUTPATIENT
Start: 2019-07-15 | End: 2019-10-21 | Stop reason: SDUPTHER

## 2019-07-15 RX ORDER — METOPROLOL SUCCINATE 50 MG/1
50 TABLET, EXTENDED RELEASE ORAL DAILY
COMMUNITY
End: 2019-07-15 | Stop reason: SDUPTHER

## 2019-07-15 RX ORDER — METOPROLOL SUCCINATE 50 MG/1
50 TABLET, EXTENDED RELEASE ORAL DAILY
Qty: 30 TABLET | Refills: 2 | Status: SHIPPED | OUTPATIENT
Start: 2019-07-15 | End: 2019-10-21 | Stop reason: SDUPTHER

## 2019-07-15 NOTE — PROGRESS NOTES
[address]       NAME: Cade Tyler is a 37 y o  male  : 1980    MRN: [id number]  DATE: July 15, 2019  TIME: 1:30 PM    Assessment and Plan   Diagnoses and all orders for this visit:    Essential hypertension  -     CBC and differential; Future  -     Comprehensive metabolic panel; Future  -     Lipid panel; Future  -     HEMOGLOBIN A1C W/ EAG ESTIMATION; Future    Encounter for medical examination to establish care  -     TSH, 3rd generation with Free T4 reflex; Future    Seizures ([facility] [address] )    Other orders  -     metoprolol succinate (TOPROL-XL) 50 mg 24 hr tablet; Take 50 mg by mouth daily        No problem-specific Assessment & Plan notes found for this encounter  Patient Instructions     Patient became agigiated that I could not reinstate his [place] license encourage follow up with neurology ad keep appt for EEG  Next week       Chief Complaint     Chief Complaint   Patient presents with    Seizures         History of Present Illness       Had one seizure 1 5 years ago and than had a second seizure 2 weeks ago  He had a follow up with Dr Tiffanie loja week had seen Dr Seun Edgar at of [facility] for initial seizure  He does report maternal history of seizure  He does report he has a sleep deprived EEG scheduled next week  He reports increase stress levels and poor gum health and he also stated his mother has seizures approx 1 time a year he is not sure if his mother is on any anticonvulsants medications       Review of Systems   Review of Systems   Constitutional: Negative  HENT: Negative  Eyes: Negative  Respiratory: Negative  Cardiovascular: Negative  Gastrointestinal: Negative  Endocrine: Negative  Genitourinary: Negative  Musculoskeletal: Negative  Skin: Negative  Allergic/Immunologic: Negative  Neurological: Negative  Hematological: Negative  Psychiatric/Behavioral: Negative  All other systems reviewed and are negative          Current Medications       Current Outpatient Medications:     amLODIPine (NORVASC) 2 5 mg tablet, Take 2 tablets (5 mg total) by mouth daily for 14 days, Disp: 28 tablet, Rfl: 0    metoprolol succinate (TOPROL-XL) 50 mg 24 hr tablet, Take 50 mg by mouth daily, Disp: , Rfl:     Current Allergies     Allergies as of 07/15/2019 - Reviewed 07/15/2019   Allergen Reaction Noted    Because [nonoxynol 9]  02/26/2018            The following portions of the patient's history were reviewed and updated as appropriate: allergies, current medications, past family history, past medical history, past social history, past surgical history and problem list      Past Medical History:   Diagnosis Date    Alcohol abuse     Asthma     GERD (gastroesophageal reflux disease)     w/o esophagitis    Hx of echocardiogram 06/18/2018    negative for ischemia    Hypertension     Seizures ([address] )     Early spring / late winter of 2018 was last and only seizure       Past Surgical History:   Procedure Laterality Date    CARPAL TUNNEL RELEASE Bilateral     CYST REMOVAL         Family History   Problem Relation Age of Onset    Stroke Father     Heart attack Father     Coronary artery disease Family         Less than 60 yrs of age    Seizures Neg Hx          Medications have been verified  Objective   /98 (BP Location: Right arm, Patient Position: Sitting, Cuff Size: Large)   Pulse 74   Temp 97 8 °F (36 6 °C) (Temporal)   Resp 16   Ht 5' 10" (1 778 m)   Wt 94 3 kg (208 lb)   SpO2 98%   BMI 29 84 kg/m²        Physical Exam     Physical Exam   Constitutional: He is oriented to person, place, and time  Vital signs are normal  He appears well-developed  HENT:   Head: Normocephalic and atraumatic  Right Ear: External ear normal    Left Ear: External ear normal    Nose: Nose normal    Mouth/Throat: Oropharynx is clear and moist    Eyes: Pupils are equal, round, and reactive to light   Conjunctivae, EOM and lids are normal  Neck: Normal range of motion  Neck supple  Cardiovascular: Normal rate, regular rhythm, normal heart sounds and intact distal pulses  Pulmonary/Chest: Effort normal and breath sounds normal    Abdominal: Soft  Normal appearance and bowel sounds are normal    Musculoskeletal: Normal range of motion  Neurological: He is alert and oriented to person, place, and time  He has normal reflexes  Skin: Skin is warm, dry and intact  Psychiatric: He has a normal mood and affect  His speech is normal and behavior is normal  Judgment and thought content normal    Nursing note and vitals reviewed

## 2019-07-17 ENCOUNTER — HOSPITAL ENCOUNTER (OUTPATIENT)
Dept: NEUROLOGY | Facility: HOSPITAL | Age: 39
Discharge: HOME | End: 2019-07-17

## 2019-07-17 DIAGNOSIS — R56.9 SEIZURES (HCC): ICD-10-CM

## 2019-07-17 PROCEDURE — 95816 EEG AWAKE AND DROWSY: CPT | Performed by: PSYCHIATRY & NEUROLOGY

## 2019-07-17 PROCEDURE — 95819 EEG AWAKE AND ASLEEP: CPT

## 2019-07-18 ENCOUNTER — TELEPHONE (OUTPATIENT)
Dept: NEUROLOGY | Facility: CLINIC | Age: 39
End: 2019-07-18

## 2019-07-30 ENCOUNTER — OFFICE VISIT (OUTPATIENT)
Dept: NEUROLOGY | Facility: CLINIC | Age: 39
End: 2019-07-30
Payer: COMMERCIAL

## 2019-07-30 VITALS
HEART RATE: 79 BPM | SYSTOLIC BLOOD PRESSURE: 168 MMHG | WEIGHT: 212.2 LBS | DIASTOLIC BLOOD PRESSURE: 102 MMHG | HEIGHT: 70 IN | BODY MASS INDEX: 30.38 KG/M2 | RESPIRATION RATE: 17 BRPM

## 2019-07-30 DIAGNOSIS — R56.9 SEIZURES (HCC): Primary | ICD-10-CM

## 2019-07-30 PROCEDURE — 99213 OFFICE O/P EST LOW 20 MIN: CPT | Performed by: PSYCHIATRY & NEUROLOGY

## 2019-07-30 RX ORDER — LEVETIRACETAM 500 MG/1
500 TABLET ORAL EVERY 12 HOURS SCHEDULED
Qty: 60 TABLET | Refills: 5 | Status: SHIPPED | OUTPATIENT
Start: 2019-07-30 | End: 2020-01-08 | Stop reason: DRUGHIGH

## 2019-07-30 NOTE — ASSESSMENT & PLAN NOTE
First seizure event [id number]  Second seizure [id number]  First event felt to likely be secondary to alcohol off/alcohol withdrawal   However, the 2nd event occurred after he had been without any alcohol intake for some 48 hours and at the time of the seizure he was not exhibiting features to suggest any issues with withdrawal   As result need to question the 2nd event as one occurring as result of alcohol withdrawal   Given that, his history of past head injury, and the fact that this was a 2nd seizure event, after discussion it was felt appropriate to start coverage with an antiepileptic medication  --he will begin levetiracetam 500 mg tablets taking 1 tablet twice daily  --side effects reviewed  --[employer] seizure form filled out and faxed  --patient aware that he is not to drive and already has a letter requesting that his license be surrounded  --has been reportedly abstinent from all alcohol over the past 2 months  Will be having his PCP fill out the [employer] substance abuse form  --I have asked that he have a baseline CBC and CMP drawn 4 weeks after beginning levetiracetam the blood work is drawn to provide a status update    He is to call before then should he have suspicious seizure or seizure-like events, questions, concerns or difficulties tolerating the levetiracetam

## 2019-07-30 NOTE — PATIENT INSTRUCTIONS
Start levetiracetam 500 mg tablets taking 1 tablet twice daily  Have your blood work done 4 weeks after starting the levetiracetam     After you have your blood work drawn please call the office with a status report as to how you are doing  Call before then should you have any questions or concerns

## 2019-07-30 NOTE — LETTER
July 30, 2019     [place] T[place], [address] [facility] [address]    Patient: Lizzie Gleason   YOB: 1980   Date of Visit: 7/30/2019       Dear Dr Kirsty Das: Thank you for referring Marissa Silva to me for evaluation  Below are my notes for this consultation  If you have questions, please do not hesitate to call me  I look forward to following your patient along with you  Sincerely,        Franklin Trotter MD        CC: No Recipients  Franklin Trotter MD  7/30/2019  3:20 PM  Sign at close encounter  Patient ID: Lizzie Gleason is a 37 y o  male  Assessment/Plan:    Seizures ([facility] [address] )  First seizure event [id number]  Second seizure [id number]  First event felt to likely be secondary to alcohol off/alcohol withdrawal   However, the 2nd event occurred after he had been without any alcohol intake for some 48 hours and at the time of the seizure he was not exhibiting features to suggest any issues with withdrawal   As result need to question the 2nd event as one occurring as result of alcohol withdrawal   Given that, his history of past head injury, and the fact that this was a 2nd seizure event, after discussion it was felt appropriate to start coverage with an antiepileptic medication  --he will begin levetiracetam 500 mg tablets taking 1 tablet twice daily  --side effects reviewed  --[employer] seizure form filled out and faxed  --patient aware that he is not to drive and already has a letter requesting that his license be surrounded  --has been reportedly abstinent from all alcohol over the past 2 months  Will be having his PCP fill out the [employer] substance abuse form  --I have asked that he have a baseline CBC and CMP drawn 4 weeks after beginning levetiracetam the blood work is drawn to provide a status update    He is to call before then should he have suspicious seizure or seizure-like events, questions, concerns or difficulties tolerating the levetiracetam         He will follow up in 8-10 weeks  Subjective:    HPI  Patient, age 37 years, returns to review the results of his EEG study and to consider the appropriateness of starting an antiepileptic medication  In summary, he experienced on July 2nd of this year a generalized motor seizure  There was some suggestion this might be on an alcohol related basis  However, on questioning, the event occurred some 48 hours after his last drink and at the time of the seizure he was exhibiting no features that would suggest that he was in any way in the process of withdrawal   He does have a history of an earlier seizure which occurred in February 2018 which was also question as provoked by alcohol  There is also history of head injury in the past   With regard to alcohol consumption, he states that he has now been abstinent of all alcohol for some 2 full months      Past Medical History:   Diagnosis Date    Alcohol abuse     Asthma     GERD (gastroesophageal reflux disease)     w/o esophagitis    Hx of echocardiogram 06/18/2018    negative for ischemia    Hypertension     Seizures ([address] )     Early spring / late winter of 2018 was last and only seizure     Past Surgical History:   Procedure Laterality Date    CARPAL TUNNEL RELEASE Bilateral     CYST REMOVAL       Social History     Socioeconomic History    Marital status: /Civil Union     Spouse name: None    Number of children: None    Years of education: None    Highest education level: None   Occupational History    None   Social Needs    Financial resource strain: None    Food insecurity:     Worry: None     Inability: None    Transportation needs:     Medical: None     Non-medical: None   Tobacco Use    Smoking status: Current Every Day Smoker     Packs/day: 1 50     Types: Cigarettes    Smokeless tobacco: Never Used   Substance and Sexual Activity    Alcohol use: Yes     Comment: social    Drug use: No    Sexual activity: None Lifestyle    Physical activity:     Days per week: None     Minutes per session: None    Stress: None   Relationships    Social connections:     Talks on phone: None     Gets together: None     Attends Nondenominational service: None     Active member of club or organization: None     Attends meetings of clubs or organizations: None     Relationship status: None    Intimate partner violence:     Fear of current or ex partner: None     Emotionally abused: None     Physically abused: None     Forced sexual activity: None   Other Topics Concern    None   Social History Narrative    Lives with in [facility], with wife, four kids     Family History   Problem Relation Age of Onset    Stroke Father     Heart attack Father     Coronary artery disease Family         Less than 60 yrs of age   [facility] Seizures Neg Hx      Allergies   Allergen Reactions    Because [Nonoxynol 9]        Current Outpatient Medications:     amLODIPine (NORVASC) 2 5 mg tablet, Take 1 tablet (2 5 mg total) by mouth daily for 90 days, Disp: 30 tablet, Rfl: 2    levETIRAcetam (KEPPRA) 500 mg tablet, Take 1 tablet (500 mg total) by mouth every 12 (twelve) hours, Disp: 60 tablet, Rfl: 5    metoprolol succinate (TOPROL-XL) 50 mg 24 hr tablet, Take 1 tablet (50 mg total) by mouth daily for 90 days, Disp: 30 tablet, Rfl: 2    Objective:    Blood pressure (!) 168/102, pulse 79, resp  rate 17, height 5' 10" (1 778 m), weight 96 3 kg (212 lb 3 2 oz)  Physical Exam  Lungs clear to auscultation  Rhythm regular  Neurological Exam  Alert  Fully oriented  Appropriately conversational   Unremarkable spontaneous gait  Cranial nerves 2-12 tested and grossly intact  Essentially full symmetrical strength throughout the 4 extremities with no upper extremity drift  Muscle stretch reflexes bilaterally 1 throughout the upper extremities and bilaterally 1+ at the knees and ankles  ROS:    Review of Systems   Constitutional: Negative    Negative for appetite change and fever  HENT: Positive for ear discharge (right ear draining due to allergies)  Negative for hearing loss, tinnitus, trouble swallowing and voice change  Eyes: Negative  Negative for photophobia and pain  Respiratory: Negative  Negative for shortness of breath  Cardiovascular: Negative  Negative for palpitations  Gastrointestinal: Negative  Negative for nausea and vomiting  Endocrine: Negative  Negative for cold intolerance and heat intolerance  Genitourinary: Negative  Negative for dysuria, frequency and urgency  Musculoskeletal: Negative  Negative for myalgias and neck pain  Skin: Negative  Negative for rash  Allergic/Immunologic: Negative  Neurological: Negative  Negative for dizziness, tremors, seizures, syncope, facial asymmetry, speech difficulty, weakness, light-headedness, numbness and headaches  Hematological: Negative  Does not bruise/bleed easily  Psychiatric/Behavioral: Negative  Negative for confusion, hallucinations and sleep disturbance  I personally reviewed the ROS that was entered by the medical assistant  *Please note this document was created using voice recognition software and may contain sound-alike word errors  *

## 2019-07-30 NOTE — PROGRESS NOTES
Patient ID: Faisal Shah is a 37 y o  male  Assessment/Plan:    Seizures ([facility] [address] )  First seizure event [id number]  Second seizure [id number]  First event felt to likely be secondary to alcohol off/alcohol withdrawal   However, the 2nd event occurred after he had been without any alcohol intake for some 48 hours and at the time of the seizure he was not exhibiting features to suggest any issues with withdrawal   As result need to question the 2nd event as one occurring as result of alcohol withdrawal   Given that, his history of past head injury, and the fact that this was a 2nd seizure event, after discussion it was felt appropriate to start coverage with an antiepileptic medication  --he will begin levetiracetam 500 mg tablets taking 1 tablet twice daily  --side effects reviewed  --[employer] seizure form filled out and faxed  --patient aware that he is not to drive and already has a letter requesting that his license be surrounded  --has been reportedly abstinent from all alcohol over the past 2 months  Will be having his PCP fill out the [employer] substance abuse form  --I have asked that he have a baseline CBC and CMP drawn 4 weeks after beginning levetiracetam the blood work is drawn to provide a status update  He is to call before then should he have suspicious seizure or seizure-like events, questions, concerns or difficulties tolerating the levetiracetam         He will follow up in 8-10 weeks  Subjective:    HPI  Patient, age 37 years, returns to review the results of his EEG study and to consider the appropriateness of starting an antiepileptic medication  In summary, he experienced on July 2nd of this year a generalized motor seizure  There was some suggestion this might be on an alcohol related basis    However, on questioning, the event occurred some 48 hours after his last drink and at the time of the seizure he was exhibiting no features that would suggest that he was in any way in the process of withdrawal   He does have a history of an earlier seizure which occurred in February 2018 which was also question as provoked by alcohol  There is also history of head injury in the past   With regard to alcohol consumption, he states that he has now been abstinent of all alcohol for some 2 full months      Past Medical History:   Diagnosis Date    Alcohol abuse     Asthma     GERD (gastroesophageal reflux disease)     w/o esophagitis    Hx of echocardiogram 06/18/2018    negative for ischemia    Hypertension     Seizures ([address] )     Early spring / late winter of 2018 was last and only seizure     Past Surgical History:   Procedure Laterality Date    CARPAL TUNNEL RELEASE Bilateral     CYST REMOVAL       Social History     Socioeconomic History    Marital status: /Civil Union     Spouse name: None    Number of children: None    Years of education: None    Highest education level: None   Occupational History    None   Social Needs    Financial resource strain: None    Food insecurity:     Worry: None     Inability: None    Transportation needs:     Medical: None     Non-medical: None   Tobacco Use    Smoking status: Current Every Day Smoker     Packs/day: 1 50     Types: Cigarettes    Smokeless tobacco: Never Used   Substance and Sexual Activity    Alcohol use: Yes     Comment: social    Drug use: No    Sexual activity: None   Lifestyle    Physical activity:     Days per week: None     Minutes per session: None    Stress: None   Relationships    Social connections:     Talks on phone: None     Gets together: None     Attends Mandaen service: None     Active member of club or organization: None     Attends meetings of clubs or organizations: None     Relationship status: None    Intimate partner violence:     Fear of current or ex partner: None     Emotionally abused: None     Physically abused: None     Forced sexual activity: None   Other Topics Concern    None   Social History Narrative    Lives with in [place], with wife, four kids     Family History   Problem Relation Age of Onset    Stroke Father     Heart attack Father     Coronary artery disease Family         Less than 61 yrs of age   [facility] Seizures Neg Hx      Allergies   Allergen Reactions    Because [Nonoxynol 9]        Current Outpatient Medications:     amLODIPine (NORVASC) 2 5 mg tablet, Take 1 tablet (2 5 mg total) by mouth daily for 90 days, Disp: 30 tablet, Rfl: 2    levETIRAcetam (KEPPRA) 500 mg tablet, Take 1 tablet (500 mg total) by mouth every 12 (twelve) hours, Disp: 60 tablet, Rfl: 5    metoprolol succinate (TOPROL-XL) 50 mg 24 hr tablet, Take 1 tablet (50 mg total) by mouth daily for 90 days, Disp: 30 tablet, Rfl: 2    Objective:    Blood pressure (!) 168/102, pulse 79, resp  rate 17, height 5' 10" (1 778 m), weight 96 3 kg (212 lb 3 2 oz)  Physical Exam  Lungs clear to auscultation  Rhythm regular  Neurological Exam  Alert  Fully oriented  Appropriately conversational   Unremarkable spontaneous gait  Cranial nerves 2-12 tested and grossly intact  Essentially full symmetrical strength throughout the 4 extremities with no upper extremity drift  Muscle stretch reflexes bilaterally 1 throughout the upper extremities and bilaterally 1+ at the knees and ankles  ROS:    Review of Systems   Constitutional: Negative  Negative for appetite change and fever  HENT: Positive for ear discharge (right ear draining due to allergies)  Negative for hearing loss, tinnitus, trouble swallowing and voice change  Eyes: Negative  Negative for photophobia and pain  Respiratory: Negative  Negative for shortness of breath  Cardiovascular: Negative  Negative for palpitations  Gastrointestinal: Negative  Negative for nausea and vomiting  Endocrine: Negative  Negative for cold intolerance and heat intolerance  Genitourinary: Negative  Negative for dysuria, frequency and urgency  Musculoskeletal: Negative  Negative for myalgias and neck pain  Skin: Negative  Negative for rash  Allergic/Immunologic: Negative  Neurological: Negative  Negative for dizziness, tremors, seizures, syncope, facial asymmetry, speech difficulty, weakness, light-headedness, numbness and headaches  Hematological: Negative  Does not bruise/bleed easily  Psychiatric/Behavioral: Negative  Negative for confusion, hallucinations and sleep disturbance  I personally reviewed the ROS that was entered by the medical assistant  *Please note this document was created using voice recognition software and may contain sound-alike word errors  *

## 2019-08-01 ENCOUNTER — OFFICE VISIT (OUTPATIENT)
Dept: FAMILY MEDICINE CLINIC | Facility: HOME HEALTHCARE | Age: 39
End: 2019-08-01
Payer: COMMERCIAL

## 2019-08-01 VITALS
SYSTOLIC BLOOD PRESSURE: 139 MMHG | WEIGHT: 212 LBS | BODY MASS INDEX: 30.35 KG/M2 | DIASTOLIC BLOOD PRESSURE: 91 MMHG | RESPIRATION RATE: 18 BRPM | HEIGHT: 70 IN

## 2019-08-01 DIAGNOSIS — F10.10 ALCOHOL ABUSE: ICD-10-CM

## 2019-08-01 DIAGNOSIS — Z02.83 ENCOUNTER FOR DRUG SCREENING: Primary | ICD-10-CM

## 2019-08-01 PROCEDURE — T1015 CLINIC SERVICE: HCPCS | Performed by: FAMILY MEDICINE

## 2019-08-01 PROCEDURE — 80307 DRUG TEST PRSMV CHEM ANLYZR: CPT | Performed by: NURSE PRACTITIONER

## 2019-08-01 NOTE — PROGRESS NOTES
[address]       NAME: Eb Juárez is a 37 y o  male  : 1980    MRN: [id number]  DATE: 2019  TIME: 2:12 PM    Assessment and Plan   Diagnoses and all orders for this visit:    Encounter for drug screening  -     Toxicology screen, urine    Alcohol abuse  -     Cancel: Ambulatory referral to Social Work; Future        No problem-specific Assessment & Plan notes found for this encounter  Patient Instructions       Substance abuse form completed from [employer]   follow up with neruology as scheduled    urine for tox screen ordered   encouraged AA meeting for continued soberity     Chief Complaint     Chief Complaint   Patient presents with    Follow-up     get substance abuse form filled out for [facility], pt states he hasnt had alcohol for 2 months         History of Present Illness       37year old male at office with concern of needing substance abuse form completed for [employer]  He stated he has been sober for the past 2 months and has had one seizure in that time 2019      Review of Systems   Review of Systems   Constitutional: Negative  HENT: Negative  Eyes: Negative  Respiratory: Negative  Cardiovascular: Negative  Gastrointestinal: Negative  Endocrine: Negative  Genitourinary: Negative  Musculoskeletal: Negative  Skin: Negative  Allergic/Immunologic: Negative  Neurological: Negative  Hematological: Negative  Psychiatric/Behavioral: Negative  All other systems reviewed and are negative          Current Medications       Current Outpatient Medications:     amLODIPine (NORVASC) 2 5 mg tablet, Take 1 tablet (2 5 mg total) by mouth daily for 90 days, Disp: 30 tablet, Rfl: 2    levETIRAcetam (KEPPRA) 500 mg tablet, Take 1 tablet (500 mg total) by mouth every 12 (twelve) hours, Disp: 60 tablet, Rfl: 5    metoprolol succinate (TOPROL-XL) 50 mg 24 hr tablet, Take 1 tablet (50 mg total) by mouth daily for 90 days, Disp: 30 tablet, Rfl: 2    Current Allergies     Allergies as of 08/01/2019 - Reviewed 07/30/2019   Allergen Reaction Noted    Because [nonoxynol 9]  02/26/2018            The following portions of the patient's history were reviewed and updated as appropriate: allergies, current medications, past family history, past medical history, past social history, past surgical history and problem list      Past Medical History:   Diagnosis Date    Alcohol abuse     Asthma     GERD (gastroesophageal reflux disease)     w/o esophagitis    Hx of echocardiogram 06/18/2018    negative for ischemia    Hypertension     Seizures ([address] )     Early spring / late winter of 2018 was last and only seizure       Past Surgical History:   Procedure Laterality Date    CARPAL TUNNEL RELEASE Bilateral     CYST REMOVAL         Family History   Problem Relation Age of Onset    Stroke Father     Heart attack Father     Coronary artery disease Family         Less than 60 yrs of age    Seizures Neg Hx          Medications have been verified  Objective   /91 (BP Location: Left arm, Patient Position: Sitting, Cuff Size: Large)   Resp 18   Ht 5' 10" (1 778 m)   Wt 96 2 kg (212 lb)   BMI 30 42 kg/m²        Physical Exam     Physical Exam   Constitutional: He is oriented to person, place, and time  Vital signs are normal  He appears well-developed  HENT:   Head: Normocephalic and atraumatic  Right Ear: External ear normal    Left Ear: External ear normal    Nose: Nose normal    Mouth/Throat: Oropharynx is clear and moist    Eyes: Pupils are equal, round, and reactive to light  Conjunctivae, EOM and lids are normal    Neck: Normal range of motion  Neck supple  Cardiovascular: Normal rate, regular rhythm, normal heart sounds and intact distal pulses  Pulmonary/Chest: Effort normal and breath sounds normal    Abdominal: Soft  Normal appearance and bowel sounds are normal    Musculoskeletal: Normal range of motion     Neurological: He is alert and oriented to person, place, and time  He has normal reflexes  Skin: Skin is warm, dry and intact  Psychiatric: He has a normal mood and affect  His speech is normal and behavior is normal  Judgment and thought content normal    Nursing note and vitals reviewed

## 2019-08-03 LAB
AMPHETAMINES UR QL SCN: NEGATIVE NG/ML
BARBITURATES UR QL SCN: NEGATIVE NG/ML
BENZODIAZ UR QL: NEGATIVE NG/ML
BZE UR QL: NEGATIVE NG/ML
CANNABINOIDS UR QL SCN: NEGATIVE NG/ML
METHADONE UR QL SCN: NEGATIVE NG/ML
OPIATES UR QL: NEGATIVE NG/ML
PCP UR QL: NEGATIVE NG/ML
PROPOXYPH UR QL SCN: NEGATIVE NG/ML

## 2019-08-21 ENCOUNTER — TELEPHONE (OUTPATIENT)
Dept: NEUROLOGY | Facility: CLINIC | Age: 39
End: 2019-08-21

## 2019-08-21 ENCOUNTER — OFFICE VISIT (OUTPATIENT)
Dept: FAMILY MEDICINE CLINIC | Facility: HOME HEALTHCARE | Age: 39
End: 2019-08-21
Payer: COMMERCIAL

## 2019-08-21 VITALS
BODY MASS INDEX: 29.78 KG/M2 | RESPIRATION RATE: 18 BRPM | WEIGHT: 208 LBS | DIASTOLIC BLOOD PRESSURE: 83 MMHG | TEMPERATURE: 98 F | HEIGHT: 70 IN | SYSTOLIC BLOOD PRESSURE: 125 MMHG | HEART RATE: 73 BPM | OXYGEN SATURATION: 98 %

## 2019-08-21 DIAGNOSIS — L81.9 DISCOLORATION OF SKIN OF LOWER LEG: ICD-10-CM

## 2019-08-21 DIAGNOSIS — L81.9 DISCOLORATION OF SKIN OF LOWER LEG: Primary | ICD-10-CM

## 2019-08-21 LAB
BASOPHILS # BLD AUTO: 0.1 THOUSANDS/ΜL (ref 0–0.1)
BASOPHILS NFR BLD AUTO: 2 % (ref 0–1)
EOSINOPHIL # BLD AUTO: 0.76 THOUSAND/ΜL (ref 0–0.61)
EOSINOPHIL NFR BLD AUTO: 12 % (ref 0–6)
ERYTHROCYTE [DISTWIDTH] IN BLOOD BY AUTOMATED COUNT: 12.4 % (ref 11.6–15.1)
HCT VFR BLD AUTO: 51.8 % (ref 36.5–49.3)
HGB BLD-MCNC: 17.4 G/DL (ref 12–17)
IMM GRANULOCYTES # BLD AUTO: 0.02 THOUSAND/UL (ref 0–0.2)
IMM GRANULOCYTES NFR BLD AUTO: 0 % (ref 0–2)
LYMPHOCYTES # BLD AUTO: 2.28 THOUSANDS/ΜL (ref 0.6–4.47)
LYMPHOCYTES NFR BLD AUTO: 37 % (ref 14–44)
MCH RBC QN AUTO: 31.9 PG (ref 26.8–34.3)
MCHC RBC AUTO-ENTMCNC: 33.6 G/DL (ref 31.4–37.4)
MCV RBC AUTO: 95 FL (ref 82–98)
MONOCYTES # BLD AUTO: 0.42 THOUSAND/ΜL (ref 0.17–1.22)
MONOCYTES NFR BLD AUTO: 7 % (ref 4–12)
NEUTROPHILS # BLD AUTO: 2.56 THOUSANDS/ΜL (ref 1.85–7.62)
NEUTS SEG NFR BLD AUTO: 42 % (ref 43–75)
NRBC BLD AUTO-RTO: 0 /100 WBCS
PLATELET # BLD AUTO: 240 THOUSANDS/UL (ref 149–390)
PMV BLD AUTO: 10 FL (ref 8.9–12.7)
RBC # BLD AUTO: 5.45 MILLION/UL (ref 3.88–5.62)
WBC # BLD AUTO: 6.14 THOUSAND/UL (ref 4.31–10.16)

## 2019-08-21 PROCEDURE — T1015 CLINIC SERVICE: HCPCS | Performed by: FAMILY MEDICINE

## 2019-08-21 PROCEDURE — 85025 COMPLETE CBC W/AUTO DIFF WBC: CPT | Performed by: NURSE PRACTITIONER

## 2019-08-21 NOTE — TELEPHONE ENCOUNTER
Sun Castano from patients PCP office called to inform office she was going to be faxing [employer] forms for patient to our office  Fax number provided

## 2019-08-21 NOTE — PROGRESS NOTES
[email or web address] [email or web address]        NAME: Janusz Baca is a 37 y o  male  : 1980    MRN: [id number]  DATE: 2019  TIME: 8:01 AM    Assessment and Plan   No primary diagnosis found  No diagnosis found  Patient Instructions        ordered vascular duplex study left leg for complaints discoloration that occurred leg daily   ordered CBC to evaluate white counts look for any infection  Proceed to  ER if symptoms worsen  Chief Complaint     Chief Complaint   Patient presents with    Follow-up     [employer] form needs to be filled out         History of Present Illness       53-year-old male presents to office to have [employer] [address] [employer] of transportation substance use form completed again this form was completed 2019 for patient patient has been alcohol free for the past 2-3 years  Patient states at times his left foot become discolored turning black and his left leg to about mid shin turns arch at times no with patient denies any change in temperature when this happens patient states this happens daily patient states this color change could last for hours  Review of Systems   Review of Systems   Constitutional: Negative  HENT: Negative  Eyes: Negative  Respiratory: Negative  Cardiovascular: Negative  Gastrointestinal: Negative  Endocrine: Negative  Genitourinary: Negative  Musculoskeletal: Negative  Skin: Positive for color change  Allergic/Immunologic: Negative  Neurological: Negative  Hematological: Negative  Psychiatric/Behavioral: Negative  All other systems reviewed and are negative          Current Medications       Current Outpatient Medications:     amLODIPine (NORVASC) 2 5 mg tablet, Take 1 tablet (2 5 mg total) by mouth daily for 90 days, Disp: 30 tablet, Rfl: 2    levETIRAcetam (KEPPRA) 500 mg tablet, Take 1 tablet (500 mg total) by mouth every 12 (twelve) hours, Disp: 60 tablet, Rfl: 5    metoprolol succinate (TOPROL-XL) 50 mg 24 hr tablet, Take 1 tablet (50 mg total) by mouth daily for 90 days, Disp: 30 tablet, Rfl: 2    Current Allergies     Allergies as of 08/21/2019 - Reviewed 08/21/2019   Allergen Reaction Noted    Because [nonoxynol 9]  02/26/2018            The following portions of the patient's history were reviewed and updated as appropriate: allergies, current medications, past family history, past medical history, past social history, past surgical history and problem list      Past Medical History:   Diagnosis Date    Alcohol abuse     Asthma     GERD (gastroesophageal reflux disease)     w/o esophagitis    Hx of echocardiogram 06/18/2018    negative for ischemia    Hypertension     Seizures ([address] )     Early spring / late winter of 2018 was last and only seizure       Past Surgical History:   Procedure Laterality Date    CARPAL TUNNEL RELEASE Bilateral     CYST REMOVAL         Family History   Problem Relation Age of Onset    Stroke Father     Heart attack Father     Coronary artery disease Family         Less than 60 yrs of age    Seizures Neg Hx          Medications have been verified  Objective   /83 (BP Location: Left arm, Patient Position: Sitting, Cuff Size: Large)   Pulse 73   Temp 98 °F (36 7 °C) (Temporal)   Resp 18   Ht 5' 10" (1 778 m)   Wt 94 3 kg (208 lb)   SpO2 98%   BMI 29 84 kg/m²        Physical Exam     Physical Exam   Constitutional: He is oriented to person, place, and time  Vital signs are normal  He appears well-developed  HENT:   Head: Normocephalic and atraumatic  Right Ear: External ear normal    Left Ear: External ear normal    Nose: Nose normal    Mouth/Throat: Oropharynx is clear and moist    Eyes: Pupils are equal, round, and reactive to light  Conjunctivae and EOM are normal  Lids are everted and swept, no foreign bodies found  Neck: Normal range of motion  Neck supple     Cardiovascular: Normal rate, regular rhythm, normal heart sounds and intact distal pulses  Pulmonary/Chest: Effort normal and breath sounds normal    Abdominal: Soft  Normal appearance and bowel sounds are normal    Musculoskeletal: Normal range of motion  Neurological: He is alert and oriented to person, place, and time  He has normal reflexes  Skin: Skin is warm, dry and intact  Capillary refill takes less than 2 seconds  Lesion noted  Has no discoloration noted to leg at this time temperatures within normal limits superficial scabbed area present to left shin patient states the scabbed area has   Psychiatric: He has a normal mood and affect  His speech is normal and behavior is normal  Judgment and thought content normal    Nursing note and vitals reviewed

## 2019-10-08 ENCOUNTER — OFFICE VISIT (OUTPATIENT)
Dept: NEUROLOGY | Facility: CLINIC | Age: 39
End: 2019-10-08

## 2019-10-08 VITALS
HEART RATE: 72 BPM | WEIGHT: 209.6 LBS | RESPIRATION RATE: 18 BRPM | SYSTOLIC BLOOD PRESSURE: 108 MMHG | DIASTOLIC BLOOD PRESSURE: 82 MMHG | HEIGHT: 70 IN | BODY MASS INDEX: 30.01 KG/M2

## 2019-10-08 DIAGNOSIS — R56.9 SEIZURES (HCC): Primary | ICD-10-CM

## 2019-10-08 PROCEDURE — 99213 OFFICE O/P EST LOW 20 MIN: CPT | Performed by: PSYCHIATRY & NEUROLOGY

## 2019-10-08 NOTE — ASSESSMENT & PLAN NOTE
First seizure event 021/06/2018  Second seizure 07/02/2019  Again, the 1st event felt likely secondary to alcohol withdrawal   However, the 2nd event, 1 occurring this past July did not appear to have any alcohol related issues  As result it was felt appropriate to initiate coverage with an antiepileptic medication  He has been on levetiracetam 500 mg twice daily  He is tolerating with no reported adverse side effects and as importantly has had no recurrent events  --continue for now levetiracetam 500 mg tablets taking 1 tablet twice daily  --will be checking a levetiracetam level with the thought perhaps of having him on a slightly higher levetiracetam schedule pending that level  --reorder his CMP which unfortunately was not drawn when he had his CBC performed  --forms have already been appropriately submitted to the [employer] of transportation and patient relates that he has complied by not operating a motor vehicle

## 2019-10-08 NOTE — LETTER
October 8, 2019     Maurice Hernández, [address] [place] [phone]    Patient: Rosales Marlow   YOB: 1980   Date of Visit: 10/8/2019       Dear Dr Francisco Monson: Thank you for referring [facility] to me for evaluation  Below are my notes for this consultation  If you have questions, please do not hesitate to call me  I look forward to following your patient along with you  Sincerely,        Breana Clarke MD        CC: No Recipients  Breana Clarke MD  10/8/2019 11:01 AM  Sign at close encounter  Patient ID: Rosales Marlow is a 37 y o  male  Assessment/Plan:    Seizures ([facility] [address] )  First seizure event 021/06/2018  Second seizure 07/02/2019  Again, the 1st event felt likely secondary to alcohol withdrawal   However, the 2nd event, 1 occurring this past July did not appear to have any alcohol related issues  As result it was felt appropriate to initiate coverage with an antiepileptic medication  He has been on levetiracetam 500 mg twice daily  He is tolerating with no reported adverse side effects and as importantly has had no recurrent events  --continue for now levetiracetam 500 mg tablets taking 1 tablet twice daily  --will be checking a levetiracetam level with the thought perhaps of having him on a slightly higher levetiracetam schedule pending that level  --reorder his CMP which unfortunately was not drawn when he had his CBC performed  --forms have already been appropriately submitted to the   [facility] of transportation and patient relates that he has complied by not operating a motor vehicle  He will follow up in 3 months or p r n     Subjective:    HPI  Patient, age 37 years, is being followed for his historical seizures and now to reassess his status on an antiepileptic medication  First episode occurred in February of 2018 was thought to be provoked by alcohol    His more recent event which occurred on July 2nd of this year did not appear to have any alcohol relationship  As result, he was started on levetiracetam and has remained on 500 mg twice daily  He reports no symptoms that would suggest any adverse side effects nor has he had any further episodes  His workup has included unremarkable brain MRI along with a normal sleep-deprived EEG although full sleep was not achieved during the study      Past Medical History:   Diagnosis Date    Alcohol abuse     Asthma     GERD (gastroesophageal reflux disease)     w/o esophagitis    Hx of echocardiogram 06/18/2018    negative for ischemia    Hypertension     Seizures ([address] )     Early spring / late winter of 2018 was last and only seizure     Past Surgical History:   Procedure Laterality Date    CARPAL TUNNEL RELEASE Bilateral     CYST REMOVAL       Social History     Socioeconomic History    Marital status: /Civil Union     Spouse name: None    Number of children: None    Years of education: None    Highest education level: None   Occupational History    None   Social Needs    Financial resource strain: None    Food insecurity:     Worry: None     Inability: None    Transportation needs:     Medical: None     Non-medical: None   Tobacco Use    Smoking status: Current Every Day Smoker     Packs/day: 1 50     Types: Cigarettes    Smokeless tobacco: Never Used   Substance and Sexual Activity    Alcohol use: Yes     Comment: social    Drug use: No    Sexual activity: None   Lifestyle    Physical activity:     Days per week: None     Minutes per session: None    Stress: None   Relationships    Social connections:     Talks on phone: None     Gets together: None     Attends Congregation service: None     Active member of club or organization: None     Attends meetings of clubs or organizations: None     Relationship status: None    Intimate partner violence:     Fear of current or ex partner: None     Emotionally abused: None     Physically abused: None Forced sexual activity: None   Other Topics Concern    None   Social History Narrative    Lives with in [place], with wife, four kids     Family History   Problem Relation Age of Onset    Stroke Father     Heart attack Father     Coronary artery disease Family         Less than 60 yrs of age   Julieann Frankel Seizures Neg Hx      Allergies   Allergen Reactions    Nonoxynol 9        Current Outpatient Medications:     amLODIPine (NORVASC) 2 5 mg tablet, Take 1 tablet (2 5 mg total) by mouth daily for 90 days, Disp: 30 tablet, Rfl: 2    levETIRAcetam (KEPPRA) 500 mg tablet, Take 1 tablet (500 mg total) by mouth every 12 (twelve) hours, Disp: 60 tablet, Rfl: 5    metoprolol succinate (TOPROL-XL) 50 mg 24 hr tablet, Take 1 tablet (50 mg total) by mouth daily for 90 days, Disp: 30 tablet, Rfl: 2    Objective:    Blood pressure 108/82, pulse 72, resp  rate 18, height 5' 10" (1 778 m), weight 95 1 kg (209 lb 9 6 oz)  Physical Exam  Head normocephalic  Eyes nonicteric  No audible anterior neck bruits  Lungs clear to auscultation  Rhythm regular  GI (abdomen) soft nontender  Bowel sounds present  No significant lower extremity edema  Neurological Exam  Alert  Appropriately conversational   Fully oriented  No dysarthria  Spontaneous gait unremarkable  Cranial nerves 2-12 tested and grossly intact  Funduscopic examination with marginated discs  Accurate with finger-to-nose bilaterally  Full symmetrical strength throughout the 4 extremities  No upper extremity drift  Muscle stretch reflexes bilaterally 1 throughout the upper extremities and bilaterally 1+ at the knees and ankles  Toe response downgoing bilaterally  ROS:    Review of Systems   Constitutional: Negative  Negative for appetite change and fever  HENT: Negative  Negative for hearing loss, tinnitus, trouble swallowing and voice change  Eyes: Negative  Negative for photophobia and pain  Respiratory: Negative    Negative for shortness of breath  Cardiovascular: Negative  Negative for palpitations  Gastrointestinal: Negative  Negative for nausea and vomiting  Endocrine: Negative  Negative for cold intolerance and heat intolerance  Genitourinary: Negative  Negative for dysuria, frequency and urgency  Musculoskeletal: Negative  Negative for myalgias and neck pain  Skin: Negative  Negative for rash  Neurological: Negative  Negative for dizziness, tremors, seizures, syncope, facial asymmetry, speech difficulty, weakness, light-headedness, numbness and headaches  Hematological: Negative  Does not bruise/bleed easily  Psychiatric/Behavioral: Negative  Negative for confusion, hallucinations and sleep disturbance  I personally reviewed the ROS that was entered by the medical assistant  *Please note this document was created using voice recognition software and may contain sound-alike word errors  *

## 2019-10-08 NOTE — PROGRESS NOTES
Patient ID: Samuel Mccormack is a 37 y o  male  Assessment/Plan:    Seizures ([facility] [address] )  First seizure event 021/06/2018  Second seizure 07/02/2019  Again, the 1st event felt likely secondary to alcohol withdrawal   However, the 2nd event, 1 occurring this past July did not appear to have any alcohol related issues  As result it was felt appropriate to initiate coverage with an antiepileptic medication  He has been on levetiracetam 500 mg twice daily  He is tolerating with no reported adverse side effects and as importantly has had no recurrent events  --continue for now levetiracetam 500 mg tablets taking 1 tablet twice daily  --will be checking a levetiracetam level with the thought perhaps of having him on a slightly higher levetiracetam schedule pending that level  --reorder his CMP which unfortunately was not drawn when he had his CBC performed  --forms have already been appropriately submitted to the   [facility] of transportation and patient relates that he has complied by not operating a motor vehicle  He will follow up in 3 months or p r n     Subjective:    HPI  Patient, age 37 years, is being followed for his historical seizures and now to reassess his status on an antiepileptic medication  First episode occurred in February of 2018 was thought to be provoked by alcohol  His more recent event which occurred on July 2nd of this year did not appear to have any alcohol relationship  As result, he was started on levetiracetam and has remained on 500 mg twice daily  He reports no symptoms that would suggest any adverse side effects nor has he had any further episodes  His workup has included unremarkable brain MRI along with a normal sleep-deprived EEG although full sleep was not achieved during the study      Past Medical History:   Diagnosis Date    Alcohol abuse     Asthma     GERD (gastroesophageal reflux disease)     w/o esophagitis    Hx of echocardiogram 06/18/2018 negative for ischemia    Hypertension     Seizures ([facility] [address] )     Early spring / late winter of 2018 was last and only seizure     Past Surgical History:   Procedure Laterality Date    CARPAL TUNNEL RELEASE Bilateral     CYST REMOVAL       Social History     Socioeconomic History    Marital status: /Civil Union     Spouse name: None    Number of children: None    Years of education: None    Highest education level: None   Occupational History    None   Social Needs    Financial resource strain: None    Food insecurity:     Worry: None     Inability: None    Transportation needs:     Medical: None     Non-medical: None   Tobacco Use    Smoking status: Current Every Day Smoker     Packs/day: 1 50     Types: Cigarettes    Smokeless tobacco: Never Used   Substance and Sexual Activity    Alcohol use: Yes     Comment: social    Drug use: No    Sexual activity: None   Lifestyle    Physical activity:     Days per week: None     Minutes per session: None    Stress: None   Relationships    Social connections:     Talks on phone: None     Gets together: None     Attends Voodoo service: None     Active member of club or organization: None     Attends meetings of clubs or organizations: None     Relationship status: None    Intimate partner violence:     Fear of current or ex partner: None     Emotionally abused: None     Physically abused: None     Forced sexual activity: None   Other Topics Concern    None   Social History Narrative    Lives with in [place], with wife, four kids     Family History   Problem Relation Age of Onset    Stroke Father     Heart attack Father     Coronary artery disease Family         Less than 60 yrs of age    Seizures Neg Hx      Allergies   Allergen Reactions    Nonoxynol 9        Current Outpatient Medications:     amLODIPine (NORVASC) 2 5 mg tablet, Take 1 tablet (2 5 mg total) by mouth daily for 90 days, Disp: 30 tablet, Rfl: 2    levETIRAcetam (KEPPRA) 500 mg tablet, Take 1 tablet (500 mg total) by mouth every 12 (twelve) hours, Disp: 60 tablet, Rfl: 5    metoprolol succinate (TOPROL-XL) 50 mg 24 hr tablet, Take 1 tablet (50 mg total) by mouth daily for 90 days, Disp: 30 tablet, Rfl: 2    Objective:    Blood pressure 108/82, pulse 72, resp  rate 18, height 5' 10" (1 778 m), weight 95 1 kg (209 lb 9 6 oz)  Physical Exam  Head normocephalic  Eyes nonicteric  No audible anterior neck bruits  Lungs clear to auscultation  Rhythm regular  GI (abdomen) soft nontender  Bowel sounds present  No significant lower extremity edema  Neurological Exam  Alert  Appropriately conversational   Fully oriented  No dysarthria  Spontaneous gait unremarkable  Cranial nerves 2-12 tested and grossly intact  Funduscopic examination with marginated discs  Accurate with finger-to-nose bilaterally  Full symmetrical strength throughout the 4 extremities  No upper extremity drift  Muscle stretch reflexes bilaterally 1 throughout the upper extremities and bilaterally 1+ at the knees and ankles  Toe response downgoing bilaterally  ROS:    Review of Systems   Constitutional: Negative  Negative for appetite change and fever  HENT: Negative  Negative for hearing loss, tinnitus, trouble swallowing and voice change  Eyes: Negative  Negative for photophobia and pain  Respiratory: Negative  Negative for shortness of breath  Cardiovascular: Negative  Negative for palpitations  Gastrointestinal: Negative  Negative for nausea and vomiting  Endocrine: Negative  Negative for cold intolerance and heat intolerance  Genitourinary: Negative  Negative for dysuria, frequency and urgency  Musculoskeletal: Negative  Negative for myalgias and neck pain  Skin: Negative  Negative for rash  Neurological: Negative  Negative for dizziness, tremors, seizures, syncope, facial asymmetry, speech difficulty, weakness, light-headedness, numbness and headaches  Hematological: Negative  Does not bruise/bleed easily  Psychiatric/Behavioral: Negative  Negative for confusion, hallucinations and sleep disturbance  I personally reviewed the ROS that was entered by the medical assistant  *Please note this document was created using voice recognition software and may contain sound-alike word errors  *

## 2019-10-21 DIAGNOSIS — I10 HTN (HYPERTENSION): ICD-10-CM

## 2019-10-21 RX ORDER — METOPROLOL SUCCINATE 50 MG/1
50 TABLET, EXTENDED RELEASE ORAL DAILY
Qty: 90 TABLET | Refills: 0 | Status: SHIPPED | OUTPATIENT
Start: 2019-10-21 | End: 2020-03-11 | Stop reason: SDUPTHER

## 2019-10-21 RX ORDER — AMLODIPINE BESYLATE 2.5 MG/1
2.5 TABLET ORAL DAILY
Qty: 90 TABLET | Refills: 0 | Status: SHIPPED | OUTPATIENT
Start: 2019-10-21 | End: 2020-03-12

## 2019-11-20 ENCOUNTER — APPOINTMENT (EMERGENCY)
Dept: RADIOLOGY | Facility: HOSPITAL | Age: 39
End: 2019-11-20

## 2019-11-20 ENCOUNTER — APPOINTMENT (EMERGENCY)
Dept: NON INVASIVE DIAGNOSTICS | Facility: HOSPITAL | Age: 39
End: 2019-11-20

## 2019-11-20 ENCOUNTER — HOSPITAL ENCOUNTER (EMERGENCY)
Facility: HOSPITAL | Age: 39
Discharge: HOME | End: 2019-11-20
Attending: EMERGENCY MEDICINE | Admitting: EMERGENCY MEDICINE

## 2019-11-20 VITALS
RESPIRATION RATE: 16 BRPM | HEIGHT: 70 IN | OXYGEN SATURATION: 96 % | BODY MASS INDEX: 31.4 KG/M2 | DIASTOLIC BLOOD PRESSURE: 75 MMHG | WEIGHT: 219.36 LBS | SYSTOLIC BLOOD PRESSURE: 133 MMHG | HEART RATE: 77 BPM | TEMPERATURE: 99.4 F

## 2019-11-20 DIAGNOSIS — L03.90 CELLULITIS: ICD-10-CM

## 2019-11-20 DIAGNOSIS — S81.802A LEG WOUND, LEFT, INITIAL ENCOUNTER: Primary | ICD-10-CM

## 2019-11-20 LAB
ALBUMIN SERPL BCP-MCNC: 3.5 G/DL (ref 3.5–5)
ALP SERPL-CCNC: 92 U/L (ref 46–116)
ALT SERPL W P-5'-P-CCNC: 26 U/L (ref 12–78)
ANION GAP SERPL CALCULATED.3IONS-SCNC: 11 MMOL/L (ref 4–13)
AST SERPL W P-5'-P-CCNC: 18 U/L (ref 5–45)
BASOPHILS # BLD AUTO: 0.11 THOUSANDS/ΜL (ref 0–0.1)
BASOPHILS NFR BLD AUTO: 1 % (ref 0–1)
BILIRUB SERPL-MCNC: 0.3 MG/DL (ref 0.2–1)
BUN SERPL-MCNC: 13 MG/DL (ref 5–25)
CALCIUM SERPL-MCNC: 9 MG/DL (ref 8.3–10.1)
CHLORIDE SERPL-SCNC: 100 MMOL/L (ref 100–108)
CO2 SERPL-SCNC: 26 MMOL/L (ref 21–32)
CREAT SERPL-MCNC: 0.97 MG/DL (ref 0.6–1.3)
EOSINOPHIL # BLD AUTO: 0.62 THOUSAND/ΜL (ref 0–0.61)
EOSINOPHIL NFR BLD AUTO: 6 % (ref 0–6)
ERYTHROCYTE [DISTWIDTH] IN BLOOD BY AUTOMATED COUNT: 12.2 % (ref 11.6–15.1)
GFR SERPL CREATININE-BSD FRML MDRD: 97 ML/MIN/1.73SQ M
GLUCOSE SERPL-MCNC: 134 MG/DL (ref 65–140)
HCT VFR BLD AUTO: 46.5 % (ref 36.5–49.3)
HGB BLD-MCNC: 15.5 G/DL (ref 12–17)
IMM GRANULOCYTES # BLD AUTO: 0.02 THOUSAND/UL (ref 0–0.2)
IMM GRANULOCYTES NFR BLD AUTO: 0 % (ref 0–2)
LACTATE SERPL-SCNC: 1.6 MMOL/L (ref 0.5–2)
LYMPHOCYTES # BLD AUTO: 2.19 THOUSANDS/ΜL (ref 0.6–4.47)
LYMPHOCYTES NFR BLD AUTO: 23 % (ref 14–44)
MCH RBC QN AUTO: 32.3 PG (ref 26.8–34.3)
MCHC RBC AUTO-ENTMCNC: 33.3 G/DL (ref 31.4–37.4)
MCV RBC AUTO: 97 FL (ref 82–98)
MONOCYTES # BLD AUTO: 0.7 THOUSAND/ΜL (ref 0.17–1.22)
MONOCYTES NFR BLD AUTO: 7 % (ref 4–12)
NEUTROPHILS # BLD AUTO: 6 THOUSANDS/ΜL (ref 1.85–7.62)
NEUTS SEG NFR BLD AUTO: 63 % (ref 43–75)
NRBC BLD AUTO-RTO: 0 /100 WBCS
PLATELET # BLD AUTO: 241 THOUSANDS/UL (ref 149–390)
PMV BLD AUTO: 9.2 FL (ref 8.9–12.7)
POTASSIUM SERPL-SCNC: 3.7 MMOL/L (ref 3.5–5.3)
PROT SERPL-MCNC: 7.6 G/DL (ref 6.4–8.2)
RBC # BLD AUTO: 4.8 MILLION/UL (ref 3.88–5.62)
SODIUM SERPL-SCNC: 137 MMOL/L (ref 136–145)
WBC # BLD AUTO: 9.64 THOUSAND/UL (ref 4.31–10.16)

## 2019-11-20 PROCEDURE — 73590 X-RAY EXAM OF LOWER LEG: CPT

## 2019-11-20 PROCEDURE — 90715 TDAP VACCINE 7 YRS/> IM: CPT | Performed by: EMERGENCY MEDICINE

## 2019-11-20 PROCEDURE — 83605 ASSAY OF LACTIC ACID: CPT | Performed by: EMERGENCY MEDICINE

## 2019-11-20 PROCEDURE — 87040 BLOOD CULTURE FOR BACTERIA: CPT | Performed by: EMERGENCY MEDICINE

## 2019-11-20 PROCEDURE — 87070 CULTURE OTHR SPECIMN AEROBIC: CPT | Performed by: EMERGENCY MEDICINE

## 2019-11-20 PROCEDURE — 87186 SC STD MICRODIL/AGAR DIL: CPT | Performed by: EMERGENCY MEDICINE

## 2019-11-20 PROCEDURE — 90471 IMMUNIZATION ADMIN: CPT

## 2019-11-20 PROCEDURE — 99284 EMERGENCY DEPT VISIT MOD MDM: CPT | Performed by: EMERGENCY MEDICINE

## 2019-11-20 PROCEDURE — 93971 EXTREMITY STUDY: CPT

## 2019-11-20 PROCEDURE — 96366 THER/PROPH/DIAG IV INF ADDON: CPT

## 2019-11-20 PROCEDURE — 96365 THER/PROPH/DIAG IV INF INIT: CPT

## 2019-11-20 PROCEDURE — 99284 EMERGENCY DEPT VISIT MOD MDM: CPT

## 2019-11-20 PROCEDURE — 36415 COLL VENOUS BLD VENIPUNCTURE: CPT | Performed by: EMERGENCY MEDICINE

## 2019-11-20 PROCEDURE — 87205 SMEAR GRAM STAIN: CPT | Performed by: EMERGENCY MEDICINE

## 2019-11-20 PROCEDURE — 85025 COMPLETE CBC W/AUTO DIFF WBC: CPT | Performed by: EMERGENCY MEDICINE

## 2019-11-20 PROCEDURE — 80053 COMPREHEN METABOLIC PANEL: CPT | Performed by: EMERGENCY MEDICINE

## 2019-11-20 RX ORDER — IBUPROFEN 600 MG/1
600 TABLET ORAL ONCE
Status: COMPLETED | OUTPATIENT
Start: 2019-11-20 | End: 2019-11-20

## 2019-11-20 RX ORDER — CLINDAMYCIN HYDROCHLORIDE 150 MG/1
450 CAPSULE ORAL ONCE
Status: COMPLETED | OUTPATIENT
Start: 2019-11-20 | End: 2019-11-20

## 2019-11-20 RX ORDER — CLINDAMYCIN HYDROCHLORIDE 150 MG/1
450 CAPSULE ORAL EVERY 8 HOURS SCHEDULED
Qty: 90 CAPSULE | Refills: 0 | Status: SHIPPED | OUTPATIENT
Start: 2019-11-20 | End: 2019-11-30

## 2019-11-20 RX ORDER — SACCHAROMYCES BOULARDII 250 MG
250 CAPSULE ORAL 2 TIMES DAILY
Qty: 30 CAPSULE | Refills: 0 | Status: SHIPPED | OUTPATIENT
Start: 2019-11-20 | End: 2020-03-16 | Stop reason: ALTCHOICE

## 2019-11-20 RX ADMIN — TETANUS TOXOID, REDUCED DIPHTHERIA TOXOID AND ACELLULAR PERTUSSIS VACCINE, ADSORBED 0.5 ML: 5; 2.5; 8; 8; 2.5 SUSPENSION INTRAMUSCULAR at 18:40

## 2019-11-20 RX ADMIN — VANCOMYCIN HYDROCHLORIDE 1500 MG: 1 INJECTION, POWDER, LYOPHILIZED, FOR SOLUTION INTRAVENOUS at 18:25

## 2019-11-20 RX ADMIN — CLINDAMYCIN HYDROCHLORIDE 450 MG: 150 CAPSULE ORAL at 19:25

## 2019-11-20 RX ADMIN — IBUPROFEN 600 MG: 600 TABLET ORAL at 20:12

## 2019-11-20 NOTE — ED PROVIDER NOTES
History  Chief Complaint   Patient presents with    Wound Check     patient has circular wound to anterior left leg that has been present for the past two months  it is red and slightly tender to the touch  patient reports yellowish organge discharge  47-year-old male presents with 2 month history of a left shin wound  He denies any trauma  He states that 1 day after getting out of the shower the wound bubbled up and opened he denies any drainage from the area he has been locally applying Neosporin to the wound he denies prior history of MRSA today's having more of a prickly sensation to the area the redness around the wound has been slowly getting larger he denies any fever chills he denies prior history of DVT or PE he has had no pleuritic pain no chest pain or short shortness of breath no history mailaise lays he is currently hungry he denies being a diabetic          Prior to Admission Medications   Prescriptions Last Dose Informant Patient Reported? Taking?    amLODIPine (NORVASC) 2 5 mg tablet   No Yes   Sig: Take 1 tablet (2 5 mg total) by mouth daily   levETIRAcetam (KEPPRA) 500 mg tablet   No Yes   Sig: Take 1 tablet (500 mg total) by mouth every 12 (twelve) hours   metoprolol succinate (TOPROL-XL) 50 mg 24 hr tablet   No Yes   Sig: Take 1 tablet (50 mg total) by mouth daily      Facility-Administered Medications: None       Past Medical History:   Diagnosis Date    Alcohol abuse     Asthma     GERD (gastroesophageal reflux disease)     w/o esophagitis    Hx of echocardiogram 06/18/2018    negative for ischemia    Hypertension     Seizures ([address] )     Early spring / late winter of 2018 was last and only seizure       Past Surgical History:   Procedure Laterality Date    CARPAL TUNNEL RELEASE Bilateral     CYST REMOVAL         Family History   Problem Relation Age of Onset    Stroke Father     Heart attack Father     Coronary artery disease Family         Less than 61 yrs of age   [place] Seizures Neg Hx      I have reviewed and agree with the history as documented  Social History     Tobacco Use    Smoking status: Current Every Day Smoker     Packs/day: 2 00     Types: Cigarettes    Smokeless tobacco: Never Used   Substance Use Topics    Alcohol use: Yes     Comment: social    Drug use: No        Review of Systems   Constitutional: Negative for activity change, appetite change, chills and fever  HENT: Negative for congestion, sinus pressure and sinus pain  Respiratory: Negative for shortness of breath  Cardiovascular: Positive for leg swelling (left)  Negative for chest pain  Gastrointestinal: Negative for abdominal pain, diarrhea, nausea and vomiting  Genitourinary: Negative for difficulty urinating  Musculoskeletal: Negative for arthralgias, back pain, gait problem and myalgias  Skin: Positive for rash and wound  Neurological: Positive for numbness (prickly sensation to leg)  Negative for speech difficulty and weakness  Psychiatric/Behavioral: Negative for confusion  Physical Exam  Physical Exam   Constitutional: He appears well-developed and well-nourished  No distress  HENT:   Head: Normocephalic and atraumatic  Right Ear: External ear normal    Left Ear: External ear normal    Nose: Nose normal    Mouth/Throat: Oropharynx is clear and moist  No oropharyngeal exudate  Eyes: Pupils are equal, round, and reactive to light  Conjunctivae and EOM are normal  Right eye exhibits no discharge  Left eye exhibits no discharge  Neck: Normal range of motion  Neck supple  Musculoskeletal:   No inguinal LAD; left shin 2cm chronic appearing wound with satellite wound superiorly; see media tab  He has surrounding cellulitis to the area as well as nonpitting edema to the leg  No wounds to the feet bilaterally range of motion of the ankle is intact and does not elicit increasing pain there is no clinical evidence of compartment syndrome     Lymphadenopathy:     He has no cervical adenopathy  Skin: He is not diaphoretic  Vitals reviewed  Vital Signs  ED Triage Vitals   Temperature Pulse Respirations Blood Pressure SpO2   11/20/19 1732 11/20/19 1732 11/20/19 1732 11/20/19 1745 11/20/19 1732   100 1 °F (37 8 °C) (!) 53 18 144/91 97 %      Temp Source Heart Rate Source Patient Position - Orthostatic VS BP Location FiO2 (%)   11/20/19 1732 11/20/19 1732 11/20/19 1732 11/20/19 1732 --   Temporal Monitor Sitting Left arm       Pain Score       11/20/19 1732       1           Vitals:    11/20/19 1745 11/20/19 1856 11/20/19 1900 11/20/19 2015   BP: 144/91 146/65 146/65 133/75   Pulse: 63 58 62 77   Patient Position - Orthostatic VS: Lying Sitting Lying          Visual Acuity      ED Medications  Medications   tetanus-diphtheria-acellular pertussis (BOOSTRIX) IM injection 0 5 mL (0 5 mL Intramuscular Given 11/20/19 1840)   vancomycin (VANCOCIN) 1,500 mg in sodium chloride 0 9 % 500 mL IVPB (0 mg/kg × 99 5 kg Intravenous Stopped 11/20/19 2025)   clindamycin (CLEOCIN) capsule 450 mg (450 mg Oral Given 11/20/19 1925)   ibuprofen (MOTRIN) tablet 600 mg (600 mg Oral Given 11/20/19 2012)       Diagnostic Studies  Results Reviewed     Procedure Component Value Units Date/Time    Blood culture #1 [[id number]] Collected:  11/20/19 1811    Lab Status:  Preliminary result Specimen:  Blood from Hand, Left Updated:  11/21/19 0002     Blood Culture Received in Microbiology Lab  Culture in Progress  Blood culture #2 [[id number]] Collected:  11/20/19 1810    Lab Status:  Preliminary result Specimen:  Blood from Arm, Right Updated:  11/21/19 0002     Blood Culture Received in Microbiology Lab  Culture in Progress  Lactic acid, plasma [[id number]]  (Normal) Collected:  11/20/19 1810    Lab Status:  Final result Specimen:  Blood from Arm, Left Updated:  11/20/19 1834     LACTIC ACID 1 6 mmol/L     Narrative:       Result may be elevated if tourniquet was used during collection      Wound culture and Gram stain [[id number]] Collected:  11/20/19 1828    Lab Status:   In process Specimen:  Wound from Leg, Left Updated:  11/20/19 1832    Comprehensive metabolic panel [[id number]] Collected:  11/20/19 1811    Lab Status:  Final result Specimen:  Blood from Arm, Left Updated:  11/20/19 1831     Sodium 137 mmol/L      Potassium 3 7 mmol/L      Chloride 100 mmol/L      CO2 26 mmol/L      ANION GAP 11 mmol/L      BUN 13 mg/dL      Creatinine 0 97 mg/dL      Glucose 134 mg/dL      Calcium 9 0 mg/dL      AST 18 U/L      ALT 26 U/L      Alkaline Phosphatase 92 U/L      Total Protein 7 6 g/dL      Albumin 3 5 g/dL      Total Bilirubin 0 30 mg/dL      eGFR 97 ml/min/1 73sq m     Narrative:       National Kidney Disease Foundation guidelines for Chronic Kidney Disease (CKD):     Stage 1 with normal or high GFR (GFR > 90 mL/min/1 73 square meters)    Stage 2 Mild CKD (GFR = 60-89 mL/min/1 73 square meters)    Stage 3A Moderate CKD (GFR = 45-59 mL/min/1 73 square meters)    Stage 3B Moderate CKD (GFR = 30-44 mL/min/1 73 square meters)    Stage 4 Severe CKD (GFR = 15-29 mL/min/1 73 square meters)    Stage 5 End Stage CKD (GFR <15 mL/min/1 73 square meters)  Note: GFR calculation is accurate only with a steady state creatinine    CBC and differential [[id number]]  (Abnormal) Collected:  11/20/19 1810    Lab Status:  Final result Specimen:  Blood from Arm, Left Updated:  11/20/19 1815     WBC 9 64 Thousand/uL      RBC 4 80 Million/uL      Hemoglobin 15 5 g/dL      Hematocrit 46 5 %      MCV 97 fL      MCH 32 3 pg      MCHC 33 3 g/dL      RDW 12 2 %      MPV 9 2 fL      Platelets 055 Thousands/uL      nRBC 0 /100 WBCs      Neutrophils Relative 63 %      Immat GRANS % 0 %      Lymphocytes Relative 23 %      Monocytes Relative 7 %      Eosinophils Relative 6 %      Basophils Relative 1 %      Neutrophils Absolute 6 00 Thousands/µL      Immature Grans Absolute 0 02 Thousand/uL      Lymphocytes Absolute 2 19 Thousands/µL Monocytes Absolute 0 70 Thousand/µL      Eosinophils Absolute 0 62 Thousand/µL      Basophils Absolute 0 11 Thousands/µL                  XR tibia fibula 2 views LEFT   ED Interpretation by Brandon Fong MD (11/20 1919)   Read by me; Radiologist to provide formal interpretation  Soft tissue swelling and defect with gas at wound; no osseous abnormality; no radiopaque fb      VAS lower limb venous duplex study, unilateral/limited    (Results Pending)              Procedures  Procedures       ED Course  ED Course as of Nov 21 0248 Wed Nov 20, 2019   1919 Prelim venous doppler u/s negative for DVT LLE                                  MDM  Number of Diagnoses or Management Options  Diagnosis management comments: Mdm:  Chronic nonhealing wounds to the left lower extremity with surrounding cellulitis and evidence of leg edema  Will initiate an IV dose of vancomycin here assess for evidence of sepsis gas in the soft tissues and if laboratory evaluation is unremarkable will place patient on oral antibiotics and referred to the [facility]  Luis  Disposition  Final diagnoses:   Leg wound, left, initial encounter   Cellulitis     Time reflects when diagnosis was documented in both MDM as applicable and the Disposition within this note     Time User Action Codes Description Comment    11[facility]20/2019  7:21  [facility], initial [facility]     11/20/2019  7:22 PM Gomez Ding Add [H15 [id number]] Cellulitis       ED Disposition     ED Disposition Condition Date/Time Comment    Discharge Stable Wed Nov 20, 2019  7:24 PM Burley Lesches discharge to home/self care              Follow-up Information     Follow up With Specialties Details Why Contact Info Additional Information    [address] [facility] [place] Call in 1 day call to schedule appt double check address [address] [place] [id number] [phone] [facility], [address], [place], [address]          Discharge Medication List as of 11/20/2019  7:31 PM      START taking these medications    Details   clindamycin (CLEOCIN) 150 mg capsule Take 3 capsules (450 mg total) by mouth every 8 (eight) hours for 10 days, Starting Wed 11/20/2019, Until Sat 11/30/2019, Print      saccharomyces boulardii (FLORASTOR) 250 mg capsule Take 1 capsule (250 mg total) by mouth 2 (two) times a day, Starting Wed 11/20/2019, Print         CONTINUE these medications which have NOT CHANGED    Details   amLODIPine (NORVASC) 2 5 mg tablet Take 1 tablet (2 5 mg total) by mouth daily, Starting Mon 10/21/2019, Until Sun 1/19/2020, Normal      levETIRAcetam (KEPPRA) 500 mg tablet Take 1 tablet (500 mg total) by mouth every 12 (twelve) hours, Starting Tue 7/30/2019, Normal      metoprolol succinate (TOPROL-XL) 50 mg 24 hr tablet Take 1 tablet (50 mg total) by mouth daily, Starting Mon 10/21/2019, Until Sun 1/19/2020, Normal               ED Provider  Electronically Signed by           Dulce Maria Wright MD  11/21/19 0526

## 2019-11-21 PROCEDURE — 93971 EXTREMITY STUDY: CPT | Performed by: SURGERY

## 2019-11-21 NOTE — DISCHARGE INSTRUCTIONS
Wet to dry dressing until recheck with wound care  Finish all antibiotics  Running water OK no soaking of wound  Eat yogurt, take pro-biotic over the counter, or acidophilus tablet (in vitamin aisle) or fill rx to prevent diarrhea

## 2019-11-23 DIAGNOSIS — T14.8XXA WOUND INFECTION: Primary | ICD-10-CM

## 2019-11-23 DIAGNOSIS — L08.9 WOUND INFECTION: Primary | ICD-10-CM

## 2019-11-23 LAB
BACTERIA WND AEROBE CULT: ABNORMAL
BACTERIA WND AEROBE CULT: ABNORMAL
GRAM STN SPEC: ABNORMAL

## 2019-11-23 RX ORDER — CIPROFLOXACIN 500 MG/1
500 TABLET, FILM COATED ORAL 2 TIMES DAILY
Qty: 14 TABLET | Refills: 0 | Status: SHIPPED | OUTPATIENT
Start: 2019-11-23 | End: 2019-11-30

## 2019-11-25 LAB
BACTERIA BLD CULT: NORMAL
BACTERIA BLD CULT: NORMAL

## 2019-12-02 ENCOUNTER — APPOINTMENT (OUTPATIENT)
Dept: WOUND CARE | Facility: CLINIC | Age: 39
End: 2019-12-02

## 2019-12-02 PROCEDURE — 11042 DBRDMT SUBQ TIS 1ST 20SQCM/<: CPT | Performed by: REGISTERED NURSE

## 2019-12-02 PROCEDURE — 99213 OFFICE O/P EST LOW 20 MIN: CPT | Performed by: REGISTERED NURSE

## 2019-12-09 ENCOUNTER — APPOINTMENT (OUTPATIENT)
Dept: WOUND CARE | Facility: CLINIC | Age: 39
End: 2019-12-09

## 2019-12-09 PROCEDURE — 97597 DBRDMT OPN WND 1ST 20 CM/<: CPT

## 2019-12-16 ENCOUNTER — APPOINTMENT (OUTPATIENT)
Dept: WOUND CARE | Facility: CLINIC | Age: 39
End: 2019-12-16

## 2019-12-16 PROCEDURE — 11042 DBRDMT SUBQ TIS 1ST 20SQCM/<: CPT

## 2019-12-24 ENCOUNTER — APPOINTMENT (OUTPATIENT)
Dept: WOUND CARE | Facility: CLINIC | Age: 39
End: 2019-12-24

## 2019-12-24 PROCEDURE — 29581 APPL MULTLAYER CMPRN SYS LEG: CPT

## 2019-12-30 ENCOUNTER — APPOINTMENT (OUTPATIENT)
Dept: WOUND CARE | Facility: CLINIC | Age: 39
End: 2019-12-30

## 2019-12-30 PROCEDURE — 11042 DBRDMT SUBQ TIS 1ST 20SQCM/<: CPT | Performed by: REGISTERED NURSE

## 2019-12-31 ENCOUNTER — TELEPHONE (OUTPATIENT)
Dept: NEUROLOGY | Facility: CLINIC | Age: 39
End: 2019-12-31

## 2019-12-31 NOTE — TELEPHONE ENCOUNTER
Left a message on the patients voicemail to have his Levetiracetam level completed before his visit  I left my name and number for the patient to call me back if he has any questions

## 2020-01-03 ENCOUNTER — APPOINTMENT (OUTPATIENT)
Dept: LAB | Facility: HOSPITAL | Age: 40
End: 2020-01-03
Payer: COMMERCIAL

## 2020-01-03 DIAGNOSIS — R56.9 SEIZURES (HCC): ICD-10-CM

## 2020-01-03 PROCEDURE — 36415 COLL VENOUS BLD VENIPUNCTURE: CPT

## 2020-01-03 PROCEDURE — 80177 DRUG SCRN QUAN LEVETIRACETAM: CPT

## 2020-01-06 ENCOUNTER — APPOINTMENT (OUTPATIENT)
Dept: WOUND CARE | Facility: CLINIC | Age: 40
End: 2020-01-06
Payer: COMMERCIAL

## 2020-01-06 LAB — LEVETIRACETAM SERPL-MCNC: 12.1 UG/ML (ref 10–40)

## 2020-01-06 PROCEDURE — 97597 DBRDMT OPN WND 1ST 20 CM/<: CPT | Performed by: REGISTERED NURSE

## 2020-01-08 ENCOUNTER — OFFICE VISIT (OUTPATIENT)
Dept: NEUROLOGY | Facility: CLINIC | Age: 40
End: 2020-01-08
Payer: COMMERCIAL

## 2020-01-08 VITALS
WEIGHT: 210.4 LBS | DIASTOLIC BLOOD PRESSURE: 90 MMHG | SYSTOLIC BLOOD PRESSURE: 158 MMHG | HEART RATE: 79 BPM | HEIGHT: 70 IN | RESPIRATION RATE: 18 BRPM | BODY MASS INDEX: 30.12 KG/M2

## 2020-01-08 DIAGNOSIS — R56.9 SEIZURES (HCC): Primary | ICD-10-CM

## 2020-01-08 PROCEDURE — 99213 OFFICE O/P EST LOW 20 MIN: CPT | Performed by: PSYCHIATRY & NEUROLOGY

## 2020-01-08 RX ORDER — LEVETIRACETAM 750 MG/1
750 TABLET ORAL 2 TIMES DAILY
Qty: 60 TABLET | Refills: 5 | Status: SHIPPED | OUTPATIENT
Start: 2020-01-08 | End: 2020-01-08 | Stop reason: SDUPTHER

## 2020-01-08 RX ORDER — LEVETIRACETAM 750 MG/1
750 TABLET ORAL 2 TIMES DAILY
Qty: 60 TABLET | Refills: 5 | Status: SHIPPED | OUTPATIENT
Start: 2020-01-08 | End: 2020-08-07 | Stop reason: SDUPTHER

## 2020-01-08 NOTE — ASSESSMENT & PLAN NOTE
Has done well with no seizure or seizure-like symptoms reported since his event of July 2, 2019  He has remained on levetiracetam 500 mg twice daily  He reports no symptoms to suggest any adverse side effects  In review his 1st event in February of 2018 was suggestively alcohol related  His 2nd event on July 2, 2019 did not appear to be so  As result, he was placed on coverage with levetiracetam   --on levetiracetam 500 mg twice daily his level was in the low reference range at 12 1  Would like to see him, especially with his size, on a slightly higher schedule  Will now advanced to levetiracetam 750 mg twice daily  --will check CBC and CMP with him now on levetiracetam chronically  --[employer] forms for return of license were filled out for him

## 2020-01-08 NOTE — PATIENT INSTRUCTIONS
Use your Keppra 500 mg tablets by taking 1 5 tablets twice daily  When your current 500 mg supply is exhausted, switch to Keppra 750 mg tablets taking 1 tablet twice daily

## 2020-01-08 NOTE — PROGRESS NOTES
Patient ID: Rosales Marlow is a 37 y o  male  Assessment/Plan:    Seizures ([facility] [address] )  Has done well with no seizure or seizure-like symptoms reported since his event of July 2, 2019  He has remained on levetiracetam 500 mg twice daily  He reports no symptoms to suggest any adverse side effects  In review his 1st event in February of 2018 was suggestively alcohol related  His 2nd event on July 2, 2019 did not appear to be so  As result, he was placed on coverage with levetiracetam   --on levetiracetam 500 mg twice daily his level was in the low reference range at 12 1  Would like to see him, especially with his size, on a slightly higher schedule  Will now advanced to levetiracetam 750 mg twice daily  --will check CBC and CMP with him now on levetiracetam chronically  --[employer] forms for return of license were filled out for him  He will follow up in 6 months or as needed  Subjective:    HPI  Patient, 37years of age, continues his care here given his seizure episodes  In review his 1st episode occurred in February of 2018 was thought to be provoked by alcohol  However, a 2nd episode occurred on July 2nd of 2019 and did not appear to have any relationship with the use of alcohol  As result he was started on coverage with levetiracetam   He has remained on levetiracetam 500 mg twice daily  He has had no seizure or seizure-like symptoms  He reports no symptoms to suggest any adverse side effects  Workup has included unremarkable brain MRI along with normal EEG  On his current schedule of levetiracetam level was 12 1, in the low reference range      Past Medical History:   Diagnosis Date    Alcohol abuse     Asthma     GERD (gastroesophageal reflux disease)     w/o esophagitis    Hx of echocardiogram 06/18/2018    negative for ischemia    Hypertension     Seizures ([facility] [address] )     Early spring / late winter of 2018 was last and only seizure     Past Surgical History:   Procedure Laterality Date    CARPAL TUNNEL RELEASE Bilateral     CYST REMOVAL       Social History     Socioeconomic History    Marital status: /Civil Union     Spouse name: None    Number of children: None    Years of education: None    Highest education level: None   Occupational History    None   Social Needs    Financial resource strain: None    Food insecurity:     Worry: None     Inability: None    Transportation needs:     Medical: None     Non-medical: None   Tobacco Use    Smoking status: Current Every Day Smoker     Packs/day: 2 00     Types: Cigarettes    Smokeless tobacco: Never Used   Substance and Sexual Activity    Alcohol use: Yes     Comment: social    Drug use: No    Sexual activity: None   Lifestyle    Physical activity:     Days per week: None     Minutes per session: None    Stress: None   Relationships    Social connections:     Talks on phone: None     Gets together: None     Attends Yazidi service: None     Active member of club or organization: None     Attends meetings of clubs or organizations: None     Relationship status: None    Intimate partner violence:     Fear of current or ex partner: None     Emotionally abused: None     Physically abused: None     Forced sexual activity: None   Other Topics Concern    None   Social History Narrative    Lives with in [place], with wife, four kids     Family History   Problem Relation Age of Onset    Stroke Father     Heart attack Father     Coronary artery disease Family         Less than 60 yrs of age    Seizures Neg Hx      Allergies   Allergen Reactions    Nonoxynol 9        Current Outpatient Medications:     amLODIPine (NORVASC) 2 5 mg tablet, Take 1 tablet (2 5 mg total) by mouth daily, Disp: 90 tablet, Rfl: 0    levETIRAcetam (KEPPRA) 750 mg tablet, Take 1 tablet (750 mg total) by mouth 2 (two) times a day, Disp: 60 tablet, Rfl: 5    metoprolol succinate (TOPROL-XL) 50 mg 24 hr tablet, Take 1 tablet (50 mg total) by mouth daily (Patient not taking: Reported on 1/8/2020), Disp: 90 tablet, Rfl: 0    saccharomyces boulardii (FLORASTOR) 250 mg capsule, Take 1 capsule (250 mg total) by mouth 2 (two) times a day (Patient not taking: Reported on 1/8/2020), Disp: 30 capsule, Rfl: 0    Objective:    Blood pressure 158/90, pulse 79, resp  rate 18, height 5' 10" (1 778 m), weight 95 4 kg (210 lb 6 4 oz)  Physical Exam  Head normocephalic  Eyes nonicteric  No audible anterior neck bruits  Lungs clear to auscultation  Rhythm regular  GI (abdomen) soft nontender  Bowel sounds present  No significant lower extremity edema  Neurological Exam  Alert  Fully oriented  No dysarthria  Unremarkable gait  Cranial nerves 2-12 tested and grossly intact  Accurate finger-to-nose bilaterally  Full symmetrical strength throughout the 4 extremities  No upper extremity drift  Muscle stretch reflexes bilaterally 1 throughout the upper extremities and bilaterally 1+ at the knees and ankles  ROS:    Review of Systems   Constitutional: Negative  Negative for appetite change and fever  HENT: Positive for congestion, sinus pressure and sinus pain  Negative for hearing loss, tinnitus, trouble swallowing and voice change  Eyes: Negative  Negative for photophobia and pain  Respiratory: Negative  Negative for shortness of breath  Cardiovascular: Negative  Negative for palpitations  Gastrointestinal: Negative  Negative for nausea and vomiting  Endocrine: Negative  Negative for cold intolerance and heat intolerance  Genitourinary: Negative  Negative for dysuria, frequency and urgency  Musculoskeletal: Negative  Negative for myalgias and neck pain  Skin: Negative  Negative for rash  Allergic/Immunologic: Negative  Neurological: Negative  Negative for dizziness, tremors, seizures, syncope, facial asymmetry, speech difficulty, weakness, light-headedness, numbness and headaches  Hematological: Negative    Does not bruise/bleed easily  Psychiatric/Behavioral: Negative  Negative for confusion, hallucinations and sleep disturbance  I personally reviewed the ROS as entered by the medical assistant  *Please note this document was created using voice recognition software and may contain sound-alike word errors  *

## 2020-01-08 NOTE — LETTER
January 8, 2020     Kenisha Cousin, [address] [id number]    Patient: Gregoria Priest   YOB: 1980   Date of Visit: 1/8/2020       Dear Dr Nayak Expose: Thank you for referring Paulina Ibarra to me for evaluation  Below are my notes for this consultation  If you have questions, please do not hesitate to call me  I look forward to following your patient along with you  Sincerely,        Kaur Fried MD        CC: No Recipients  Kaur Fried MD  1/8/2020  3:44 PM  Sign at close encounter  Patient ID: Gregoria Priest is a 37 y o  male  Assessment/Plan:    Seizures ([address] )  Has done well with no seizure or seizure-like symptoms reported since his event of July 2, 2019  He has remained on levetiracetam 500 mg twice daily  He reports no symptoms to suggest any adverse side effects  In review his 1st event in February of 2018 was suggestively alcohol related  His 2nd event on July 2, 2019 did not appear to be so  As result, he was placed on coverage with levetiracetam   --on levetiracetam 500 mg twice daily his level was in the low reference range at 12 1  Would like to see him, especially with his size, on a slightly higher schedule  Will now advanced to levetiracetam 750 mg twice daily  --will check CBC and CMP with him now on levetiracetam chronically  --[employer] forms for return of license were filled out for him  He will follow up in 6 months or as needed  Subjective:    HPI  Patient, 37years of age, continues his care here given his seizure episodes  In review his 1st episode occurred in February of 2018 was thought to be provoked by alcohol  However, a 2nd episode occurred on July 2nd of 2019 and did not appear to have any relationship with the use of alcohol  As result he was started on coverage with levetiracetam   He has remained on levetiracetam 500 mg twice daily  He has had no seizure or seizure-like symptoms    He reports no symptoms to suggest any adverse side effects  Workup has included unremarkable brain MRI along with normal EEG  On his current schedule of levetiracetam level was 12 1, in the low reference range      Past Medical History:   Diagnosis Date    Alcohol abuse     Asthma     GERD (gastroesophageal reflux disease)     w/o esophagitis    Hx of echocardiogram 06/18/2018    negative for ischemia    Hypertension     Seizures ([facility] [address] )     Early spring / late winter of 2018 was last and only seizure     Past Surgical History:   Procedure Laterality Date    CARPAL TUNNEL RELEASE Bilateral     CYST REMOVAL       Social History     Socioeconomic History    Marital status: /Civil Union     Spouse name: None    Number of children: None    Years of education: None    Highest education level: None   Occupational History    None   Social Needs    Financial resource strain: None    Food insecurity:     Worry: None     Inability: None    Transportation needs:     Medical: None     Non-medical: None   Tobacco Use    Smoking status: Current Every Day Smoker     Packs/day: 2 00     Types: Cigarettes    Smokeless tobacco: Never Used   Substance and Sexual Activity    Alcohol use: Yes     Comment: social    Drug use: No    Sexual activity: None   Lifestyle    Physical activity:     Days per week: None     Minutes per session: None    Stress: None   Relationships    Social connections:     Talks on phone: None     Gets together: None     Attends Rastafari service: None     Active member of club or organization: None     Attends meetings of clubs or organizations: None     Relationship status: None    Intimate partner violence:     Fear of current or ex partner: None     Emotionally abused: None     Physically abused: None     Forced sexual activity: None   Other Topics Concern    None   Social History Narrative    Lives with in [place], with wife, four kids     Family History   Problem Relation Age of Onset    Stroke Father     Heart attack Father     Coronary artery disease Family         Less than 60 yrs of age   [place] Seizures Neg Hx      Allergies   Allergen Reactions    Nonoxynol 9        Current Outpatient Medications:     amLODIPine (NORVASC) 2 5 mg tablet, Take 1 tablet (2 5 mg total) by mouth daily, Disp: 90 tablet, Rfl: 0    levETIRAcetam (KEPPRA) 750 mg tablet, Take 1 tablet (750 mg total) by mouth 2 (two) times a day, Disp: 60 tablet, Rfl: 5    metoprolol succinate (TOPROL-XL) 50 mg 24 hr tablet, Take 1 tablet (50 mg total) by mouth daily (Patient not taking: Reported on 1/8/2020), Disp: 90 tablet, Rfl: 0    saccharomyces boulardii (FLORASTOR) 250 mg capsule, Take 1 capsule (250 mg total) by mouth 2 (two) times a day (Patient not taking: Reported on 1/8/2020), Disp: 30 capsule, Rfl: 0    Objective:    Blood pressure 158/90, pulse 79, resp  rate 18, height 5' 10" (1 778 m), weight 95 4 kg (210 lb 6 4 oz)  Physical Exam  Head normocephalic  Eyes nonicteric  No audible anterior neck bruits  Lungs clear to auscultation  Rhythm regular  GI (abdomen) soft nontender  Bowel sounds present  No significant lower extremity edema  Neurological Exam  Alert  Fully oriented  No dysarthria  Unremarkable gait  Cranial nerves 2-12 tested and grossly intact  Accurate finger-to-nose bilaterally  Full symmetrical strength throughout the 4 extremities  No upper extremity drift  Muscle stretch reflexes bilaterally 1 throughout the upper extremities and bilaterally 1+ at the knees and ankles  ROS:    Review of Systems   Constitutional: Negative  Negative for appetite change and fever  HENT: Positive for congestion, sinus pressure and sinus pain  Negative for hearing loss, tinnitus, trouble swallowing and voice change  Eyes: Negative  Negative for photophobia and pain  Respiratory: Negative  Negative for shortness of breath  Cardiovascular: Negative  Negative for palpitations  Gastrointestinal: Negative  Negative for nausea and vomiting  Endocrine: Negative  Negative for cold intolerance and heat intolerance  Genitourinary: Negative  Negative for dysuria, frequency and urgency  Musculoskeletal: Negative  Negative for myalgias and neck pain  Skin: Negative  Negative for rash  Allergic/Immunologic: Negative  Neurological: Negative  Negative for dizziness, tremors, seizures, syncope, facial asymmetry, speech difficulty, weakness, light-headedness, numbness and headaches  Hematological: Negative  Does not bruise/bleed easily  Psychiatric/Behavioral: Negative  Negative for confusion, hallucinations and sleep disturbance  I personally reviewed the ROS as entered by the medical assistant  *Please note this document was created using voice recognition software and may contain sound-alike word errors  *

## 2020-01-13 ENCOUNTER — APPOINTMENT (OUTPATIENT)
Dept: WOUND CARE | Facility: CLINIC | Age: 40
End: 2020-01-13
Payer: COMMERCIAL

## 2020-01-13 PROCEDURE — 97597 DBRDMT OPN WND 1ST 20 CM/<: CPT | Performed by: REGISTERED NURSE

## 2020-01-17 ENCOUNTER — TELEPHONE (OUTPATIENT)
Dept: NEUROLOGY | Facility: CLINIC | Age: 40
End: 2020-01-17

## 2020-01-17 NOTE — TELEPHONE ENCOUNTER
pt called and states that stephy did not received forms  he is asking that they be faxed to [facility]    attn-kiana [phone]  [facility]

## 2020-03-11 DIAGNOSIS — I10 HTN (HYPERTENSION): ICD-10-CM

## 2020-03-11 RX ORDER — METOPROLOL SUCCINATE 50 MG/1
50 TABLET, EXTENDED RELEASE ORAL DAILY
Qty: 90 TABLET | Refills: 0 | Status: SHIPPED | OUTPATIENT
Start: 2020-03-11 | End: 2020-03-16 | Stop reason: ALTCHOICE

## 2020-03-12 RX ORDER — AMLODIPINE BESYLATE 2.5 MG/1
TABLET ORAL
Qty: 90 TABLET | Refills: 0 | Status: SHIPPED | OUTPATIENT
Start: 2020-03-12 | End: 2020-06-16 | Stop reason: SDUPTHER

## 2020-03-16 ENCOUNTER — OFFICE VISIT (OUTPATIENT)
Dept: FAMILY MEDICINE CLINIC | Facility: HOME HEALTHCARE | Age: 40
End: 2020-03-16
Payer: COMMERCIAL

## 2020-03-16 VITALS
RESPIRATION RATE: 18 BRPM | OXYGEN SATURATION: 97 % | HEIGHT: 70 IN | WEIGHT: 209.4 LBS | DIASTOLIC BLOOD PRESSURE: 70 MMHG | HEART RATE: 88 BPM | BODY MASS INDEX: 29.98 KG/M2 | TEMPERATURE: 98.9 F | SYSTOLIC BLOOD PRESSURE: 150 MMHG

## 2020-03-16 DIAGNOSIS — Z13.29 SCREENING FOR ENDOCRINE, NUTRITIONAL, METABOLIC AND IMMUNITY DISORDER: ICD-10-CM

## 2020-03-16 DIAGNOSIS — Z13.0 SCREENING FOR ENDOCRINE, NUTRITIONAL, METABOLIC AND IMMUNITY DISORDER: ICD-10-CM

## 2020-03-16 DIAGNOSIS — Z11.4 SCREENING FOR HIV WITHOUT PRESENCE OF RISK FACTORS: Primary | ICD-10-CM

## 2020-03-16 DIAGNOSIS — J30.9 ALLERGIC RHINITIS, UNSPECIFIED SEASONALITY, UNSPECIFIED TRIGGER: ICD-10-CM

## 2020-03-16 DIAGNOSIS — Z13.228 SCREENING FOR ENDOCRINE, NUTRITIONAL, METABOLIC AND IMMUNITY DISORDER: ICD-10-CM

## 2020-03-16 DIAGNOSIS — I49.9 IRREGULAR HEART BEAT: ICD-10-CM

## 2020-03-16 DIAGNOSIS — I10 HTN (HYPERTENSION): ICD-10-CM

## 2020-03-16 DIAGNOSIS — Z13.21 SCREENING FOR ENDOCRINE, NUTRITIONAL, METABOLIC AND IMMUNITY DISORDER: ICD-10-CM

## 2020-03-16 PROCEDURE — 99213 OFFICE O/P EST LOW 20 MIN: CPT | Performed by: FAMILY MEDICINE

## 2020-03-16 RX ORDER — LORATADINE 10 MG/1
10 TABLET ORAL DAILY
Qty: 90 TABLET | Refills: 0 | Status: SHIPPED | OUTPATIENT
Start: 2020-03-16 | End: 2020-10-28 | Stop reason: SDUPTHER

## 2020-03-16 NOTE — PATIENT INSTRUCTIONS
Check blood pressure regularly  Purchase home monitoring of blood pressure device if insurance does not cover  Eat a healthy diet, reviewed DASH diet  Do not add salt salt to food  Reduce saturated fats  Encourage physical activity, 30 minutes daily  Limit alcohol use  Smoking cessation if applicable  Stress reduction  Smoking cessation advised  Reduction of personal exposure to occupation dusts, fumes, and gases  Reduction to indoor and outdoor air pollutants  Advised influenza vaccine  Pneumococcal vaccine advised

## 2020-03-16 NOTE — PROGRESS NOTES
[address]       NAME: Juan Alberto Katz is a 37 y o  male  : 1980    MRN: [id number]  DATE: 2020  TIME: 9:46 AM    Assessment and Plan   Diagnoses and all orders for this visit:    Screening for HIV without presence of risk factors  -     HIV 1/2 Antigen/Antibody (4th Generation) w Reflex [facility]; Future    HTN (hypertension)    Irregular heart beat  -     Ambulatory referral to Cardiology; Future    Screening for endocrine, nutritional, metabolic and immunity disorder  -     CBC and differential; Future  -     Comprehensive metabolic panel; Future  -     TSH, 3rd generation with Free T4 reflex; Future  -     Lipid Panel with Direct LDL reflex; Future  -     HEMOGLOBIN A1C W/ EAG ESTIMATION; Future    Allergic rhinitis, unspecified seasonality, unspecified trigger  -     loratadine (CLARITIN) 10 mg tablet; Take 1 tablet (10 mg total) by mouth daily        No problem-specific Assessment & Plan notes found for this encounter  Patient Instructions           Chief Complaint     Chief Complaint   Patient presents with    Hypertension     routine check per pt    GERD         History of Present Illness       Leonela Adams is here for routine visit, does have history of seizures for which he is followed by Neurology  Continues on levetiracetam, no seizure activity since 2019  Does have history of irregular heartbeat for which he was supposed to have followed up with cardiology a few weeks ago but admits that he forgot his appointment  Did suggest that he call to reschedule, referral placed if he would need it  Is a current every day smoker, declines smoking cessation  Does have history of hypertension, and is hypertensive on exam   Reports that he typically takes his hypertensive medications in the evening therefore has not yet taken it today  He is going to try taking the medication in the morning and will follow-up for blood pressure check in 1 month    If he remains elevated at that time we will increase his dosage  He is agreeable to this plan and verbalizes understanding  Is due for routine blood work  Review of Systems   Review of Systems   Constitutional: Negative for chills, fatigue, fever and unexpected weight change  HENT: Negative for postnasal drip, sinus pressure and sore throat  Eyes: Negative for discharge  Respiratory: Negative for chest tightness, shortness of breath and wheezing  Cardiovascular: Negative for chest pain  Gastrointestinal: Negative for constipation, diarrhea and vomiting  Musculoskeletal: Negative for arthralgias  Skin: Negative for rash  Neurological: Negative for dizziness and syncope  Current Medications       Current Outpatient Medications:     amLODIPine (NORVASC) 2 5 mg tablet, Take 1 tablet by mouth once daily, Disp: 90 tablet, Rfl: 0    levETIRAcetam (KEPPRA) 750 mg tablet, Take 1 tablet (750 mg total) by mouth 2 (two) times a day, Disp: 60 tablet, Rfl: 5    loratadine (CLARITIN) 10 mg tablet, Take 1 tablet (10 mg total) by mouth daily, Disp: 90 tablet, Rfl: 0    Current Allergies     Allergies as of 03/16/2020 - Reviewed 03/16/2020   Allergen Reaction Noted    Nonoxynol 9 Other (See Comments) 02/26/2018            The following portions of the patient's history were reviewed and updated as appropriate: allergies, current medications, past family history, past medical history, past social history, past surgical history and problem list      Past Medical History:   Diagnosis Date    Alcohol abuse     Asthma     GERD (gastroesophageal reflux disease)     w/o esophagitis    History of cardiovascular stress test 06/18/2018    Exercise stress echo - Frequent PVCs, brief runs of NSVT  Echo portion negative for ischemia      Hx of echocardiogram 06/18/2018    negative for ischemia    Hypertension     PVC's (premature ventricular contractions)     Seizures ([facility] [address] )     Early spring / late winter of 2018 was last and only seizure       Past Surgical History:   Procedure Laterality Date    CARPAL TUNNEL RELEASE Bilateral     CYST REMOVAL         Family History   Problem Relation Age of Onset    Stroke Father     Heart attack Father     Coronary artery disease Family         Less than 60 yrs of age    Seizures Neg Hx          Medications have been verified  Objective   /70 (BP Location: Right arm, Patient Position: Sitting, Cuff Size: Large)   Pulse 88   Temp 98 9 °F (37 2 °C) (Temporal)   Resp 18   Ht 5' 10" (1 778 m)   Wt 95 kg (209 lb 6 4 oz)   SpO2 97%   BMI 30 05 kg/m²        Physical Exam     Physical Exam   Constitutional: He is oriented to person, place, and time  He appears well-developed and well-nourished  No distress  HENT:   Right Ear: External ear normal    Left Ear: External ear normal    Nose: Nose normal    Mouth/Throat: Oropharynx is clear and moist    Eyes: Pupils are equal, round, and reactive to light  EOM are normal    Neck: Normal range of motion  Neck supple  Cardiovascular: Normal rate, regular rhythm, normal heart sounds and intact distal pulses  Pulmonary/Chest: Effort normal and breath sounds normal    Abdominal: Soft  Musculoskeletal: Normal range of motion  Lymphadenopathy:     He has no cervical adenopathy  Neurological: He is alert and oriented to person, place, and time  Skin: Skin is warm and dry  Capillary refill takes less than 2 seconds  Psychiatric: He has a normal mood and affect  Nursing note and vitals reviewed

## 2020-04-21 ENCOUNTER — TELEMEDICINE (OUTPATIENT)
Dept: FAMILY MEDICINE CLINIC | Facility: HOME HEALTHCARE | Age: 40
End: 2020-04-21
Payer: COMMERCIAL

## 2020-04-21 DIAGNOSIS — I10 ESSENTIAL HYPERTENSION: Primary | ICD-10-CM

## 2020-04-21 PROCEDURE — G0071 COMM SVCS BY RHC/FQHC 5 MIN: HCPCS | Performed by: FAMILY MEDICINE

## 2020-06-16 ENCOUNTER — OFFICE VISIT (OUTPATIENT)
Dept: FAMILY MEDICINE CLINIC | Facility: HOME HEALTHCARE | Age: 40
End: 2020-06-16
Payer: COMMERCIAL

## 2020-06-16 VITALS
HEART RATE: 68 BPM | SYSTOLIC BLOOD PRESSURE: 132 MMHG | HEIGHT: 70 IN | OXYGEN SATURATION: 98 % | RESPIRATION RATE: 18 BRPM | WEIGHT: 213.2 LBS | TEMPERATURE: 98.7 F | BODY MASS INDEX: 30.52 KG/M2 | DIASTOLIC BLOOD PRESSURE: 80 MMHG

## 2020-06-16 DIAGNOSIS — Z13.228 SCREENING FOR ENDOCRINE, NUTRITIONAL, METABOLIC AND IMMUNITY DISORDER: ICD-10-CM

## 2020-06-16 DIAGNOSIS — I10 HTN (HYPERTENSION): ICD-10-CM

## 2020-06-16 DIAGNOSIS — Z11.4 SCREENING FOR HIV WITHOUT PRESENCE OF RISK FACTORS: ICD-10-CM

## 2020-06-16 DIAGNOSIS — Z13.0 SCREENING FOR ENDOCRINE, NUTRITIONAL, METABOLIC AND IMMUNITY DISORDER: ICD-10-CM

## 2020-06-16 DIAGNOSIS — Z13.21 SCREENING FOR ENDOCRINE, NUTRITIONAL, METABOLIC AND IMMUNITY DISORDER: ICD-10-CM

## 2020-06-16 DIAGNOSIS — Z13.29 SCREENING FOR ENDOCRINE, NUTRITIONAL, METABOLIC AND IMMUNITY DISORDER: ICD-10-CM

## 2020-06-16 DIAGNOSIS — I10 ESSENTIAL HYPERTENSION: Primary | ICD-10-CM

## 2020-06-16 LAB
ALBUMIN SERPL BCP-MCNC: 3.6 G/DL (ref 3.5–5)
ALP SERPL-CCNC: 78 U/L (ref 46–116)
ALT SERPL W P-5'-P-CCNC: 44 U/L (ref 12–78)
ANION GAP SERPL CALCULATED.3IONS-SCNC: 6 MMOL/L (ref 4–13)
AST SERPL W P-5'-P-CCNC: 33 U/L (ref 5–45)
BASOPHILS # BLD AUTO: 0.12 THOUSANDS/ΜL (ref 0–0.1)
BASOPHILS NFR BLD AUTO: 2 % (ref 0–1)
BILIRUB SERPL-MCNC: 1.1 MG/DL (ref 0.2–1)
BUN SERPL-MCNC: 9 MG/DL (ref 5–25)
CALCIUM SERPL-MCNC: 8.9 MG/DL (ref 8.3–10.1)
CHLORIDE SERPL-SCNC: 104 MMOL/L (ref 100–108)
CHOLEST SERPL-MCNC: 203 MG/DL (ref 50–200)
CO2 SERPL-SCNC: 25 MMOL/L (ref 21–32)
CREAT SERPL-MCNC: 1.02 MG/DL (ref 0.6–1.3)
EOSINOPHIL # BLD AUTO: 0.32 THOUSAND/ΜL (ref 0–0.61)
EOSINOPHIL NFR BLD AUTO: 5 % (ref 0–6)
ERYTHROCYTE [DISTWIDTH] IN BLOOD BY AUTOMATED COUNT: 12.3 % (ref 11.6–15.1)
EST. AVERAGE GLUCOSE BLD GHB EST-MCNC: 103 MG/DL
GFR SERPL CREATININE-BSD FRML MDRD: 90 ML/MIN/1.73SQ M
GLUCOSE P FAST SERPL-MCNC: 105 MG/DL (ref 65–99)
HBA1C MFR BLD: 5.2 %
HCT VFR BLD AUTO: 50.3 % (ref 36.5–49.3)
HDLC SERPL-MCNC: 44 MG/DL
HGB BLD-MCNC: 17.3 G/DL (ref 12–17)
IMM GRANULOCYTES # BLD AUTO: 0.02 THOUSAND/UL (ref 0–0.2)
IMM GRANULOCYTES NFR BLD AUTO: 0 % (ref 0–2)
LDLC SERPL DIRECT ASSAY-MCNC: 108 MG/DL (ref 0–100)
LYMPHOCYTES # BLD AUTO: 1.75 THOUSANDS/ΜL (ref 0.6–4.47)
LYMPHOCYTES NFR BLD AUTO: 29 % (ref 14–44)
MCH RBC QN AUTO: 32.1 PG (ref 26.8–34.3)
MCHC RBC AUTO-ENTMCNC: 34.4 G/DL (ref 31.4–37.4)
MCV RBC AUTO: 93 FL (ref 82–98)
MONOCYTES # BLD AUTO: 0.45 THOUSAND/ΜL (ref 0.17–1.22)
MONOCYTES NFR BLD AUTO: 8 % (ref 4–12)
NEUTROPHILS # BLD AUTO: 3.38 THOUSANDS/ΜL (ref 1.85–7.62)
NEUTS SEG NFR BLD AUTO: 56 % (ref 43–75)
NRBC BLD AUTO-RTO: 0 /100 WBCS
PLATELET # BLD AUTO: 201 THOUSANDS/UL (ref 149–390)
PMV BLD AUTO: 10.3 FL (ref 8.9–12.7)
POTASSIUM SERPL-SCNC: 4.1 MMOL/L (ref 3.5–5.3)
PROT SERPL-MCNC: 7.7 G/DL (ref 6.4–8.2)
RBC # BLD AUTO: 5.39 MILLION/UL (ref 3.88–5.62)
SODIUM SERPL-SCNC: 135 MMOL/L (ref 136–145)
TRIGL SERPL-MCNC: 490 MG/DL
TSH SERPL DL<=0.05 MIU/L-ACNC: 1.9 UIU/ML (ref 0.36–3.74)
WBC # BLD AUTO: 6.04 THOUSAND/UL (ref 4.31–10.16)

## 2020-06-16 PROCEDURE — 83036 HEMOGLOBIN GLYCOSYLATED A1C: CPT | Performed by: NURSE PRACTITIONER

## 2020-06-16 PROCEDURE — 85025 COMPLETE CBC W/AUTO DIFF WBC: CPT | Performed by: NURSE PRACTITIONER

## 2020-06-16 PROCEDURE — 84443 ASSAY THYROID STIM HORMONE: CPT | Performed by: NURSE PRACTITIONER

## 2020-06-16 PROCEDURE — 3008F BODY MASS INDEX DOCD: CPT | Performed by: PSYCHIATRY & NEUROLOGY

## 2020-06-16 PROCEDURE — 99213 OFFICE O/P EST LOW 20 MIN: CPT | Performed by: FAMILY MEDICINE

## 2020-06-16 PROCEDURE — 83721 ASSAY OF BLOOD LIPOPROTEIN: CPT

## 2020-06-16 PROCEDURE — 80053 COMPREHEN METABOLIC PANEL: CPT | Performed by: NURSE PRACTITIONER

## 2020-06-16 PROCEDURE — 80061 LIPID PANEL: CPT | Performed by: NURSE PRACTITIONER

## 2020-06-16 PROCEDURE — 87389 HIV-1 AG W/HIV-1&-2 AB AG IA: CPT | Performed by: NURSE PRACTITIONER

## 2020-06-16 RX ORDER — AMLODIPINE BESYLATE 2.5 MG/1
2.5 TABLET ORAL DAILY
Qty: 90 TABLET | Refills: 0 | Status: SHIPPED | OUTPATIENT
Start: 2020-06-16 | End: 2020-09-10 | Stop reason: SDUPTHER

## 2020-06-17 DIAGNOSIS — E78.2 MIXED HYPERLIPIDEMIA: ICD-10-CM

## 2020-06-17 DIAGNOSIS — E78.1 HIGH BLOOD TRIGLYCERIDES: Primary | ICD-10-CM

## 2020-06-17 LAB — HIV 1+2 AB+HIV1 P24 AG SERPL QL IA: NORMAL

## 2020-06-17 RX ORDER — FENOFIBRATE 145 MG/1
145 TABLET, COATED ORAL DAILY
Qty: 30 TABLET | Refills: 2 | Status: SHIPPED | OUTPATIENT
Start: 2020-06-17 | End: 2020-09-10 | Stop reason: SDUPTHER

## 2020-06-17 RX ORDER — ATORVASTATIN CALCIUM 10 MG/1
10 TABLET, FILM COATED ORAL DAILY
Qty: 30 TABLET | Refills: 2 | Status: SHIPPED | OUTPATIENT
Start: 2020-06-17 | End: 2020-06-17 | Stop reason: CLARIF

## 2020-08-07 DIAGNOSIS — R56.9 SEIZURES (HCC): ICD-10-CM

## 2020-08-07 RX ORDER — LEVETIRACETAM 750 MG/1
750 TABLET ORAL 2 TIMES DAILY
Qty: 60 TABLET | Refills: 5 | Status: SHIPPED | OUTPATIENT
Start: 2020-08-07 | End: 2020-09-10 | Stop reason: SDUPTHER

## 2020-09-10 DIAGNOSIS — I10 HTN (HYPERTENSION): ICD-10-CM

## 2020-09-10 DIAGNOSIS — R56.9 SEIZURES (HCC): ICD-10-CM

## 2020-09-10 DIAGNOSIS — E78.1 HIGH BLOOD TRIGLYCERIDES: ICD-10-CM

## 2020-09-10 RX ORDER — LEVETIRACETAM 750 MG/1
750 TABLET ORAL 2 TIMES DAILY
Qty: 60 TABLET | Refills: 5 | Status: SHIPPED | OUTPATIENT
Start: 2020-09-10 | End: 2020-10-12 | Stop reason: SDUPTHER

## 2020-09-10 RX ORDER — FENOFIBRATE 145 MG/1
145 TABLET, COATED ORAL DAILY
Qty: 30 TABLET | Refills: 2 | Status: SHIPPED | OUTPATIENT
Start: 2020-09-10 | End: 2020-10-12 | Stop reason: SDUPTHER

## 2020-09-10 RX ORDER — AMLODIPINE BESYLATE 2.5 MG/1
2.5 TABLET ORAL DAILY
Qty: 90 TABLET | Refills: 0 | Status: SHIPPED | OUTPATIENT
Start: 2020-09-10 | End: 2020-11-12 | Stop reason: SDUPTHER

## 2020-10-12 DIAGNOSIS — E78.1 HIGH BLOOD TRIGLYCERIDES: ICD-10-CM

## 2020-10-12 DIAGNOSIS — R56.9 SEIZURES (HCC): ICD-10-CM

## 2020-10-12 RX ORDER — LEVETIRACETAM 750 MG/1
750 TABLET ORAL 2 TIMES DAILY
Qty: 60 TABLET | Refills: 5 | Status: SHIPPED | OUTPATIENT
Start: 2020-10-12 | End: 2020-11-12 | Stop reason: SDUPTHER

## 2020-10-12 RX ORDER — FENOFIBRATE 145 MG/1
145 TABLET, COATED ORAL DAILY
Qty: 30 TABLET | Refills: 2 | Status: SHIPPED | OUTPATIENT
Start: 2020-10-12 | End: 2020-11-12 | Stop reason: SDUPTHER

## 2020-10-28 ENCOUNTER — OFFICE VISIT (OUTPATIENT)
Dept: FAMILY MEDICINE CLINIC | Facility: HOME HEALTHCARE | Age: 40
End: 2020-10-28
Payer: COMMERCIAL

## 2020-10-28 VITALS
WEIGHT: 204.6 LBS | BODY MASS INDEX: 29.29 KG/M2 | HEIGHT: 70 IN | HEART RATE: 80 BPM | TEMPERATURE: 99.3 F | OXYGEN SATURATION: 96 % | RESPIRATION RATE: 18 BRPM | DIASTOLIC BLOOD PRESSURE: 80 MMHG | SYSTOLIC BLOOD PRESSURE: 124 MMHG

## 2020-10-28 DIAGNOSIS — J30.9 ALLERGIC RHINITIS, UNSPECIFIED SEASONALITY, UNSPECIFIED TRIGGER: ICD-10-CM

## 2020-10-28 DIAGNOSIS — J01.10 ACUTE FRONTAL SINUSITIS, RECURRENCE NOT SPECIFIED: Primary | ICD-10-CM

## 2020-10-28 DIAGNOSIS — Z23 IMMUNIZATION DUE: ICD-10-CM

## 2020-10-28 PROCEDURE — 90471 IMMUNIZATION ADMIN: CPT | Performed by: FAMILY MEDICINE

## 2020-10-28 PROCEDURE — 99213 OFFICE O/P EST LOW 20 MIN: CPT | Performed by: FAMILY MEDICINE

## 2020-10-28 RX ORDER — AMOXICILLIN AND CLAVULANATE POTASSIUM 875; 125 MG/1; MG/1
1 TABLET, FILM COATED ORAL EVERY 12 HOURS SCHEDULED
Qty: 14 TABLET | Refills: 0 | Status: SHIPPED | OUTPATIENT
Start: 2020-10-28 | End: 2020-11-04

## 2020-10-28 RX ORDER — LORATADINE 10 MG/1
10 TABLET ORAL DAILY
Qty: 90 TABLET | Refills: 0 | Status: SHIPPED | OUTPATIENT
Start: 2020-10-28 | End: 2020-11-12 | Stop reason: SDUPTHER

## 2020-11-12 DIAGNOSIS — R56.9 SEIZURES (HCC): ICD-10-CM

## 2020-11-12 DIAGNOSIS — E78.1 HIGH BLOOD TRIGLYCERIDES: ICD-10-CM

## 2020-11-12 DIAGNOSIS — J30.9 ALLERGIC RHINITIS, UNSPECIFIED SEASONALITY, UNSPECIFIED TRIGGER: ICD-10-CM

## 2020-11-12 DIAGNOSIS — I10 HTN (HYPERTENSION): ICD-10-CM

## 2020-11-12 RX ORDER — LEVETIRACETAM 750 MG/1
750 TABLET ORAL 2 TIMES DAILY
Qty: 60 TABLET | Refills: 5 | Status: SHIPPED | OUTPATIENT
Start: 2020-11-12 | End: 2021-10-28

## 2020-11-12 RX ORDER — LORATADINE 10 MG/1
10 TABLET ORAL DAILY
Qty: 90 TABLET | Refills: 0 | Status: SHIPPED | OUTPATIENT
Start: 2020-11-12 | End: 2021-12-20 | Stop reason: ALTCHOICE

## 2020-11-12 RX ORDER — AMLODIPINE BESYLATE 2.5 MG/1
2.5 TABLET ORAL DAILY
Qty: 90 TABLET | Refills: 0 | Status: SHIPPED | OUTPATIENT
Start: 2020-11-12 | End: 2020-12-15

## 2020-11-12 RX ORDER — FENOFIBRATE 145 MG/1
145 TABLET, COATED ORAL DAILY
Qty: 30 TABLET | Refills: 2 | Status: SHIPPED | OUTPATIENT
Start: 2020-11-12 | End: 2021-12-20 | Stop reason: ALTCHOICE

## 2020-12-15 DIAGNOSIS — I10 HTN (HYPERTENSION): ICD-10-CM

## 2020-12-15 RX ORDER — AMLODIPINE BESYLATE 2.5 MG/1
TABLET ORAL
Qty: 90 TABLET | Refills: 0 | Status: SHIPPED | OUTPATIENT
Start: 2020-12-15 | End: 2021-05-26

## 2021-04-05 ENCOUNTER — APPOINTMENT (EMERGENCY)
Dept: NON INVASIVE DIAGNOSTICS | Facility: HOSPITAL | Age: 41
End: 2021-04-05

## 2021-04-05 ENCOUNTER — HOSPITAL ENCOUNTER (EMERGENCY)
Facility: HOSPITAL | Age: 41
Discharge: HOME | End: 2021-04-05
Attending: EMERGENCY MEDICINE | Admitting: EMERGENCY MEDICINE

## 2021-04-05 VITALS
HEART RATE: 76 BPM | TEMPERATURE: 99.4 F | OXYGEN SATURATION: 95 % | RESPIRATION RATE: 16 BRPM | WEIGHT: 210.1 LBS | SYSTOLIC BLOOD PRESSURE: 146 MMHG | BODY MASS INDEX: 30.15 KG/M2 | DIASTOLIC BLOOD PRESSURE: 87 MMHG

## 2021-04-05 DIAGNOSIS — R60.0 EDEMA OF LEFT LOWER EXTREMITY: ICD-10-CM

## 2021-04-05 DIAGNOSIS — L03.90 CELLULITIS: Primary | ICD-10-CM

## 2021-04-05 DIAGNOSIS — S81.802A NON-HEALING WOUND OF LEFT LOWER EXTREMITY: ICD-10-CM

## 2021-04-05 LAB
ALBUMIN SERPL BCP-MCNC: 3.2 G/DL (ref 3.5–5)
ALP SERPL-CCNC: 80 U/L (ref 46–116)
ALT SERPL W P-5'-P-CCNC: 32 U/L (ref 12–78)
ANION GAP SERPL CALCULATED.3IONS-SCNC: 12 MMOL/L (ref 4–13)
APTT PPP: 26 SECONDS (ref 23–37)
AST SERPL W P-5'-P-CCNC: 20 U/L (ref 5–45)
ATRIAL RATE: 76 BPM
BASOPHILS # BLD AUTO: 0.11 THOUSANDS/ΜL (ref 0–0.1)
BASOPHILS NFR BLD AUTO: 1 % (ref 0–1)
BILIRUB SERPL-MCNC: 0.48 MG/DL (ref 0.2–1)
BUN SERPL-MCNC: 8 MG/DL (ref 5–25)
CALCIUM ALBUM COR SERPL-MCNC: 9.3 MG/DL (ref 8.3–10.1)
CALCIUM SERPL-MCNC: 8.7 MG/DL (ref 8.3–10.1)
CHLORIDE SERPL-SCNC: 105 MMOL/L (ref 100–108)
CO2 SERPL-SCNC: 24 MMOL/L (ref 21–32)
CREAT SERPL-MCNC: 0.92 MG/DL (ref 0.6–1.3)
EOSINOPHIL # BLD AUTO: 0.42 THOUSAND/ΜL (ref 0–0.61)
EOSINOPHIL NFR BLD AUTO: 4 % (ref 0–6)
ERYTHROCYTE [DISTWIDTH] IN BLOOD BY AUTOMATED COUNT: 12.3 % (ref 11.6–15.1)
GFR SERPL CREATININE-BSD FRML MDRD: 102 ML/MIN/1.73SQ M
GLUCOSE SERPL-MCNC: 127 MG/DL (ref 65–140)
HCT VFR BLD AUTO: 48.3 % (ref 36.5–49.3)
HGB BLD-MCNC: 16.7 G/DL (ref 12–17)
IMM GRANULOCYTES # BLD AUTO: 0.04 THOUSAND/UL (ref 0–0.2)
IMM GRANULOCYTES NFR BLD AUTO: 0 % (ref 0–2)
INR PPP: 1.03 (ref 0.84–1.19)
LACTATE SERPL-SCNC: 1.9 MMOL/L (ref 0.5–2)
LYMPHOCYTES # BLD AUTO: 1.04 THOUSANDS/ΜL (ref 0.6–4.47)
LYMPHOCYTES NFR BLD AUTO: 10 % (ref 14–44)
MCH RBC QN AUTO: 32.5 PG (ref 26.8–34.3)
MCHC RBC AUTO-ENTMCNC: 34.6 G/DL (ref 31.4–37.4)
MCV RBC AUTO: 94 FL (ref 82–98)
MONOCYTES # BLD AUTO: 0.65 THOUSAND/ΜL (ref 0.17–1.22)
MONOCYTES NFR BLD AUTO: 6 % (ref 4–12)
NEUTROPHILS # BLD AUTO: 8.54 THOUSANDS/ΜL (ref 1.85–7.62)
NEUTS SEG NFR BLD AUTO: 79 % (ref 43–75)
NRBC BLD AUTO-RTO: 0 /100 WBCS
NT-PROBNP SERPL-MCNC: 42 PG/ML
P AXIS: 52 DEGREES
PLATELET # BLD AUTO: 291 THOUSANDS/UL (ref 149–390)
PMV BLD AUTO: 9 FL (ref 8.9–12.7)
POTASSIUM SERPL-SCNC: 4 MMOL/L (ref 3.5–5.3)
PR INTERVAL: 154 MS
PROCALCITONIN SERPL-MCNC: <0.05 NG/ML
PROT SERPL-MCNC: 7.7 G/DL (ref 6.4–8.2)
PROTHROMBIN TIME: 13.3 SECONDS (ref 11.6–14.5)
QRS AXIS: 72 DEGREES
QRSD INTERVAL: 100 MS
QT INTERVAL: 384 MS
QTC INTERVAL: 432 MS
RBC # BLD AUTO: 5.14 MILLION/UL (ref 3.88–5.62)
SODIUM SERPL-SCNC: 141 MMOL/L (ref 136–145)
T WAVE AXIS: 51 DEGREES
VENTRICULAR RATE: 76 BPM
WBC # BLD AUTO: 10.8 THOUSAND/UL (ref 4.31–10.16)

## 2021-04-05 PROCEDURE — 83605 ASSAY OF LACTIC ACID: CPT | Performed by: EMERGENCY MEDICINE

## 2021-04-05 PROCEDURE — 93010 ELECTROCARDIOGRAM REPORT: CPT | Performed by: INTERNAL MEDICINE

## 2021-04-05 PROCEDURE — 99284 EMERGENCY DEPT VISIT MOD MDM: CPT | Performed by: EMERGENCY MEDICINE

## 2021-04-05 PROCEDURE — 80053 COMPREHEN METABOLIC PANEL: CPT | Performed by: EMERGENCY MEDICINE

## 2021-04-05 PROCEDURE — 93971 EXTREMITY STUDY: CPT

## 2021-04-05 PROCEDURE — 93971 EXTREMITY STUDY: CPT | Performed by: SURGERY

## 2021-04-05 PROCEDURE — 87040 BLOOD CULTURE FOR BACTERIA: CPT | Performed by: EMERGENCY MEDICINE

## 2021-04-05 PROCEDURE — 36415 COLL VENOUS BLD VENIPUNCTURE: CPT | Performed by: EMERGENCY MEDICINE

## 2021-04-05 PROCEDURE — 93005 ELECTROCARDIOGRAM TRACING: CPT

## 2021-04-05 PROCEDURE — 84145 PROCALCITONIN (PCT): CPT | Performed by: EMERGENCY MEDICINE

## 2021-04-05 PROCEDURE — 83880 ASSAY OF NATRIURETIC PEPTIDE: CPT | Performed by: EMERGENCY MEDICINE

## 2021-04-05 PROCEDURE — 99284 EMERGENCY DEPT VISIT MOD MDM: CPT

## 2021-04-05 PROCEDURE — 85730 THROMBOPLASTIN TIME PARTIAL: CPT | Performed by: EMERGENCY MEDICINE

## 2021-04-05 PROCEDURE — 85025 COMPLETE CBC W/AUTO DIFF WBC: CPT | Performed by: EMERGENCY MEDICINE

## 2021-04-05 PROCEDURE — 85610 PROTHROMBIN TIME: CPT | Performed by: EMERGENCY MEDICINE

## 2021-04-05 RX ORDER — DOXYCYCLINE HYCLATE 100 MG/1
100 CAPSULE ORAL ONCE
Status: COMPLETED | OUTPATIENT
Start: 2021-04-05 | End: 2021-04-05

## 2021-04-05 RX ORDER — DOXYCYCLINE HYCLATE 100 MG/1
100 CAPSULE ORAL EVERY 12 HOURS SCHEDULED
Qty: 20 CAPSULE | Refills: 0 | Status: SHIPPED | OUTPATIENT
Start: 2021-04-05 | End: 2021-04-15

## 2021-04-05 RX ORDER — ACETAMINOPHEN 325 MG/1
650 TABLET ORAL ONCE
Status: COMPLETED | OUTPATIENT
Start: 2021-04-05 | End: 2021-04-05

## 2021-04-05 RX ORDER — GABAPENTIN 100 MG/1
100 CAPSULE ORAL ONCE
Status: COMPLETED | OUTPATIENT
Start: 2021-04-05 | End: 2021-04-05

## 2021-04-05 RX ORDER — SACCHAROMYCES BOULARDII 250 MG
250 CAPSULE ORAL 2 TIMES DAILY
Qty: 20 CAPSULE | Refills: 0 | Status: SHIPPED | OUTPATIENT
Start: 2021-04-05 | End: 2021-04-15

## 2021-04-05 RX ORDER — IBUPROFEN 800 MG/1
800 TABLET ORAL EVERY 8 HOURS PRN
Qty: 21 TABLET | Refills: 0 | Status: SHIPPED | OUTPATIENT
Start: 2021-04-05

## 2021-04-05 RX ADMIN — GABAPENTIN 100 MG: 100 CAPSULE ORAL at 09:03

## 2021-04-05 RX ADMIN — DOXYCYCLINE HYCLATE 100 MG: 100 CAPSULE ORAL at 10:50

## 2021-04-05 RX ADMIN — ACETAMINOPHEN 650 MG: 325 TABLET, FILM COATED ORAL at 09:03

## 2021-04-05 NOTE — DISCHARGE INSTRUCTIONS
Complete entire course of doxycycline to treat current cellulitis  You may take probiotic 1-2 hours after each antibiotic dose to help minimize gastrointestinal side effects of the antibiotic  Take ibuprofen or Tylenol for pain as needed  Elevate your left leg as much as possible to help with the swelling  Once the infection start improving, you may benefit from compression stockings to help with the swelling in your left leg  Follow up with the Foot and Ankle specialist or the wound care specialist for further care of your left leg wound  Return to emergency department if redness is worsening or there are red streaks traveling up your leg    Seek medical attention if pain is worsening, you are spiking fevers, or as needed

## 2021-04-05 NOTE — ED PROVIDER NOTES
EMERGENCY DEPARTMENT ENCOUNTER NOTE    This note has been generated using a voice recognition software  There may be typographic, grammatic, or word substitution errors that have escaped editorial review  ? CHIEF COMPLAINT  Chief Complaint   Patient presents with    Leg Swelling     pt states he has had redness and swelling in the left leg for the past 3-4 months  pt states he believes he has an infection       HPI  Mady Shaw is a 41 y o  male with PMH of GERD, hypertension, left lower extremity edema with prior wound presenting with swelling of the left leg, redness, wound, and pain  Patient notes left leg swelling over the past several months and has previously had a left shin wound dating back as far as 2019  That wound has healed, however, patient now has a dime-sized wound below this that has been present for at least several weeks and which usually has some degree of yellowish drainage  Over the past 2 days, patient has had worsening pain around the wound and redness  He has not had any red streaks traveling up the leg, he has not had any fevers or chills  He reports that the pain is burning and stinging in sensation  Patient also notes that with a swelling of his left leg, he has had clear fluid leaking from where the skin broke due to the swelling  No history of diabetes  Patient reports that he does not wear compression stockings  He also reports that he has previously seen ankle and foot specialist for the care of his 1st wound that has now healed and is quite happy with the care he received  Patient is on his feet a lot as he is a   He denies trauma to the leg      REVIEW OF SYSTEMS  Constitutional: ?Denies fevers, chills  CV: No chest pain , chronic left leg swelling as above  Resp: No  shortness of breath or coughing  GI: No abdominal pain, vomiting, or diarrhea  : No urinary frequency, urgency, or hematuria  Skin:  As per HPI  Endocrine: No history of diabetes  Neuro: No headache, no numbness or tingling in either extremity, there are burning shooting pains around the left leg wound  Ten systems were reviewed otherwise were unremarkable    PAST MEDICAL HISTORY  Past Medical History:   Diagnosis Date    Alcohol abuse     Asthma     GERD (gastroesophageal reflux disease)     w/o esophagitis    History of cardiovascular stress test 06/18/2018    Exercise stress echo - Frequent PVCs, brief runs of NSVT  Echo portion negative for ischemia   Hx of echocardiogram 06/18/2018    negative for ischemia    Hypertension     PVC's (premature ventricular contractions)     Seizures ([facility] [address] )     Early spring / late winter of 2018 was last and only seizure       SURGICAL HISTORY  Past Surgical History:   Procedure Laterality Date    CARPAL TUNNEL RELEASE Bilateral     CYST REMOVAL         FAMILY HISTORY  Family History   Problem Relation Age of Onset    Stroke Father     Heart attack Father     Coronary artery disease Family         Less than 60 yrs of age   Mavis Cousin Seizures Neg Hx         CURRENT MEDICATIONS  No current facility-administered medications on file prior to encounter        Current Outpatient Medications on File Prior to Encounter   Medication Sig    amLODIPine (NORVASC) 2 5 mg tablet Take 1 tablet by mouth once daily    fenofibrate (TRICOR) 145 mg tablet Take 1 tablet (145 mg total) by mouth daily    levETIRAcetam (KEPPRA) 750 mg tablet Take 1 tablet (750 mg total) by mouth 2 (two) times a day    loratadine (CLARITIN) 10 mg tablet Take 1 tablet (10 mg total) by mouth daily       ALLERGIES  Allergies   Allergen Reactions    Nonoxynol 9 Other (See Comments)     Childhood reaction - pt unsure of reaction       SOCIAL HISTORY  Social History     Socioeconomic History    Marital status: /Civil Union     Spouse name: None    Number of children: None    Years of education: None    Highest education level: None   Occupational History    None   Social Needs    Financial resource strain: None    Food insecurity     Worry: None     Inability: None    Transportation needs     Medical: None     Non-medical: None   Tobacco Use    Smoking status: Current Every Day Smoker     Packs/day: 1 50     Types: Cigarettes    Smokeless tobacco: Never Used   Substance and Sexual Activity    Alcohol use: Yes     Comment: socially    Drug use: No    Sexual activity: None   Lifestyle    Physical activity     Days per week: None     Minutes per session: None    Stress: None   Relationships    Social connections     Talks on phone: None     Gets together: None     Attends [employer] service: None     Active member of club or organization: None     Attends meetings of clubs or organizations: None     Relationship status: None    Intimate partner violence     Fear of current or ex partner: None     Emotionally abused: None     Physically abused: None     Forced sexual activity: None   Other Topics Concern    None   Social History Narrative    Lives with in [place], with wife, four kids       PHYSICAL EXAM    /86 (BP Location: Left arm)   Pulse 88   Temp 99 4 °F (37 4 °C) (Temporal)   Resp 16   Wt 95 3 kg (210 lb 1 6 oz)   SpO2 92%   BMI 30 15 kg/m²   Vital signs and nursing notes reviewed    CONSTITUTIONAL: male appearing stated age resting in bed, in no acute distress  HEENT: atraumatic, normocephalic  Sclera anicteric, conjunctiva are not injected  Moist oral mucosa  CARDIOVASCULAR/CHEST: RRR, no M/R/G  2+ radial pulses  PULMONARY: Breathing comfortably on RA  Breath sounds are equal and clear to auscultation  ABDOMEN: non-distended  MSK: moves all extremities  Examination of right lower extremity reveals no peripheral edema, no chronic venous stasis changes and leg is warm and well perfused  Examination of left lower extremity reveals left lower extremity pitting edema below the knee  There are chronic venous stasis changes to anterior shin    There is a healed wound/ulcer to the mid tibia  There is a dime-sized ulcer about 10 cm and medial to the 1st ulcer  This wound has granulation tissue and some yellowish drainage  There is redness surrounding the wound, particularly over the medial aspect of the wound  There is warm to touch  There is some pain with palpation of this area however it is not out of proportion to the exam   There is no crepitus  2+ dorsalis pedis and posterior tibial pulses on the left lower extremity  There are no red streaks traveling up the leg  NEURO: Awake, alert, and oriented x 3  Face symmetric  Moves all extremities spontaneously  No focal neurologic deficits  SKIN: Warm, appears well-perfused  MENTAL STATUS: Normal affect     Media Information       Document Information    Clinical Image - Mobile Device   Left lower extremity wound   04/05/2021 10:21   Attached To: Hospital Encounter on 4/5/21   Source Information    Torri Jung MD  [facility]       ? LABS AND TESTS    Results Reviewed     Procedure Component Value Units Date/Time    Blood culture #1 [[id number]] Collected: 04/05/21 0843    Lab Status: Preliminary result Specimen: Blood from Arm, Right Updated: 04/06/21 1201     Blood Culture No Growth at 24 hrs  Blood culture #2 [[id number]] Collected: 04/05/21 0908    Lab Status: Preliminary result Specimen: Blood from Hand, Left Updated: 04/05/21 1701     Blood Culture Received in Microbiology Lab  Culture in Progress      Procalcitonin with AM Reflex [[id number]]  (Normal) Collected: 04/05/21 0843    Lab Status: Final result Specimen: Blood from Arm, Right Updated: 04/05/21 1221     Procalcitonin <0 05 ng/ml     NT-BNP PRO [[id number]]  (Normal) Collected: 04/05/21 0843    Lab Status: Final result Specimen: Blood from Arm, Right Updated: 04/05/21 0931     NT-proBNP 42 pg/mL     Comprehensive metabolic panel [[id number]]  (Abnormal) Collected: 04/05/21 0843    Lab Status: Final result Specimen: Blood from Arm, Right Updated: 04/05/21 0918     Sodium 141 mmol/L      Potassium 4 0 mmol/L      Chloride 105 mmol/L      CO2 24 mmol/L      ANION GAP 12 mmol/L      BUN 8 mg/dL      Creatinine 0 92 mg/dL      Glucose 127 mg/dL      Calcium 8 7 mg/dL      Corrected Calcium 9 3 mg/dL      AST 20 U/L      ALT 32 U/L      Alkaline Phosphatase 80 U/L      Total Protein 7 7 g/dL      Albumin 3 2 g/dL      Total Bilirubin 0 48 mg/dL      eGFR 102 ml/min/1 73sq m     Narrative:      [place] guidelines for Chronic Kidney Disease (CKD):     Stage 1 with normal or high GFR (GFR > 90 mL/min/1 73 square meters)    Stage 2 Mild CKD (GFR = 60-89 mL/min/1 73 square meters)    Stage 3A Moderate CKD (GFR = 45-59 mL/min/1 73 square meters)    Stage 3B Moderate CKD (GFR = 30-44 mL/min/1 73 square meters)    Stage 4 Severe CKD (GFR = 15-29 mL/min/1 73 square meters)    Stage 5 End Stage CKD (GFR <15 mL/min/1 73 square meters)  Note: GFR calculation is accurate only with a steady state creatinine    Lactic acid [[id number]]  (Normal) Collected: 04/05/21 0843    Lab Status: Final result Specimen: Blood from Arm, Right Updated: 04/05/21 0909     LACTIC ACID 1 9 mmol/L     Narrative:      Result may be elevated if tourniquet was used during collection      Protime-INR [[id number]]  (Normal) Collected: 04/05/21 0843    Lab Status: Final result Specimen: Blood from Arm, Right Updated: 04/05/21 0903     Protime 13 3 seconds      INR 1 03    APTT [[id number]]  (Normal) Collected: 04/05/21 0843    Lab Status: Final result Specimen: Blood from Arm, Right Updated: 04/05/21 0903     PTT 26 seconds     CBC and differential [[id number]]  (Abnormal) Collected: 04/05/21 0843    Lab Status: Final result Specimen: Blood from Arm, Right Updated: 04/05/21 0858     WBC 10 80 Thousand/uL      RBC 5 14 Million/uL      Hemoglobin 16 7 g/dL      Hematocrit 48 3 %      MCV 94 fL      MCH 32 5 pg      MCHC 34 6 g/dL      RDW 12 3 %      MPV 9 0 fL      Platelets 291 Thousands/uL      nRBC 0 /100 WBCs      Neutrophils Relative 79 %      Immat GRANS % 0 %      Lymphocytes Relative 10 %      Monocytes Relative 6 %      Eosinophils Relative 4 %      Basophils Relative 1 %      Neutrophils Absolute 8 54 Thousands/µL      Immature Grans Absolute 0 04 Thousand/uL      Lymphocytes Absolute 1 04 Thousands/µL      Monocytes Absolute 0 65 Thousand/µL      Eosinophils Absolute 0 42 Thousand/µL      Basophils Absolute 0 11 Thousands/µL           VAS lower limb venous duplex study, unilateral/limited   Final Result by Nat Morrow MD (04/05 1302)          ED COURSE & MEDICAL DECISION MAKING  ECG 12 Lead Documentation Only    Date/Time: 4/5/2021 9:07 AM  Performed by: Rickey Ryan MD  Authorized by: Rickey Ryan MD     Comments:      Normal sinus rhythm, ventricular rate 76, NY interval 154,  , normal axis, no ST/T-wave changes to suggest ischemia, no STEMI  Normal EKG  Medications   gabapentin (NEURONTIN) capsule 100 mg (100 mg Oral Given 4/5/21 0903)   acetaminophen (TYLENOL) tablet 650 mg (650 mg Oral Given 4/5/21 0903)   doxycycline hyclate (VIBRAMYCIN) capsule 100 mg (100 mg Oral Given 4/5/21 80)     68-year-old male presenting with left lower extremity chronic edema, venous stasis changes, as well as wound with surrounding redness that is painful to touch  Vital signs reviewed, afebrile, hypertensive, within normal limits otherwise  Differential diagnosis includes lymphedema versus chronic venous stasis possibly due to occult trauma versus deep vein valve insufficiency with resultant venous stasis ulcer  Physical exam is consistent with cellulitis around the wound  There are no fluctuant areas to suggest abscess  There is no pain out of proportion or purple discoloration to suggest necrotizing soft tissue infection  Patient is nontoxic appearing      Given left leg swelling, I will pursue left lower extremity venous ultrasound to rule out DVT  I will also obtain labs to ensure there is no systemic process as a result of current skin/soft tissue infection to left lower extremity  Given burning pain, Tylenol and a dose of gabapentin administered  Patient is not on gabapentin chronically  Affected he reports burning and stinging type pain makes me suspect some degree of nerve irritation/neuropathic type pain  Labs reveal CMP with baseline renal function, normal lactate, CBC is with mild leukocytosis of 10 8 with neutrophil predominance, no bandemia  Procalcitonin pending  Blood cultures sent  ED Course as of Apr 06 1452   Mon Apr 05, 2021   [id number] Vascular tech at bedside      1013 Per [facility] Tech, LLE venous ultrasound is negative for DVT  First dose of doxycycline administered in the emergency department, I will continue patient on a course of doxycycline for current infection, patient may have ibuprofen for pain  In discussion with the patient, will prescribe probiotic as well  Recommend follow-up with his ankle and foot specialist versus with wound care  Once infection clears up somewhat, recommend wearing below the knee compression stockings to help with edema and blood return      MDM  Number of Diagnoses or Management Options  Cellulitis: new and requires workup  Edema of left lower extremity: established and worsening  Non-healing wound of left lower extremity: established and worsening     Amount and/or Complexity of Data Reviewed  Clinical lab tests: ordered and reviewed  Tests in the radiology section of CPT®: ordered and reviewed        CLINICAL IMPRESSION  Final diagnoses:   Edema of left lower extremity   Cellulitis   Non-healing wound of left lower extremity       DISPOSITION  Time reflects when diagnosis was documented in both MDM as applicable and the Disposition within this note     Time User Action Codes Description Comment    4/5/2021 10:14 AM Neftali Carter Add [R60 0] Edema of left lower extremity 4/5/2021 10:14 AM Lorre Ripa Add [L03 90] Cellulitis     4/5/2021 10:15 AM Lorre Ripa Add [S81 802A] Non-healing wound of left lower extremity     4/5/2021 10:15 AM Lorre Ripa Modify [R60 0] Edema of left lower extremity     4/5/2021 10:15 AM Lorre Ripa Modify [L03 90] Cellulitis       ED Disposition     ED Disposition Condition Date/Time Comment    Discharge Stable Mon Apr 5, 2021 10:14 AM King Norris discharge to home/self care              Follow-up Information     Follow up With Specialties Details Why Contact Info Additional Information    [address] [facility] [place] Call  Please follow up with the wound care specialists for your chronic lymphedema and left leg wound, Emergency Room Follow-up [address] [facility], [address] [facility], [facility], [address] [facility]  [phone]    [facility] Emergency Department Emergency Medicine Go to  As needed, If symptoms worsen [place] [id number]  [address] [place] [address] Emergency Department, [address], [address]   Discharge Medication List as of 4/5/2021 10:37 AM      START taking these medications    Details   doxycycline hyclate (VIBRAMYCIN) 100 mg capsule Take 1 capsule (100 mg total) by mouth every 12 (twelve) hours for 10 days, Starting Mon 4/5/2021, Until u 4/15/2021, Normal      ibuprofen (MOTRIN) 800 mg tablet Take 1 tablet (800 mg total) by mouth every 8 (eight) hours as needed for moderate pain, Starting Mon 4/5/2021, Normal      saccharomyces boulardii (FLORASTOR) 250 mg capsule Take 1 capsule (250 mg total) by mouth 2 (two) times a day for 10 days, Starting Mon 4/5/2021, Until Thu 4/15/2021, Normal         CONTINUE these medications which have NOT CHANGED    Details   amLODIPine (NORVASC) 2 5 mg tablet Take 1 tablet by mouth once daily, Normal levETIRAcetam (KEPPRA) 750 mg tablet Take 1 tablet (750 mg total) by mouth 2 (two) times a day, Starting Thu 11/12/2020, Normal      fenofibrate (TRICOR) 145 mg tablet Take 1 tablet (145 mg total) by mouth daily, Starting Thu 11/12/2020, Until Wed 2/10/2021, Normal      loratadine (CLARITIN) 10 mg tablet Take 1 tablet (10 mg total) by mouth daily, Starting Thu 11/12/2020, Until Wed 2/10/2021, Normal             MD Alex Chaudhari MD  04/06/21 [id number]

## 2021-04-05 NOTE — ED NOTES
Patient reports that he has not seen wound management since 11/2019 and the wound has been present since that time  Patient presents with the wound bandaged and light serous drainage        Pj Blankenship RN  04/05/21 1951

## 2021-04-10 LAB
BACTERIA BLD CULT: NORMAL
BACTERIA BLD CULT: NORMAL

## 2021-04-19 ENCOUNTER — OFFICE VISIT (OUTPATIENT)
Dept: WOUND CARE | Facility: CLINIC | Age: 41
End: 2021-04-19

## 2021-04-19 VITALS
WEIGHT: 207 LBS | BODY MASS INDEX: 29.63 KG/M2 | HEART RATE: 88 BPM | RESPIRATION RATE: 20 BRPM | HEIGHT: 70 IN | TEMPERATURE: 96 F | DIASTOLIC BLOOD PRESSURE: 70 MMHG | SYSTOLIC BLOOD PRESSURE: 124 MMHG

## 2021-04-19 DIAGNOSIS — L97.929 VENOUS HYPERTENSION, CHRONIC, WITH ULCER AND INFLAMMATION, LEFT (HCC): Primary | ICD-10-CM

## 2021-04-19 DIAGNOSIS — I87.332 VENOUS HYPERTENSION, CHRONIC, WITH ULCER AND INFLAMMATION, LEFT (HCC): Primary | ICD-10-CM

## 2021-04-19 PROCEDURE — 99214 OFFICE O/P EST MOD 30 MIN: CPT | Performed by: SURGERY

## 2021-04-19 PROCEDURE — 99213 OFFICE O/P EST LOW 20 MIN: CPT | Performed by: SURGERY

## 2021-04-19 PROCEDURE — 87186 SC STD MICRODIL/AGAR DIL: CPT | Performed by: SURGERY

## 2021-04-19 PROCEDURE — 87070 CULTURE OTHR SPECIMN AEROBIC: CPT | Performed by: SURGERY

## 2021-04-19 PROCEDURE — 97597 DBRDMT OPN WND 1ST 20 CM/<: CPT | Performed by: SURGERY

## 2021-04-19 PROCEDURE — 87205 SMEAR GRAM STAIN: CPT | Performed by: SURGERY

## 2021-04-19 PROCEDURE — 87077 CULTURE AEROBIC IDENTIFY: CPT | Performed by: SURGERY

## 2021-04-19 RX ORDER — LIDOCAINE 40 MG/G
CREAM TOPICAL ONCE
Status: COMPLETED | OUTPATIENT
Start: 2021-04-19 | End: 2021-04-19

## 2021-04-19 RX ADMIN — LIDOCAINE 1 APPLICATION: 40 CREAM TOPICAL at 09:09

## 2021-04-19 NOTE — ASSESSMENT & PLAN NOTE
Venous stasis ulcer of left lower extremity with surrounding inflammation  Resolving cellulitis  Patient completed course of doxycycline  Selective debridement completed  Unclear if antibiotics were truly effective  Patient with history of Pseudomonas in the past   Wound cultured  Due to insurance issues we will start dermagran  With a Tubigrip  We will forego a wrap at this time this require patient to return weekly and given no insurance is be rather ex costly  Furthermore he would  Benefit from a venous reflux study the left lower extremity as he has had ulcerations in the past however no significant venous stasis disease of the right lower extremity  I wonder if there is a  vein that perhaps could be contributing to this problem  Recent venous duplex study negative  Also has palpable pulses with   Appropriate ANISHA in the office  Patient will follow-up in 2 weeks

## 2021-04-19 NOTE — PROGRESS NOTES
Patient ID: Michael Skaggs is a 41 y o  male Date of Birth 1980     Chief Complaint  Chief Complaint   Patient presents with    New Patient Visit     Left Lower Extremity wound  Patient states wound opened last September or October  He went to the ED 2 weeks ago because he states he could not walk and was treated with antibiotics  Has been applying aquaphor lotion to the area and covering with gauze  Allergies  Nonoxynol 9    Assessment:    Venous hypertension, chronic, with ulcer and inflammation, left (HCC)  Venous stasis ulcer of left lower extremity with surrounding inflammation  Resolving cellulitis  Patient completed course of doxycycline  Selective debridement completed  Unclear if antibiotics were truly effective  Patient with history of Pseudomonas in the past   Wound cultured  Due to insurance issues we will start dermagran  With a Tubigrip  We will forego a wrap at this time this require patient to return weekly and given no insurance is be rather ex costly  Furthermore he would  Benefit from a venous reflux study the left lower extremity as he has had ulcerations in the past however no significant venous stasis disease of the right lower extremity  I wonder if there is a  vein that perhaps could be contributing to this problem  Recent venous duplex study negative  Also has palpable pulses with   Appropriate ANISHA in the office  Patient will follow-up in 2 weeks  Diagnoses and all orders for this visit:    Venous hypertension, chronic, with ulcer and inflammation, left (HCC)  -     lidocaine (LMX) 4 % cream  -     Wound cleansing and dressings; Future  -     Wound culture and Gram stain; Future              Debridement   Wound 04/19/21 Venous Ulcer Pretibial Left    Universal Protocol:  Consent: Verbal consent obtained    Risks and benefits: risks, benefits and alternatives were discussed  Consent given by: patient  Time out: Immediately prior to procedure a "time out" was called to verify the correct patient, procedure, equipment, support staff and site/side marked as required  Patient understanding: patient states understanding of the procedure being performed  Patient consent: the patient's understanding of the procedure matches consent given      Performed by: physician  Debridement type: selective  Pain control: lidocaine 4%  Post-debridement measurements  Length (cm): 1  Width (cm): 1 2  Depth (cm): 0 1  Percent debrided: 100%  Surface Area (cm^2): 1 2  Area debrided (cm^2): 1 2  Volume (cm^3): 0 12  Devitalized tissue debrided: biofilm and slough  Instrument(s) utilized: curette  Bleeding: small  Hemostasis obtained with: pressure  Procedural pain (0-10): 0  Post-procedural pain: 3   Response to treatment: procedure was tolerated well          Plan:     Wound 04/19/21 Venous Ulcer Pretibial Left (Active)   Wound Image Images linked 04/19/21 0803   Wound Description Granulation tissue;Pink;Yellow;Slough 04/19/21 0805   Martha-wound Assessment Excoriated;Erythema 04/19/21 0805   Wound Length (cm) 1 cm 04/19/21 0805   Wound Width (cm) 1 2 cm 04/19/21 0805   Wound Depth (cm) 0 1 cm 04/19/21 0805   Wound Surface Area (cm^2) 1 2 cm^2 04/19/21 0805   Wound Volume (cm^3) 0 12 cm^3 04/19/21 0805   Calculated Wound Volume (cm^3) 0 12 cm^3 04/19/21 0805   Drainage Amount Small 04/19/21 0805   Drainage Description Yellow 04/19/21 0805   Non-staged Wound Description Full thickness 04/19/21 0805   Treatments Cleansed 04/19/21 0805       Wound 04/19/21 Venous Ulcer Pretibial Left (Active)   Date First Assessed/Time First Assessed: 04/19/21 0802   Primary Wound Type: Venous Ulcer  Location: Pretibial  Wound Location Orientation: Left       [REMOVED] Wound 11/20/19 Leg Left; Anterior (Removed)   Resolved Date: 04/19/21  Date First Assessed/Time First Assessed: 11/20/19 1900   Pre-Existing Wound: Yes  Location: Leg  Wound Location Orientation: Left; Anterior  Wound Description (Comments): circular wound on left shin, wound has depth       Subjective:            41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 with known hypertension, GERD, seizures, alcohol abuse, left lower extremity ulceration, presents today in consultation for evaluation of recurrent left lower extremity ulceration  Patient states that the ulceration of the left lower extremity started months ago and slowly got worse  He states that was larger with more pus and became extremely painful and the skin  Became very red  Once he could not take it anymore after he was trying to take care of himself he decided to see the ER and this was a few weeks prior  That point time was started on doxycycline for cellulitis associated infected left lower extremity ulceration  He does state that the left lower extremity is naturally discolored and this happened ever since the initial ulceration few years ago back in 2019  Denies any significant swelling to bilateral extremities  He does admit to pain in the left lower extremity  There is some drainage  He says the drainage was significant is improved significantly since the antibiotic was stopped completed  No nausea vomiting no fevers or chills  No chest pain shortness of breath  No abdominal pain  No other associated symptoms  The following portions of the patient's history were reviewed and updated as appropriate:   He  has a past medical history of Alcohol abuse, Asthma, GERD (gastroesophageal reflux disease), History of cardiovascular stress test (06/18/2018), echocardiogram (06/18/2018), Hypertension, PVC's (premature ventricular contractions), and [facility]ures ([facility]    He   Patient Active Problem List    Diagnosis Date Noted    Venous hypertension, chronic, with ulcer and inflammation, left ([facility] ) 04/19/2021    Alcohol abuse 08/01/2019    Seizures [facility] ) 07/09/2019    Essential hypertension 08/02/2018    GERD without esophagitis 08/02/2018    Alcohol withdrawal seizure without complication [facility])      He  has a past surgical history that includes Cyst Removal and Carpal tunnel release (Bilateral)  His family history includes Coronary artery disease in his family; Diabetes in his paternal grandmother; Heart attack in his father; Stroke in his father  He  reports that he has been smoking cigarettes  He has been smoking about 1 00 pack per day  He has never used smokeless tobacco  He reports current alcohol use of about 6 0 standard drinks of alcohol per week  He reports that he does not use drugs  Current Outpatient Medications   Medication Sig Dispense Refill    amLODIPine (NORVASC) 2 5 mg tablet Take 1 tablet by mouth once daily 90 tablet 0    ibuprofen (MOTRIN) 800 mg tablet Take 1 tablet (800 mg total) by mouth every 8 (eight) hours as needed for moderate pain 21 tablet 0    levETIRAcetam (KEPPRA) 750 mg tablet Take 1 tablet (750 mg total) by mouth 2 (two) times a day 60 tablet 5    fenofibrate (TRICOR) 145 mg tablet Take 1 tablet (145 mg total) by mouth daily (Patient not taking: Reported on 4/19/2021) 30 tablet 2    loratadine (CLARITIN) 10 mg tablet Take 1 tablet (10 mg total) by mouth daily (Patient not taking: Reported on 4/19/2021) 90 tablet 0     No current facility-administered medications for this visit        Current Outpatient Medications on File Prior to Visit   Medication Sig    amLODIPine (NORVASC) 2 5 mg tablet Take 1 tablet by mouth once daily    ibuprofen (MOTRIN) 800 mg tablet Take 1 tablet (800 mg total) by mouth every 8 (eight) hours as needed for moderate pain    levETIRAcetam (KEPPRA) 750 mg tablet Take 1 tablet (750 mg total) by mouth 2 (two) times a day    fenofibrate (TRICOR) 145 mg tablet Take 1 tablet (145 mg total) by mouth daily (Patient not taking: Reported on 4/19/2021)    loratadine (CLARITIN) 10 mg tablet Take 1 tablet (10 mg total) by mouth daily (Patient not taking: Reported on 4/19/2021)     No current facility-administered medications on file prior to visit       Review of Systems        Review systems completed, all negative except as noted above HPI  Objective:       Wound 04/19/21 Venous Ulcer Pretibial Left (Active)   Wound Image Images linked 04/19/21 0803   Wound Description Granulation tissue;Pink;Yellow;Slough 04/19/21 0805   Martha-wound Assessment Excoriated;Erythema 04/19/21 0805   Wound Length (cm) 1 cm 04/19/21 0805   Wound Width (cm) 1 2 cm 04/19/21 0805   Wound Depth (cm) 0 1 cm 04/19/21 0805   Wound Surface Area (cm^2) 1 2 cm^2 04/19/21 0805   Wound Volume (cm^3) 0 12 cm^3 04/19/21 0805   Calculated Wound Volume (cm^3) 0 12 cm^3 04/19/21 0805   Drainage Amount Small 04/19/21 0805   Drainage Description Yellow 04/19/21 0805   Non-staged Wound Description Full thickness 04/19/21 0805   Treatments Cleansed 04/19/21 0805       /70   Pulse 88   Temp (!) 96 °F (35 6 °C)   Resp 20   Ht 5' 10" (1 778 m)   Wt 93 9 kg (207 lb)   BMI 29 70 kg/m²     Physical Exam  Vitals signs reviewed  Constitutional:       General: He is not in acute distress  Appearance: Normal appearance  He is not ill-appearing, toxic-appearing or diaphoretic  HENT:      Head: Normocephalic and atraumatic  Right Ear: External ear normal       Left Ear: External ear normal    Eyes:      General: No scleral icterus  Right eye: No discharge  Left eye: No discharge  Neck:      Musculoskeletal: Normal range of motion  Cardiovascular:      Rate and Rhythm: Normal rate  Pulmonary:      Effort: Pulmonary effort is normal  No respiratory distress  Musculoskeletal: Normal range of motion  General: Tenderness (  Left lower extremity) present  No swelling  Right lower leg: No edema  Left lower leg: Edema present  Skin:     General: Skin is warm  Coloration: Skin is not jaundiced or pale  Findings: Erythema present  No bruising  Comments: What appears to be venous stasis ulcer in the anterior left lower extremity  [facility] noted  Selective debridement completed  Healthy bleeding noted  Surrounding venous stasis changes and associated cellulitis  Neurological:      General: No focal deficit present  Mental Status: He is alert and oriented to person, place, and time  Cranial Nerves: No cranial nerve deficit  Psychiatric:         Mood and Affect: Mood normal          Behavior: Behavior normal          Thought Content: Thought content normal          Judgment: Judgment normal            Wound Instructions:  Orders Placed This Encounter   Procedures    Wound cleansing and dressings     Left Lower Leg  Wash your hands with soap and water  Remove old dressing, discard into plastic bag and place in trash  Cleanse the wound with normal saline prior to applying a clean dressing  Do not use tissue or cotton balls  Do not scrub the wound  Pat dry using gauze  Shower yes  Do not get dressing wet  May purchase cast cover at medical supply company  Apply moisturizing cream  Apply dermagran to the wound  Cover with 4x4 gauze  Secure with mirella and paper tape  Change dressing every other day  This was applied today  Elastic Tubular Stocking    Tubular elastic bandage: Apply from base of toes to behind the knee  Apply in AM, may remove for sleep  Avoid prolonged standing in one place  Elevate leg(s) above the level of the heart when sitting or as much as possible  Standing Status:   Future     Standing Expiration Date:   4/19/2022    Wound culture and Gram stain     Standing Status:   Future     Standing Expiration Date:   4/19/2022     Order Specific Question:   Release to patient through Mychart     Answer:   Immediate        Diagnosis ICD-10-CM Associated Orders   1   Venous hypertension, chronic, with ulcer and inflammation, left (HCC)  I87 [id number] lidocaine (LMX) 4 % cream    [id number] Wound cleansing and dressings     Wound culture and Gram stain

## 2021-04-19 NOTE — PATIENT INSTRUCTIONS
Orders Placed This Encounter   Procedures    Wound cleansing and dressings     Left Lower Leg  Wash your hands with soap and water  Remove old dressing, discard into plastic bag and place in trash  Cleanse the wound with normal saline prior to applying a clean dressing  Do not use tissue or cotton balls  Do not scrub the wound  Pat dry using gauze  Shower yes  Do not get dressing wet  May purchase cast cover at medical supply company  Apply moisturizing cream  Apply dermagran to the wound  Cover with 4x4 gauze  Secure with mirella and paper tape  Change dressing every other day  This was applied today  Elastic Tubular Stocking    Tubular elastic bandage: Apply from base of toes to behind the knee  Apply in AM, may remove for sleep  Avoid prolonged standing in one place  Elevate leg(s) above the level of the heart when sitting or as much as possible       Standing Status:   Future     Standing Expiration Date:   4/19/2022    Wound culture and Gram stain     Standing Status:   Future     Standing Expiration Date:   4/19/2022     Order Specific Question:   Release to patient through [employer]     Answer:   Immediate

## 2021-04-22 LAB
BACTERIA WND AEROBE CULT: ABNORMAL
GRAM STN SPEC: ABNORMAL
GRAM STN SPEC: ABNORMAL

## 2021-04-23 DIAGNOSIS — I87.332 VENOUS HYPERTENSION, CHRONIC, WITH ULCER AND INFLAMMATION, LEFT (HCC): Primary | ICD-10-CM

## 2021-04-23 DIAGNOSIS — L97.929 VENOUS HYPERTENSION, CHRONIC, WITH ULCER AND INFLAMMATION, LEFT (HCC): Primary | ICD-10-CM

## 2021-04-23 DIAGNOSIS — A49.02 INFECTION OF WOUND DUE TO METHICILLIN RESISTANT STAPHYLOCOCCUS AUREUS (MRSA): ICD-10-CM

## 2021-04-23 RX ORDER — SULFAMETHOXAZOLE AND TRIMETHOPRIM 800; 160 MG/1; MG/1
1 TABLET ORAL EVERY 12 HOURS SCHEDULED
Qty: 14 TABLET | Refills: 0 | Status: SHIPPED | OUTPATIENT
Start: 2021-04-23 | End: 2021-04-30

## 2021-04-23 NOTE — PROGRESS NOTES
Patient made aware of his recent culture results of the  Leg ulcer  Was growing MRSA  Previous antibiotic that he was treated with was intermediate  Thus given the persistent wound over the last few weeks of will treat with another course of antibiotics that are sensitive    Patient understands is agreeable to the plan

## 2021-05-03 ENCOUNTER — OFFICE VISIT (OUTPATIENT)
Dept: WOUND CARE | Facility: CLINIC | Age: 41
End: 2021-05-03

## 2021-05-03 VITALS
RESPIRATION RATE: 20 BRPM | SYSTOLIC BLOOD PRESSURE: 128 MMHG | DIASTOLIC BLOOD PRESSURE: 84 MMHG | TEMPERATURE: 96.4 F | HEART RATE: 68 BPM

## 2021-05-03 DIAGNOSIS — L97.929 VENOUS HYPERTENSION, CHRONIC, WITH ULCER AND INFLAMMATION, LEFT (HCC): Primary | ICD-10-CM

## 2021-05-03 DIAGNOSIS — I87.332 VENOUS HYPERTENSION, CHRONIC, WITH ULCER AND INFLAMMATION, LEFT (HCC): Primary | ICD-10-CM

## 2021-05-03 DIAGNOSIS — A49.02 INFECTION OF WOUND DUE TO METHICILLIN RESISTANT STAPHYLOCOCCUS AUREUS (MRSA): ICD-10-CM

## 2021-05-03 PROCEDURE — 97597 DBRDMT OPN WND 1ST 20 CM/<: CPT | Performed by: SURGERY

## 2021-05-03 RX ORDER — LIDOCAINE 40 MG/G
CREAM TOPICAL ONCE
Status: COMPLETED | OUTPATIENT
Start: 2021-05-03 | End: 2021-05-03

## 2021-05-03 RX ADMIN — LIDOCAINE 1 APPLICATION: 40 CREAM TOPICAL at 08:29

## 2021-05-03 NOTE — ASSESSMENT & PLAN NOTE
Wound appears healthy  There was some noted bed of granulation tissue  Healthy bleeding noted  Patient still without any significant medical assistance her insurance  I will continue with mild compression using Tubigrip and dermagran dressing  Follow up 2 weeks  Once patient gets a insurance we will proceed with weekly wraps

## 2021-05-03 NOTE — PATIENT INSTRUCTIONS
Orders Placed This Encounter   Procedures    Wound cleansing and dressings     Left Lower Leg  Wash your hands with soap and water  Remove old dressing, discard into plastic bag and place in trash  Cleanse the wound with normal saline prior to applying a clean dressing  Do not use tissue or cotton balls  Do not scrub the wound  Pat dry using gauze  Shower yes  Do not get dressing wet  May purchase cast cover at medical supply company  Apply moisturizing cream  Apply dermagran to the wound  Cover with 4x4 gauze  Secure with mirella and paper tape  Change dressing every other day  This was applied today      Elastic Tubular Stocking     Tubular elastic bandage: Apply from base of toes to behind the knee  Apply in AM, may remove for sleep      Avoid prolonged standing in one place      Elevate leg(s) above the level of the heart when sitting or as much as possible       Standing Status:   Future     Standing Expiration Date:   5/3/2022

## 2021-05-03 NOTE — PROGRESS NOTES
Patient ID: Azul Gonzalez is a 41 y o  male Date of Birth 1980     Chief Complaint  Chief Complaint   Patient presents with    Follow Up Wound Care Visit     Left Leg Wound       Allergies  Nonoxynol 9    Assessment:    Infection of wound due to methicillin resistant Staphylococcus aureus (MRSA)   Wound overall appears improved  Minimal to no cellulitis noted  No pain  Patient did complete course of antibiotics , Bactrim, after MRSA cultures were completed  Venous hypertension, chronic, with ulcer and inflammation, left (HCC)   Wound appears healthy  There was some noted bed of granulation tissue  Healthy bleeding noted  Patient still without any significant medical assistance her insurance  I will continue with mild compression using Tubigrip and dermagran dressing  Follow up 2 weeks  Once patient gets a insurance we will proceed with weekly wraps  Diagnoses and all orders for this visit:    Venous hypertension, chronic, with ulcer and inflammation, left (HCC)  -     lidocaine (LMX) 4 % cream  -     Wound cleansing and dressings; Future    Infection of wound due to methicillin resistant Staphylococcus aureus (MRSA)              Debridement   Wound 04/19/21 Venous Ulcer Pretibial Left    Universal Protocol:  Consent: Verbal consent obtained  Risks and benefits: risks, benefits and alternatives were discussed  Consent given by: patient  Time out: Immediately prior to procedure a "time out" was called to verify the correct patient, procedure, equipment, support staff and site/side marked as required    Patient understanding: patient states understanding of the procedure being performed  Patient consent: the patient's understanding of the procedure matches consent given  Patient identity confirmed: verbally with patient      Debridement type: selective  Pain control: lidocaine 4%  Post-debridement measurements  Length (cm): 0 9  Width (cm): 1  Depth (cm): 0 1  Percent debrided: 100%  Surface Area (cm^2): 0 9  Area debrided (cm^2): 0 9  Volume (cm^3): 0 09  Devitalized tissue debrided: biofilm, necrotic debris and slough  Instrument(s) utilized: curette  Bleeding: small  Hemostasis obtained with: pressure  Procedural pain (0-10): 0  Post-procedural pain: 0   Response to treatment: procedure was tolerated well          Plan:     Wound 04/19/21 Venous Ulcer Pretibial Left (Active)   Wound Image Images linked 05/03/21 0802   Wound Description Eschar;Brown 05/03/21 0810   Martha-wound Assessment Edema; Excoriated 05/03/21 0810   Wound Length (cm) 0 9 cm 05/03/21 0810   Wound Width (cm) 1 cm 05/03/21 0810   Wound Depth (cm) 0 1 cm 05/03/21 0810   Wound Surface Area (cm^2) 0 9 cm^2 05/03/21 0810   Wound Volume (cm^3) 0 09 cm^3 05/03/21 0810   Calculated Wound Volume (cm^3) 0 09 cm^3 05/03/21 0810   Change in Wound Size % 25 05/03/21 0810   Drainage Amount Scant 05/03/21 0810   Drainage Description Serous 05/03/21 0810   Non-staged Wound Description Not applicable 72/20/40 4236   Treatments Cleansed 05/03/21 0810       Wound 04/19/21 Venous Ulcer Pretibial Left (Active)   Date First Assessed/Time First Assessed: 04/19/21 0802   Primary Wound Type: Venous Ulcer  Location: Pretibial  Wound Location Orientation: Left       [REMOVED] Wound 11/20/19 Leg Left; Anterior (Removed)   Resolved Date: 04/19/21  Date First Assessed/Time First Assessed: 11/20/19 1900   Pre-Existing Wound: Yes  Location: Leg  Wound Location Orientation: Left; Anterior  Wound Description (Comments): circular wound on left shin, wound has depth       Subjective:            41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 with known hypertension, GERD, seizures, alcohol abuse, left lower extremity ulceration, presents today for follow upevaluation and treatment of left lower extremity ulceration  Overall much improved  Denies any left lower extremity pain  No significant redness  No fevers or chills  He does completed antibiotics  Drainage controlled     No nausea vomiting no fevers or chills  No chest pain shortness of breath  No abdominal pain  No other associated symptoms  The following portions of the patient's history were reviewed and updated as appropriate:   He  has a past medical history of Alcohol abuse, Asthma, GERD (gastroesophageal reflux disease), History of cardiovascular stress test (06/18/2018), echocardiogram (06/18/2018), Hypertension, PVC's (premature ventricular contractions), and Seizures ([place] [address] )  He   Patient Active Problem List    Diagnosis Date Noted    Infection of wound due to methicillin resistant Staphylococcus aureus (MRSA) 04/23/2021    Venous hypertension, chronic, with ulcer and inflammation[facility] ) 04/19/2021    Alcohol abuse 08/01/2019    Seizures ([place] [address] ) 07/09/2019    Essential hypertension 08/02/2018    GERD without esophagitis 08/02/2018    Alcohol withdrawal seizure without complication ([place] [address] )      He  has a past surgical history that includes Cyst Removal and Carpal tunnel release (Bilateral)  His family history includes Coronary artery disease in his family; Diabetes in his paternal grandmother; Heart attack in his father; Stroke in his father  He  reports that he has been smoking cigarettes  He has been smoking about 1 00 pack per day  He has never used smokeless tobacco  He reports current alcohol use of about 6 0 standard drinks of alcohol per week  He reports that he does not use drugs    Current Outpatient Medications   Medication Sig Dispense Refill    amLODIPine (NORVASC) 2 5 mg tablet Take 1 tablet by mouth once daily 90 tablet 0    fenofibrate (TRICOR) 145 mg tablet Take 1 tablet (145 mg total) by mouth daily (Patient not taking: Reported on 4/19/2021) 30 tablet 2    ibuprofen (MOTRIN) 800 mg tablet Take 1 tablet (800 mg total) by mouth every 8 (eight) hours as needed for moderate pain 21 tablet 0    levETIRAcetam (KEPPRA) 750 mg tablet Take 1 tablet (750 mg total) by mouth 2 (two) times a day 60 tablet 5    loratadine (CLARITIN) 10 mg tablet Take 1 tablet (10 mg total) by mouth daily (Patient not taking: Reported on 4/19/2021) 90 tablet 0     Current Facility-Administered Medications   Medication Dose Route Frequency Provider Last Rate Last Admin    lidocaine (LMX) 4 % cream   Topical Once Smitha Edward, DO         He is allergic to nonoxynol 9     Review of Systems   Constitutional: Negative for activity change, appetite change, chills, diaphoresis, fatigue, fever and unexpected weight change  Respiratory: Negative for shortness of breath  Cardiovascular: Negative for chest pain  Gastrointestinal: Negative for abdominal pain  Skin: Positive for wound (  Left lower extremity ulcer)  Negative for color change, pallor and rash  Objective:       Wound 04/19/21 Venous Ulcer Pretibial Left (Active)   Wound Image Images linked 05/03/21 0802   Wound Description Eschar;Brown 05/03/21 0810   Martha-wound Assessment Edema; Excoriated 05/03/21 0810   Wound Length (cm) 0 9 cm 05/03/21 0810   Wound Width (cm) 1 cm 05/03/21 0810   Wound Depth (cm) 0 1 cm 05/03/21 0810   Wound Surface Area (cm^2) 0 9 cm^2 05/03/21 0810   Wound Volume (cm^3) 0 09 cm^3 05/03/21 0810   Calculated Wound Volume (cm^3) 0 09 cm^3 05/03/21 0810   Change in Wound Size % 25 05/03/21 0810   Drainage Amount Scant 05/03/21 0810   Drainage Description Serous 05/03/21 0810   Non-staged Wound Description Not applicable 27/29/24 8694   Treatments Cleansed 05/03/21 0810       /84   Pulse 68   Temp (!) 96 4 °F (35 8 °C)   Resp 20     Physical Exam  Vitals signs reviewed  Constitutional:       General: He is not in acute distress  Appearance: Normal appearance  He is not ill-appearing, toxic-appearing or diaphoretic  HENT:      Head: Normocephalic and atraumatic  Right Ear: External ear normal       Left Ear: External ear normal    Eyes:      General: No scleral icterus  Right eye: No discharge           Left eye: No discharge  Neck:      Musculoskeletal: Normal range of motion  Cardiovascular:      Rate and Rhythm: Normal rate  Pulmonary:      Effort: Pulmonary effort is normal  No respiratory distress  Musculoskeletal: Normal range of motion  General: Swelling present  No tenderness, deformity or signs of injury  Left lower leg: Edema present  Skin:     General: Skin is warm and dry  Coloration: Skin is not jaundiced or pale  Findings: Lesion present  Comments: Left lower extremity venous stasis ulcer present  There is some mild dried drainage/scab  This was removed  Selective debridement completed  Healthy granulation tissue with healthy bleeding noted  No significant cellulitis noted  Neurological:      General: No focal deficit present  Mental Status: He is alert and oriented to person, place, and time  Cranial Nerves: No cranial nerve deficit  Psychiatric:         Mood and Affect: Mood normal          Behavior: Behavior normal          Thought Content: Thought content normal          Judgment: Judgment normal            Wound Instructions:  Orders Placed This Encounter   Procedures    Wound cleansing and dressings     Left Lower Leg  Wash your hands with soap and water  Remove old dressing, discard into plastic bag and place in trash  Cleanse the wound with normal saline prior to applying a clean dressing  Do not use tissue or cotton balls  Do not scrub the wound  Pat dry using gauze  Shower yes  Do not get dressing wet  May purchase cast cover at medical supply company  Apply moisturizing cream  Apply dermagran to the wound  Cover with 4x4 gauze  Secure with mirella and paper tape  Change dressing every other day  This was applied today      Elastic Tubular Stocking     Tubular elastic bandage: Apply from base of toes to behind the knee   Apply in AM, may remove for sleep      Avoid prolonged standing in one place      Elevate leg(s) above the level of the heart when sitting or as much as possible  Standing Status:   Future     Standing Expiration Date:   5/3/2022        Diagnosis ICD-10-CM Associated Orders   1  Venous hypertension, chronic, with ulcer and inflammation, left (HCC)  I87 332 lidocaine (LMX) 4 % cream    L97 929 Wound cleansing and dressings   2   Infection of wound due to methicillin resistant Staphylococcus aureus (MRSA)  A49 02

## 2021-05-03 NOTE — ASSESSMENT & PLAN NOTE
Wound overall appears improved  Minimal to no cellulitis noted  No pain  Patient did complete course of antibiotics , Bactrim, after MRSA cultures were completed

## 2021-05-17 ENCOUNTER — OFFICE VISIT (OUTPATIENT)
Dept: WOUND CARE | Facility: CLINIC | Age: 41
End: 2021-05-17

## 2021-05-17 VITALS
DIASTOLIC BLOOD PRESSURE: 78 MMHG | SYSTOLIC BLOOD PRESSURE: 142 MMHG | TEMPERATURE: 96 F | HEART RATE: 88 BPM | RESPIRATION RATE: 20 BRPM

## 2021-05-17 DIAGNOSIS — L97.929 VENOUS HYPERTENSION, CHRONIC, WITH ULCER AND INFLAMMATION, LEFT (HCC): Primary | ICD-10-CM

## 2021-05-17 DIAGNOSIS — I87.332 VENOUS HYPERTENSION, CHRONIC, WITH ULCER AND INFLAMMATION, LEFT (HCC): Primary | ICD-10-CM

## 2021-05-17 PROCEDURE — 97597 DBRDMT OPN WND 1ST 20 CM/<: CPT | Performed by: SURGERY

## 2021-05-17 RX ORDER — LIDOCAINE 40 MG/G
CREAM TOPICAL ONCE
Status: COMPLETED | OUTPATIENT
Start: 2021-05-17 | End: 2021-05-17

## 2021-05-17 RX ADMIN — LIDOCAINE 1 APPLICATION: 40 CREAM TOPICAL at 08:16

## 2021-05-17 NOTE — ASSESSMENT & PLAN NOTE
LLE wound with no significant change in size  There is only bed of granulation tissue noted  Minimal slough which was debrided selectively in the office today  Continue with compression and dermagran to the venous stasis ulcer  Patient would definitely benefit from a wrap of the left lower extremity however patient has currently no insurance and therefore would be extremely expensive for him  He is currently in the midst of trying to obtain medical assistance  For now continue with local wound care  He will follow up with me in the next 2 weeks

## 2021-05-17 NOTE — PATIENT INSTRUCTIONS
Orders Placed This Encounter   Procedures    Wound cleansing and dressings     Left Lower Leg  Wash your hands with soap and water  Remove old dressing, discard into plastic bag and place in trash  Cleanse the wound with normal saline prior to applying a clean dressing  Do not use tissue or cotton balls  Do not scrub the wound  Pat dry using gauze  Shower yes  Do not get dressing wet  May purchase cast cover at medical supply company  Apply moisturizing cream  Apply dermagran to the wound  Cover with 4x4 gauze  Secure with mirella and paper tape  Change dressing every other day  This was applied today      Elastic Tubular Stocking     Tubular elastic bandage: Apply from base of toes to behind the knee  Apply in AM, may remove for sleep      Avoid prolonged standing in one place      Elevate leg(s) above the level of the heart when sitting or as much as possible       Standing Status:   Future     Standing Expiration Date:   5/17/2022

## 2021-05-17 NOTE — PROGRESS NOTES
Patient ID: Taniya Ferraro is a 41 y o  male Date of Birth 1980     Chief Complaint  Chief Complaint   Patient presents with    Follow Up Wound Care Visit     Left leg wound       Allergies  Nonoxynol 9    Assessment:    Venous hypertension, chronic, with ulcer and inflammation, left (HCC)  LLE wound with no significant change in size  There is only bed of granulation tissue noted  Minimal slough which was debrided selectively in the office today  Continue with compression and dermagran to the venous stasis ulcer  Patient would definitely benefit from a wrap of the left lower extremity however patient has currently no insurance and therefore would be extremely expensive for him  He is currently in the midst of trying to obtain medical assistance  For now continue with local wound care  He will follow up with me in the next 2 weeks  Diagnoses and all orders for this visit:    Venous hypertension, chronic, with ulcer and inflammation, left (HCC)  -     lidocaine (LMX) 4 % cream  -     Wound cleansing and dressings; Future              Debridement   Wound 04/19/21 Venous Ulcer Pretibial Left    Universal Protocol:  Consent: Verbal consent obtained  Risks and benefits: risks, benefits and alternatives were discussed  Consent given by: patient  Time out: Immediately prior to procedure a "time out" was called to verify the correct patient, procedure, equipment, support staff and site/side marked as required    Patient understanding: patient states understanding of the procedure being performed  Patient consent: the patient's understanding of the procedure matches consent given  Patient identity confirmed: verbally with patient      Performed by: physician  Debridement type: selective  Pain control: lidocaine 4%  Post-debridement measurements  Length (cm): 0 8  Width (cm): 1 2  Depth (cm): 0 2  Percent debrided: 100%  Surface Area (cm^2): 0 96  Area debrided (cm^2): 0 96  Volume (cm^3): 0 19  Devitalized tissue debrided: biofilm and slough  Instrument(s) utilized: curette  Bleeding: none  Hemostasis obtained with: not applicable  Procedural pain (0-10): 0  Post-procedural pain: 4   Response to treatment: procedure was tolerated well          Plan:     Wound 04/19/21 Venous Ulcer Pretibial Left (Active)   Wound Image Images linked 05/17/21 0803   Wound Description Eschar;Brown;[place] 05/17/21 0807   Martha-wound Assessment Edema; Excoriated 05/17/21 0807   Wound Length (cm) 0 8 cm 05/17/21 0807   Wound Width (cm) 1 2 cm 05/17/21 0807   Wound Depth (cm) 0 2 cm 05/17/21 0807   Wound Surface Area (cm^2) 0 96 cm^2 05/17/21 0807   Wound Volume (cm^3) 0 19 cm^3 05/17/21 0807   Calculated Wound Volume (cm^3) 0 19 cm^3 05/17/21 0807   Change in Wound Size % -58 33 05/17/21 0807   Drainage Amount Scant 05/17/21 0807   Drainage Description Serous 05/17/21 0807   Non-staged Wound Description Full thickness 05/17/21 0807   Treatments Cleansed 05/17/21 0807       Wound 04/19/21 Venous Ulcer Pretibial Left (Active)   Date First Assessed/Time First Assessed: 04/19/21 0802   Primary Wound Type: Venous Ulcer  Location: Pretibial  Wound Location Orientation: Left       [REMOVED] Wound 11/20/19 Leg Left; Anterior (Removed)   Resolved Date: 04/19/21  Date First Assessed/Time First Assessed: 11/20/19 1900   Pre-Existing Wound: Yes  Location: Leg  Wound Location Orientation: Left; Anterior  Wound Description (Comments): circular wound on left shin, wound has depth       Subjective:           41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 with known hypertension, GERD, seizures, alcohol abuse, left lower extremity ulceration, presents today for follow upevaluation and treatment of left lower extremity ulceration  Denies any left lower extremity pain  Minimal drainage  Patient had an eventful weekend and thus was not keeping his compression on and allowing the wound air out  No fevers or chills  He does completed antibiotics  Drainage controlled  No nausea vomiting no fevers or chills  No chest pain shortness of breath  No other associated symptoms  The following portions of the patient's history were reviewed and updated as appropriate:   He  has a past medical history of Alcohol abuse, Asthma, GERD (gastroesophageal reflux disease), History of cardiovascular stress test (06/18/2018), echocardiogram (06/18/2018), Hypertension, PVC's (premature ventricular contractions), and Seizures ([place] [address] )  He   Patient Active Problem List    Diagnosis Date Noted    Infection of wound due to methicillin resistant Staphylococcus aureus (MRSA) 04/23/2021    Venous hypertension, chronic, with ulcer and inflammation, left ([facility] ) 04/19/2021    Alcohol abuse 08/01/2019    Seizures ([place] [address] ) 07/09/2019    Essential hypertension 08/02/2018    GERD without esophagitis 08/02/2018    Alcohol withdrawal seizure without complication ([place] [address] )      He  has a past surgical history that includes Cyst Removal and Carpal tunnel release (Bilateral)  His family history includes Coronary artery disease in his family; Diabetes in his paternal grandmother; Heart attack in his father; Stroke in his father  He  reports that he has been smoking cigarettes  He has been smoking about 1 00 pack per day  He has never used smokeless tobacco  He reports current alcohol use of about 6 0 standard drinks of alcohol per week  He reports that he does not use drugs    Current Outpatient Medications   Medication Sig Dispense Refill    amLODIPine (NORVASC) 2 5 mg tablet Take 1 tablet by mouth once daily 90 tablet 0    fenofibrate (TRICOR) 145 mg tablet Take 1 tablet (145 mg total) by mouth daily (Patient not taking: Reported on 4/19/2021) 30 tablet 2    ibuprofen (MOTRIN) 800 mg tablet Take 1 tablet (800 mg total) by mouth every 8 (eight) hours as needed for moderate pain 21 tablet 0    levETIRAcetam (KEPPRA) 750 mg tablet Take 1 tablet (750 mg total) by mouth 2 (two) times a day 60 tablet 5    loratadine (CLARITIN) 10 mg tablet Take 1 tablet (10 mg total) by mouth daily (Patient not taking: Reported on 4/19/2021) 90 tablet 0     No current facility-administered medications for this visit  He is allergic to nonoxynol 9     Review of Systems   Constitutional: Negative for activity change, appetite change, chills, diaphoresis, fatigue, fever and unexpected weight change  Respiratory: Negative for shortness of breath  Cardiovascular: Negative for chest pain  Gastrointestinal: Negative for abdominal pain  Skin: Positive for wound (  Left lower extremity ulcer)  Negative for color change, pallor and rash  Objective:       Wound 04/19/21 Venous Ulcer Pretibial Left (Active)   Wound Image Images linked 05/17/21 0803   Wound Description Eschar;Brown;Ruff[place] 05/17/21 0807   Martha-wound Assessment Edema; Excoriated 05/17/21 0807   Wound Length (cm) 0 8 cm 05/17/21 0807   Wound Width (cm) 1 2 cm 05/17/21 0807   Wound Depth (cm) 0 2 cm 05/17/21 0807   Wound Surface Area (cm^2) 0 96 cm^2 05/17/21 0807   Wound Volume (cm^3) 0 19 cm^3 05/17/21 0807   Calculated Wound Volume (cm^3) 0 19 cm^3 05/17/21 0807   Change in Wound Size % -58 33 05/17/21 0807   Drainage Amount Scant 05/17/21 0807   Drainage Description Serous 05/17/21 0807   Non-staged Wound Description Full thickness 05/17/21 0807   Treatments Cleansed 05/17/21 0807       /78   Pulse 88   Temp (!) 96 °F (35 6 °C)   Resp 20     Physical Exam  Vitals signs reviewed  Constitutional:       General: He is not in acute distress  Appearance: Normal appearance  He is normal weight  He is not ill-appearing, toxic-appearing or diaphoretic  HENT:      Head: Normocephalic and atraumatic  Right Ear: External ear normal       Left Ear: External ear normal    Eyes:      General: No scleral icterus  Right eye: No discharge  Left eye: No discharge  Neck:      Musculoskeletal: Normal range of motion     Cardiovascular: Rate and Rhythm: Normal rate  Pulmonary:      Effort: Pulmonary effort is normal  No respiratory distress  Musculoskeletal:      Left lower leg: Edema present  Skin:     General: Skin is warm  Coloration: Skin is not jaundiced or pale  Comments: Left lower extremity with noted edema  Venous stasis changes  There is venous stasis ulcer which has some noted slough but otherwise healthy bed of granulation tissue  Selective debridement completed  Neurological:      General: No focal deficit present  Mental Status: He is alert and oriented to person, place, and time  Cranial Nerves: No cranial nerve deficit  Psychiatric:         Mood and Affect: Mood normal          Behavior: Behavior normal          Thought Content: Thought content normal          Judgment: Judgment normal            Wound Instructions:  Orders Placed This Encounter   Procedures    Wound cleansing and dressings     Left Lower Leg  Wash your hands with soap and water  Remove old dressing, discard into plastic bag and place in trash  Cleanse the wound with normal saline prior to applying a clean dressing  Do not use tissue or cotton balls  Do not scrub the wound  Pat dry using gauze  Shower yes  Do not get dressing wet  May purchase cast cover at medical supply company  Apply moisturizing cream  Apply dermagran to the wound  Cover with 4x4 gauze  Secure with mirella and paper tape  Change dressing every other day  This was applied today      Elastic Tubular Stocking     Tubular elastic bandage: Apply from base of toes to behind the knee  Apply in AM, may remove for sleep      Avoid prolonged standing in one place      Elevate leg(s) above the level of the heart when sitting or as much as possible  Standing Status:   Future     Standing Expiration Date:   5/17/2022        Diagnosis ICD-10-CM Associated Orders   1   Venous hypertension, chronic, with ulcer and inflammation, left ([facility])  I87 332 lidocaine (LMX) 4 % cream    L96 139 Wound cleansing and dressings

## 2021-05-25 DIAGNOSIS — I10 HTN (HYPERTENSION): ICD-10-CM

## 2021-05-26 RX ORDER — AMLODIPINE BESYLATE 2.5 MG/1
TABLET ORAL
Qty: 90 TABLET | Refills: 0 | Status: SHIPPED | OUTPATIENT
Start: 2021-05-26 | End: 2021-09-28

## 2021-06-10 ENCOUNTER — OFFICE VISIT (OUTPATIENT)
Dept: WOUND CARE | Facility: CLINIC | Age: 41
End: 2021-06-10

## 2021-06-10 VITALS
TEMPERATURE: 97.7 F | RESPIRATION RATE: 20 BRPM | DIASTOLIC BLOOD PRESSURE: 76 MMHG | HEART RATE: 78 BPM | SYSTOLIC BLOOD PRESSURE: 132 MMHG

## 2021-06-10 DIAGNOSIS — L97.929 VENOUS HYPERTENSION, CHRONIC, WITH ULCER AND INFLAMMATION, LEFT (HCC): Primary | ICD-10-CM

## 2021-06-10 DIAGNOSIS — I87.332 VENOUS HYPERTENSION, CHRONIC, WITH ULCER AND INFLAMMATION, LEFT (HCC): Primary | ICD-10-CM

## 2021-06-10 PROCEDURE — 97597 DBRDMT OPN WND 1ST 20 CM/<: CPT | Performed by: SURGERY

## 2021-06-10 RX ORDER — LIDOCAINE 40 MG/G
CREAM TOPICAL ONCE
Status: COMPLETED | OUTPATIENT
Start: 2021-06-10 | End: 2021-06-10

## 2021-06-10 RX ADMIN — LIDOCAINE 1 APPLICATION: 40 CREAM TOPICAL at 08:17

## 2021-06-10 NOTE — PATIENT INSTRUCTIONS
Orders Placed This Encounter   Procedures    Wound cleansing and dressings     Wound cleansing and dressings    Left Lower Leg  Wash your hands with soap and water  Remove old dressing, discard into plastic bag and place in trash  Cleanse the wound with normal saline prior to applying a clean dressing  Do not use tissue or cotton balls  Do not scrub the wound  Pat dry using gauze  Shower yes  Do not get dressing wet  May purchase cast cover at medical supply company  Apply moisturizing cream  Apply Maxorb dermagran to the wound  Cover with 4x4 gauze  Secure with mirella and paper tape  Change dressing every other day  This was applied today      May change dressing to Maxorb to wound instead of dermagran if you see increased drainage  Follow up as needed  Please call us if wound worsens     Standing Status:   Future     Standing Expiration Date:   6/10/2022    Wound compression and edema control     Elastic Tubular Stocking     Tubular elastic bandage: Apply from base of toes to behind the knee  Apply in AM, may remove for sleep      Avoid prolonged standing in one place      Elevate leg(s) above the level of the heart when sitting or as much as possible  Wear your compression stockings during the day       Standing Status:   Future     Standing Expiration Date:   6/10/2022

## 2021-06-10 NOTE — ASSESSMENT & PLAN NOTE
No significant improvement patient's left lower extremity venous stasis ulcer  Noted slough  Selective debridement completed  Patient currently not compressing the left lower extremity with any type of compression stocking  He is also not usually dressing the wound either  Unfortunately patient was denied medical assistance  For now the wound does not appear infected does have a healthy base of granulation tissue  Healthy bleeding  Will have him continue with the dermagran along with compression stockings  Will also give him Maxorb and case drainage increases  Explained that compression is extremely important along with leg elevation when he is not on his feet  It is very important he continues to do this daily  He should continue to try to secure insurance  He states he will call us if things worsen if he needs us

## 2021-06-10 NOTE — PROGRESS NOTES
Patient ID: Dione Granados is a 35 y o  male Date of Birth 1980     Chief Complaint  Chief Complaint   Patient presents with    Follow Up Wound Care Visit     wound RLE       Allergies  Nonoxynol 9    Assessment:    Venous hypertension, chronic, with ulcer and inflammation, left (HCC)  No significant improvement patient's left lower extremity venous stasis ulcer  Noted slough  Selective debridement completed  Patient currently not compressing the left lower extremity with any type of compression stocking  He is also not usually dressing the wound either  Unfortunately patient was denied medical assistance  For now the wound does not appear infected does have a healthy base of granulation tissue  Healthy bleeding  Will have him continue with the dermagran along with compression stockings  Will also give him Maxorb and case drainage increases  Explained that compression is extremely important along with leg elevation when he is not on his feet  It is very important he continues to do this daily  He should continue to try to secure insurance  He states he will call us if things worsen if he needs us  Diagnoses and all orders for this visit:    Venous hypertension, chronic, with ulcer and inflammation, left (HCC)  -     lidocaine (LMX) 4 % cream  -     Wound cleansing and dressings; Future  -     Wound compression and edema control; Future              Debridement   Wound 04/19/21 Venous Ulcer Pretibial Left    Universal Protocol:  Consent: Verbal consent obtained  Risks and benefits: risks, benefits and alternatives were discussed  Consent given by: patient  Time out: Immediately prior to procedure a "time out" was called to verify the correct patient, procedure, equipment, support staff and site/side marked as required    Patient understanding: patient states understanding of the procedure being performed  Patient consent: the patient's understanding of the procedure matches consent given  Patient identity confirmed: verbally with patient      Performed by: physician and resident  Debridement type: selective  Pain control: lidocaine 4%  Post-debridement measurements  Length (cm): 0 6  Width (cm): 0 7  Depth (cm): 0 2  Percent debrided: 100%  Surface Area (cm^2): 0 42  Area debrided (cm^2): 0 42  Volume (cm^3): 0 08  Devitalized tissue debrided: biofilm and slough  Instrument(s) utilized: curette  Bleeding: small  Hemostasis obtained with: pressure  Procedural pain (0-10): 0  Post-procedural pain: 0   Response to treatment: procedure was tolerated well          Plan:     Wound 04/19/21 Venous Ulcer Pretibial Left (Active)   Wound Image Images linked 06/10/21 0805   Wound Description Beefy red;Pink 06/10/21 0810   Martha-wound Assessment Edema; Hyperpigmented 06/10/21 0810   Wound Length (cm) 0 6 cm 06/10/21 0810   Wound Width (cm) 0 7 cm 06/10/21 0810   Wound Depth (cm) 0 2 cm 06/10/21 0810   Wound Surface Area (cm^2) 0 42 cm^2 06/10/21 0810   Wound Volume (cm^3) 0 08 cm^3 06/10/21 0810   Calculated Wound Volume (cm^3) 0 08 cm^3 06/10/21 0810   Change in Wound Size % 33 33 06/10/21 0810   Drainage Amount Small 06/10/21 0810   Drainage Description Yellow;Purulent (as reported by patient) 06/10/21 0810   Non-staged Wound Description Full thickness 06/10/21 0810   Treatments Irrigation with NSS 06/10/21 0810   Dressing Status -- (open to air) 06/10/21 0810       Wound 04/19/21 Venous Ulcer Pretibial Left (Active)   Date First Assessed/Time First Assessed: 04/19/21 0802   Primary Wound Type: Venous Ulcer  Location: Pretibial  Wound Location Orientation: Left       [REMOVED] Wound 11/20/19 Leg Left; Anterior (Removed)   Resolved Date: 04/19/21  Date First Assessed/Time First Assessed: 11/20/19 1900   Pre-Existing Wound: Yes  Location: Leg  Wound Location Orientation: Left; Anterior  Wound Description (Comments): circular wound on left shin, wound has depth       Subjective:           41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 with known hypertension, GERD, seizures, alcohol abuse, left lower extremity ulceration, presents today for follow upevaluation and treatment of left lower extremity ulceration  Denies any left lower extremity pain  Had some significant increased drainage on yesterday  Patient continues to be noncompliant with compression and states is usually due to a very busy work schedule  Unfortunately was denied insurance due to the fact that her income is too high  No fevers or chills  No nausea vomiting no fevers or chills  The following portions of the patient's history were reviewed and updated as appropriate:   He  has a past medical history of Alcohol abuse, Asthma, GERD (gastroesophageal reflux disease), History of cardiovascular stress test (06/18/2018), echocardiogram (06/18/2018), Hypertension, PVC's (premature ventricular contractions), and Seizures ([facility] )  He   Patient Active Problem List    Diagnosis Date Noted    Infection of wound due to methicillin resistant Staphylococcus aureus (MRSA) 04/23/2021    Venous hypertension, chronic, with ulcer and inflammation, left ([facility] ) 04/19/2021    Alcohol abuse 08/01/2019    Seizures ([facility] ) 07/09/2019    Essential hypertension 08/02/2018    GERD without esophagitis 08/02/2018    Alcohol withdrawal seizure without complication ([facility] )      He  has a past surgical history that includes Cyst Removal and Carpal tunnel release (Bilateral)  His family history includes Coronary artery disease in his family; Diabetes in his paternal grandmother; Heart attack in his father; Stroke in his father  He  reports that he has been smoking cigarettes  He has been smoking about 1 00 pack per day  He has never used smokeless tobacco  He reports current alcohol use of about 6 0 standard drinks of alcohol per week  He reports that he does not use drugs    Current Outpatient Medications   Medication Sig Dispense Refill    amLODIPine (NORVASC) 2 5 mg tablet Take 1 tablet by mouth once daily 90 tablet 0    fenofibrate (TRICOR) 145 mg tablet Take 1 tablet (145 mg total) by mouth daily (Patient not taking: Reported on 4/19/2021) 30 tablet 2    ibuprofen (MOTRIN) 800 mg tablet Take 1 tablet (800 mg total) by mouth every 8 (eight) hours as needed for moderate pain 21 tablet 0    levETIRAcetam (KEPPRA) 750 mg tablet Take 1 tablet (750 mg total) by mouth 2 (two) times a day 60 tablet 5    loratadine (CLARITIN) 10 mg tablet Take 1 tablet (10 mg total) by mouth daily (Patient not taking: Reported on 4/19/2021) 90 tablet 0     No current facility-administered medications for this visit  He is allergic to nonoxynol 9     Review of Systems   Constitutional: Negative for activity change, appetite change, chills, diaphoresis, fatigue, fever and unexpected weight change  Respiratory: Negative for shortness of breath  Cardiovascular: Negative for chest pain  Gastrointestinal: Negative for abdominal pain  Skin: Positive for wound (  Left lower extremity ulcer)  Negative for color change, pallor and rash  Objective:       Wound 04/19/21 Venous Ulcer Pretibial Left (Active)   Wound Image Images linked 06/10/21 0805   Wound Description Beefy red;Pink 06/10/21 0810   Martha-wound Assessment Edema; Hyperpigmented 06/10/21 0810   Wound Length (cm) 0 6 cm 06/10/21 0810   Wound Width (cm) 0 7 cm 06/10/21 0810   Wound Depth (cm) 0 2 cm 06/10/21 0810   Wound Surface Area (cm^2) 0 42 cm^2 06/10/21 0810   Wound Volume (cm^3) 0 08 cm^3 06/10/21 0810   Calculated Wound Volume (cm^3) 0 08 cm^3 06/10/21 0810   Change in Wound Size % 33 33 06/10/21 0810   Drainage Amount Small 06/10/21 0810   Drainage Description Yellow;Purulent (as reported by patient) 06/10/21 0810   Non-staged Wound Description Full thickness 06/10/21 0810   Treatments Irrigation with NSS 06/10/21 0810   Dressing Status -- (open to air) 06/10/21 0810       /76   Pulse 78   Temp 97 7 °F (36 5 °C)   Resp 20 Physical Exam  Vitals signs reviewed  Constitutional:       General: He is not in acute distress  Appearance: Normal appearance  He is not ill-appearing, toxic-appearing or diaphoretic  HENT:      Head: Normocephalic and atraumatic  Right Ear: External ear normal       Left Ear: External ear normal    Eyes:      General: No scleral icterus  Right eye: No discharge  Left eye: No discharge  Neck:      Musculoskeletal: Normal range of motion  Cardiovascular:      Rate and Rhythm: Normal rate  Pulmonary:      Effort: Pulmonary effort is normal  No respiratory distress  Musculoskeletal: Normal range of motion  General: No deformity or signs of injury  Left lower leg: Edema present  Skin:     General: Skin is warm  Coloration: Skin is not jaundiced or pale  Comments: Left lower extremity ulceration with noted slough  Wound does not appear to be infected  There was some venous stasis discoloration of the periwound skin  Selective debridement completed to remove slough  Neurological:      General: No focal deficit present  Mental Status: He is alert and oriented to person, place, and time  Cranial Nerves: No cranial nerve deficit  Psychiatric:         Mood and Affect: Mood normal          Behavior: Behavior normal          Thought Content: Thought content normal          Judgment: Judgment normal            Wound Instructions:  Orders Placed This Encounter   Procedures    Wound cleansing and dressings     Wound cleansing and dressings    Left Lower Leg  Wash your hands with soap and water  Remove old dressing, discard into plastic bag and place in trash  Cleanse the wound with normal saline prior to applying a clean dressing  Do not use tissue or cotton balls  Do not scrub the wound  Pat dry using gauze  Shower yes  Do not get dressing wet  May purchase cast cover at medical supply company      Apply moisturizing cream  Apply Maxorb dermagran to the wound  Cover with 4x4 gauze  Secure with mirella and paper tape  Change dressing every other day  This was applied today      May change dressing to Maxorb to wound instead of dermagran if you see increased drainage  Follow up as needed  Please call us if wound worsens     Standing Status:   Future     Standing Expiration Date:   6/10/2022    Wound compression and edema control     Elastic Tubular Stocking     Tubular elastic bandage: Apply from base of toes to behind the knee  Apply in AM, may remove for sleep      Avoid prolonged standing in one place      Elevate leg(s) above the level of the heart when sitting or as much as possible  Wear your compression stockings during the day  Standing Status:   Future     Standing Expiration Date:   6/10/2022        Diagnosis ICD-10-CM Associated Orders   1   Venous hypertension, chronic, with ulcer and inflammation, left (HCC)  I87 332 lidocaine (LMX) 4 % cream    L97 929 Wound cleansing and dressings     Wound compression and edema control

## 2021-09-26 DIAGNOSIS — I10 HTN (HYPERTENSION): ICD-10-CM

## 2021-09-28 RX ORDER — AMLODIPINE BESYLATE 2.5 MG/1
TABLET ORAL
Qty: 90 TABLET | Refills: 0 | Status: SHIPPED | OUTPATIENT
Start: 2021-09-28 | End: 2021-10-19 | Stop reason: SDUPTHER

## 2021-10-19 ENCOUNTER — OFFICE VISIT (OUTPATIENT)
Dept: FAMILY MEDICINE CLINIC | Facility: HOME HEALTHCARE | Age: 41
End: 2021-10-19

## 2021-10-19 VITALS
HEART RATE: 79 BPM | OXYGEN SATURATION: 97 % | TEMPERATURE: 98.5 F | SYSTOLIC BLOOD PRESSURE: 146 MMHG | DIASTOLIC BLOOD PRESSURE: 86 MMHG | WEIGHT: 214.4 LBS | BODY MASS INDEX: 30.76 KG/M2 | RESPIRATION RATE: 16 BRPM

## 2021-10-19 DIAGNOSIS — L30.9 DERMATITIS: ICD-10-CM

## 2021-10-19 DIAGNOSIS — I10 HTN (HYPERTENSION): ICD-10-CM

## 2021-10-19 DIAGNOSIS — R56.9 SEIZURES (HCC): ICD-10-CM

## 2021-10-19 DIAGNOSIS — I10 ESSENTIAL HYPERTENSION: ICD-10-CM

## 2021-10-19 DIAGNOSIS — Z00.00 ANNUAL PHYSICAL EXAM: Primary | ICD-10-CM

## 2021-10-19 DIAGNOSIS — F17.210 CONTINUOUS DEPENDENCE ON CIGARETTE SMOKING: ICD-10-CM

## 2021-10-19 DIAGNOSIS — I87.332 VENOUS HYPERTENSION, CHRONIC, WITH ULCER AND INFLAMMATION, LEFT (HCC): ICD-10-CM

## 2021-10-19 DIAGNOSIS — Z11.59 ENCOUNTER FOR HEPATITIS C SCREENING TEST FOR LOW RISK PATIENT: ICD-10-CM

## 2021-10-19 DIAGNOSIS — L97.929 VENOUS HYPERTENSION, CHRONIC, WITH ULCER AND INFLAMMATION, LEFT (HCC): ICD-10-CM

## 2021-10-19 PROCEDURE — T1015 CLINIC SERVICE: HCPCS | Performed by: FAMILY MEDICINE

## 2021-10-19 RX ORDER — TRIAMCINOLONE ACETONIDE 5 MG/G
OINTMENT TOPICAL 2 TIMES DAILY
Qty: 30 G | Refills: 2 | Status: SHIPPED | OUTPATIENT
Start: 2021-10-19 | End: 2021-12-15 | Stop reason: SDUPTHER

## 2021-10-19 RX ORDER — AMLODIPINE BESYLATE 5 MG/1
5 TABLET ORAL DAILY
Qty: 30 TABLET | Refills: 5 | Status: SHIPPED | OUTPATIENT
Start: 2021-10-19 | End: 2022-06-06

## 2021-10-28 DIAGNOSIS — R56.9 SEIZURES (HCC): ICD-10-CM

## 2021-10-28 RX ORDER — LEVETIRACETAM 750 MG/1
TABLET ORAL
Qty: 60 TABLET | Refills: 0 | Status: SHIPPED | OUTPATIENT
Start: 2021-10-28 | End: 2021-12-03

## 2021-11-17 DIAGNOSIS — Z20.822 EXPOSURE TO COVID-19 VIRUS: Primary | ICD-10-CM

## 2021-11-17 PROCEDURE — U0003 INFECTIOUS AGENT DETECTION BY NUCLEIC ACID (DNA OR RNA); SEVERE ACUTE RESPIRATORY SYNDROME CORONAVIRUS 2 (SARS-COV-2) (CORONAVIRUS DISEASE [COVID-19]), AMPLIFIED PROBE TECHNIQUE, MAKING USE OF HIGH THROUGHPUT TECHNOLOGIES AS DESCRIBED BY CMS-2020-01-R: HCPCS | Performed by: PHYSICIAN ASSISTANT

## 2021-11-17 PROCEDURE — U0005 INFEC AGEN DETEC AMPLI PROBE: HCPCS | Performed by: PHYSICIAN ASSISTANT

## 2021-11-18 LAB — SARS-COV-2 RNA RESP QL NAA+PROBE: NEGATIVE

## 2021-11-19 DIAGNOSIS — Z20.822 EXPOSURE TO COVID-19 VIRUS: Primary | ICD-10-CM

## 2021-11-19 PROCEDURE — U0005 INFEC AGEN DETEC AMPLI PROBE: HCPCS | Performed by: PHYSICIAN ASSISTANT

## 2021-11-19 PROCEDURE — U0003 INFECTIOUS AGENT DETECTION BY NUCLEIC ACID (DNA OR RNA); SEVERE ACUTE RESPIRATORY SYNDROME CORONAVIRUS 2 (SARS-COV-2) (CORONAVIRUS DISEASE [COVID-19]), AMPLIFIED PROBE TECHNIQUE, MAKING USE OF HIGH THROUGHPUT TECHNOLOGIES AS DESCRIBED BY CMS-2020-01-R: HCPCS | Performed by: PHYSICIAN ASSISTANT

## 2021-12-01 DIAGNOSIS — R56.9 SEIZURES (HCC): ICD-10-CM

## 2021-12-03 RX ORDER — LEVETIRACETAM 750 MG/1
TABLET ORAL
Qty: 60 TABLET | Refills: 0 | Status: SHIPPED | OUTPATIENT
Start: 2021-12-03 | End: 2022-01-03 | Stop reason: SDUPTHER

## 2021-12-15 DIAGNOSIS — L30.9 DERMATITIS: ICD-10-CM

## 2021-12-20 ENCOUNTER — OFFICE VISIT (OUTPATIENT)
Dept: FAMILY MEDICINE CLINIC | Facility: HOME HEALTHCARE | Age: 41
End: 2021-12-20

## 2021-12-20 VITALS
BODY MASS INDEX: 30.06 KG/M2 | WEIGHT: 210 LBS | OXYGEN SATURATION: 97 % | DIASTOLIC BLOOD PRESSURE: 76 MMHG | RESPIRATION RATE: 16 BRPM | HEART RATE: 78 BPM | TEMPERATURE: 98.5 F | SYSTOLIC BLOOD PRESSURE: 126 MMHG | HEIGHT: 70 IN

## 2021-12-20 DIAGNOSIS — Z87.898 HISTORY OF SEIZURES: ICD-10-CM

## 2021-12-20 DIAGNOSIS — L97.929 VENOUS HYPERTENSION, CHRONIC, WITH ULCER AND INFLAMMATION, LEFT (HCC): ICD-10-CM

## 2021-12-20 DIAGNOSIS — I10 ESSENTIAL HYPERTENSION: Primary | ICD-10-CM

## 2021-12-20 DIAGNOSIS — I87.332 VENOUS HYPERTENSION, CHRONIC, WITH ULCER AND INFLAMMATION, LEFT (HCC): ICD-10-CM

## 2021-12-20 PROCEDURE — T1015 CLINIC SERVICE: HCPCS | Performed by: FAMILY MEDICINE

## 2021-12-20 RX ORDER — TRIAMCINOLONE ACETONIDE 5 MG/G
OINTMENT TOPICAL 2 TIMES DAILY
Qty: 30 G | Refills: 0 | Status: SHIPPED | OUTPATIENT
Start: 2021-12-20 | End: 2022-08-08

## 2022-01-03 DIAGNOSIS — R56.9 SEIZURES (HCC): ICD-10-CM

## 2022-01-03 RX ORDER — LEVETIRACETAM 750 MG/1
750 TABLET ORAL 2 TIMES DAILY
Qty: 60 TABLET | Refills: 2 | Status: SHIPPED | OUTPATIENT
Start: 2022-01-03 | End: 2022-04-04

## 2022-04-02 DIAGNOSIS — R56.9 SEIZURES (HCC): ICD-10-CM

## 2022-04-04 DIAGNOSIS — R56.9 SEIZURES (HCC): ICD-10-CM

## 2022-04-04 RX ORDER — LEVETIRACETAM 750 MG/1
750 TABLET ORAL 2 TIMES DAILY
Qty: 60 TABLET | Refills: 2 | OUTPATIENT
Start: 2022-04-04

## 2022-04-04 RX ORDER — LEVETIRACETAM 750 MG/1
TABLET ORAL
Qty: 60 TABLET | Refills: 5 | Status: SHIPPED | OUTPATIENT
Start: 2022-04-04

## 2022-08-06 DIAGNOSIS — L30.9 DERMATITIS: ICD-10-CM

## 2022-08-08 RX ORDER — TRIAMCINOLONE ACETONIDE 5 MG/G
OINTMENT TOPICAL
Qty: 30 G | Refills: 0 | Status: SHIPPED | OUTPATIENT
Start: 2022-08-08 | End: 2022-10-10

## 2022-09-27 DIAGNOSIS — R56.9 SEIZURES (HCC): ICD-10-CM

## 2022-09-28 RX ORDER — LEVETIRACETAM 750 MG/1
TABLET ORAL
Qty: 60 TABLET | Refills: 1 | Status: SHIPPED | OUTPATIENT
Start: 2022-09-28

## 2022-10-07 DIAGNOSIS — L30.9 DERMATITIS: ICD-10-CM

## 2022-10-10 RX ORDER — TRIAMCINOLONE ACETONIDE 5 MG/G
OINTMENT TOPICAL
Qty: 30 G | Refills: 0 | Status: SHIPPED | OUTPATIENT
Start: 2022-10-10

## 2022-11-07 ENCOUNTER — HOSPITAL ENCOUNTER (EMERGENCY)
Facility: HOSPITAL | Age: 42
Discharge: HOME | End: 2022-11-07
Attending: EMERGENCY MEDICINE

## 2022-11-07 ENCOUNTER — APPOINTMENT (EMERGENCY)
Dept: RADIOLOGY | Facility: HOSPITAL | Age: 42
End: 2022-11-07

## 2022-11-07 VITALS
DIASTOLIC BLOOD PRESSURE: 89 MMHG | TEMPERATURE: 98.6 F | HEART RATE: 88 BPM | WEIGHT: 209.44 LBS | OXYGEN SATURATION: 97 % | RESPIRATION RATE: 16 BRPM | BODY MASS INDEX: 30.05 KG/M2 | SYSTOLIC BLOOD PRESSURE: 151 MMHG

## 2022-11-07 DIAGNOSIS — R21 RASH: ICD-10-CM

## 2022-11-07 DIAGNOSIS — I87.332 VENOUS HYPERTENSION, CHRONIC, WITH ULCER AND INFLAMMATION, LEFT (HCC): ICD-10-CM
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
## 2022-11-07 DIAGNOSIS — T14.8XXA WOUND INFECTION: ICD-10-CM

## 2022-11-07 DIAGNOSIS — L97.929 VENOUS HYPERTENSION, CHRONIC, WITH ULCER AND INFLAMMATION, LEFT (HCC): ICD-10-CM

## 2022-11-07 DIAGNOSIS — L03.90 CELLULITIS: ICD-10-CM

## 2022-11-07 DIAGNOSIS — L03.116 CELLULITIS OF LEFT LOWER EXTREMITY: Primary | ICD-10-CM

## 2022-11-07 DIAGNOSIS — L08.9 WOUND INFECTION: ICD-10-CM

## 2022-11-07 DIAGNOSIS — A49.02 INFECTION OF WOUND DUE TO METHICILLIN RESISTANT STAPHYLOCOCCUS AUREUS (MRSA): ICD-10-CM

## 2022-11-07 LAB
ALBUMIN SERPL BCP-MCNC: 3.6 G/DL (ref 3.5–5)
ALP SERPL-CCNC: 76 U/L (ref 46–116)
ALT SERPL W P-5'-P-CCNC: 32 U/L (ref 12–78)
ANION GAP SERPL CALCULATED.3IONS-SCNC: 10 MMOL/L (ref 4–13)
BASOPHILS # BLD AUTO: 0.1 THOUSANDS/ÂΜL (ref 0–0.1)
BASOPHILS NFR BLD AUTO: 1 % (ref 0–1)
BILIRUB SERPL-MCNC: 0.34 MG/DL (ref 0.2–1)
BUN SERPL-MCNC: 7 MG/DL (ref 5–25)
CALCIUM SERPL-MCNC: 8.8 MG/DL (ref 8.3–10.1)
CARDIAC TROPONIN I PNL SERPL HS: 4 NG/L
CHLORIDE SERPL-SCNC: 102 MMOL/L (ref 96–108)
CO2 SERPL-SCNC: 26 MMOL/L (ref 21–32)
CREAT SERPL-MCNC: 0.94 MG/DL (ref 0.6–1.3)
CRP SERPL QL: 5.3 MG/L
EOSINOPHIL # BLD AUTO: 0.32 THOUSAND/ÂΜL (ref 0–0.61)
EOSINOPHIL NFR BLD AUTO: 3 % (ref 0–6)
ERYTHROCYTE [DISTWIDTH] IN BLOOD BY AUTOMATED COUNT: 12.3 % (ref 11.6–15.1)
GFR SERPL CREATININE-BSD FRML MDRD: 98 ML/MIN/1.73SQ M
GLUCOSE SERPL-MCNC: 98 MG/DL (ref 65–140)
HCT VFR BLD AUTO: 46.4 % (ref 36.5–49.3)
HGB BLD-MCNC: 16.4 G/DL (ref 12–17)
IMM GRANULOCYTES # BLD AUTO: 0.03 THOUSAND/UL (ref 0–0.2)
IMM GRANULOCYTES NFR BLD AUTO: 0 % (ref 0–2)
LYMPHOCYTES # BLD AUTO: 1.94 THOUSANDS/ÂΜL (ref 0.6–4.47)
LYMPHOCYTES NFR BLD AUTO: 20 % (ref 14–44)
MCH RBC QN AUTO: 33 PG (ref 26.8–34.3)
MCHC RBC AUTO-ENTMCNC: 35.3 G/DL (ref 31.4–37.4)
MCV RBC AUTO: 93 FL (ref 82–98)
MONOCYTES # BLD AUTO: 0.45 THOUSAND/ÂΜL (ref 0.17–1.22)
MONOCYTES NFR BLD AUTO: 5 % (ref 4–12)
NEUTROPHILS # BLD AUTO: 6.73 THOUSANDS/ÂΜL (ref 1.85–7.62)
NEUTS SEG NFR BLD AUTO: 71 % (ref 43–75)
NRBC BLD AUTO-RTO: 0 /100 WBCS
PLATELET # BLD AUTO: 237 THOUSANDS/UL (ref 149–390)
PMV BLD AUTO: 9.5 FL (ref 8.9–12.7)
POTASSIUM SERPL-SCNC: 4.1 MMOL/L (ref 3.5–5.3)
PROT SERPL-MCNC: 7.9 G/DL (ref 6.4–8.4)
RBC # BLD AUTO: 4.97 MILLION/UL (ref 3.88–5.62)
SODIUM SERPL-SCNC: 138 MMOL/L (ref 135–147)
WBC # BLD AUTO: 9.57 THOUSAND/UL (ref 4.31–10.16)

## 2022-11-07 RX ORDER — SULFAMETHOXAZOLE AND TRIMETHOPRIM 800; 160 MG/1; MG/1
1 TABLET ORAL EVERY 12 HOURS SCHEDULED
Qty: 14 TABLET | Refills: 0 | Status: SHIPPED | OUTPATIENT
Start: 2022-11-07 | End: 2022-11-14

## 2022-11-07 RX ORDER — CEPHALEXIN 500 MG/1
500 CAPSULE ORAL EVERY 6 HOURS SCHEDULED
Qty: 28 CAPSULE | Refills: 0 | Status: SHIPPED | OUTPATIENT
Start: 2022-11-07 | End: 2022-11-14

## 2022-11-07 NOTE — Clinical Note
Nisa Monsivais was seen and treated in our emergency department on 11/7/2022  Diagnosis:     Christian Hadleyist  may return to work on return date  He may return on this date: 11/08/2022         If you have any questions or concerns, please don't hesitate to call        Kimber Reese MD    ______________________________           _______________          _______________  Hospital Representative                              Date                                Time
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
## 2022-11-07 NOTE — DISCHARGE INSTRUCTIONS
Please make sure to schedule appointment with wound care clinic and primary care provider follow-up with the next 3 days  Please make sure to collect antibiotics and take as directed for the next 7 days  If symptoms progress or new symptoms develop please report to the emergency department or urgent care

## 2022-11-07 NOTE — ED ATTENDING ATTESTATION
11/7/2022  IJacinto DO, saw and evaluated the patient  I have discussed the patient with the resident/non-physician practitioner and agree with the resident's/non-physician practitioner's findings, Plan of Care, and MDM as documented in the resident's/non-physician practitioner's note, except where noted  All available labs and Radiology studies were reviewed  I was present for key portions of any procedure(s) performed by the resident/non-physician practitioner and I was immediately available to provide assistance  At this point I agree with the current assessment done in the Emergency Department  I have conducted an independent evaluation of this patient a history and physical is as follows:    ED Course     Patient is a pleasant 47-year-old male with a chronic nonhealing wounds left lower extremity  He states was follow with wound care  He states the increased redness over last several days  Does fevers or chills  X-ray negative for air or bony involvement  No evidence for osteomyelitis  Will place on Keflex and Bactrim, labs reviewed, refer back to wound care clinic      Critical Care Time  Procedures

## 2022-11-08 LAB
ATRIAL RATE: 74 BPM
P AXIS: 57 DEGREES
PR INTERVAL: 158 MS
QRS AXIS: 56 DEGREES
QRSD INTERVAL: 108 MS
QT INTERVAL: 420 MS
QTC INTERVAL: 466 MS
T WAVE AXIS: 46 DEGREES
VENTRICULAR RATE: 74 BPM

## 2022-11-14 ENCOUNTER — TELEPHONE (OUTPATIENT)
Dept: GASTROENTEROLOGY | Facility: CLINIC | Age: 42
End: 2022-11-14

## 2022-11-14 ENCOUNTER — OFFICE VISIT (OUTPATIENT)
Dept: SURGERY | Facility: CLINIC | Age: 42
End: 2022-11-14

## 2022-11-14 VITALS
WEIGHT: 214.8 LBS | BODY MASS INDEX: 30.75 KG/M2 | TEMPERATURE: 97.6 F | HEART RATE: 83 BPM | DIASTOLIC BLOOD PRESSURE: 83 MMHG | HEIGHT: 70 IN | SYSTOLIC BLOOD PRESSURE: 149 MMHG

## 2022-11-14 DIAGNOSIS — L03.90 CELLULITIS: ICD-10-CM

## 2022-11-14 DIAGNOSIS — L97.929 VENOUS HYPERTENSION, CHRONIC, WITH ULCER AND INFLAMMATION, LEFT (HCC): Primary | ICD-10-CM

## 2022-11-14 DIAGNOSIS — I87.332 VENOUS HYPERTENSION, CHRONIC, WITH ULCER AND INFLAMMATION, LEFT (HCC): Primary | ICD-10-CM

## 2022-11-14 NOTE — TELEPHONE ENCOUNTER
Called [employer] (called # on the back of the card) to check if we are in network  Spoke with Kev MODI and he did confirm we are in network and enhanced      Ref# [id number]

## 2022-11-14 NOTE — PROGRESS NOTES
Assessment/Plan:    Venous hypertension, chronic, with ulcer and inflammation, left (HCC)  Left lower extremity edema with associated chronic venous stasis changes inflammation and ulceration  Ulcer noted to be approximately 1 5 x 1 x 0 4 cm deep  Noted slough  Healthy granulation tissue as well  Surrounding venous stasis changes  Will start local wound care with Mesalt placement daily  Referral placed for patient be seen back at the [address] [place] [address]  In addition multiple duplex studies were ordered to evaluate for arterial blood flow, ANISHA, and venous reflux  Patient will likely benefit from an Unna boot but will wait the a duplex studies and full evaluation at the [address] [place] [address]    Cellulitis  Cellulitis associated with infected venous stasis ulcer of the left lower extremity is now resolved  No evidence of any active cellulitis noted on exam        Diagnoses and all orders for this visit:    Venous hypertension, chronic, with ulcer and inflammation, left (HCC)  -     VAS ANISHA & waveform analysis, multiple levels; Future  -     VAS lower limb arterial duplex, complete bilateral  -     VAS reflux lower limb venous duplex study with reflux assesment, unilateral; Future  -     Ambulatory Referral to Wound Care; Future    Cellulitis          Subjective:      Patient ID: Nancy Hoover is a 38 y o  male  75-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 with known alcohol abuse, asthma, GERD, hypertension, venous stasis disease with ulceration, presents today for evaluation left lower extremity venous stasis inflammation with ulceration  He was seen in the ER for what appeared to be infected venous stasis ulcer with associated cellulitis of left lower extremity  He was provided antibiotics which he took  He states that the leg overall feels much better  The cellulitis has resolved  He still has an ulceration present a left lower extremity which does drain  He states he has had this for quite a few years    Unfortunately did see him back the consider a a year prior  Due to insurance issues he was unable to continue to come to the [address] [facility]  Does admit to some mild left lower extremity tenderness at the ulceration  There is edema  No fevers or chills  No nausea vomiting no other associated symptoms  The following portions of the patient's history were reviewed and updated as appropriate:   He  has a past medical history of Alcohol abuse, Asthma, GERD (gastroesophageal reflux disease), History of cardiovascular stress test (06/18/2018), echocardiogram (06/18/2018), Hypertension, PVC's (premature ventricular contractions), and Seizures ([facility] )  He   Patient Active Problem List    Diagnosis Date Noted   • Cellulitis 11/14/2022   • Infection of wound due to methicillin resistant Staphylococcus aureus (MRSA) 04/23/2021   • Venous hypertension, chronic, with ulcer and inflammation, left ([facility] ) 04/19/2021   • Alcohol abuse 08/01/2019   • Seizures ([facility] ) 07/09/2019   • Essential hypertension 08/02/2018   • GERD without esophagitis 08/02/2018   • Alcohol withdrawal seizure without complication ([facility] )      He  has a past surgical history that includes Cyst Removal and Carpal tunnel release (Bilateral)  His family history includes Coronary artery disease in his family; Diabetes in his paternal grandmother; Heart attack in his father; Stroke in his father  He  reports that he has been smoking cigarettes  He has been smoking about 2 00 packs per day  He has never used smokeless tobacco  He reports current alcohol use of about 6 0 standard drinks of alcohol per week  He reports that he does not use drugs    Current Outpatient Medications   Medication Sig Dispense Refill   • amLODIPine (NORVASC) 5 mg tablet Take 1 tablet by mouth once daily 30 tablet 5   • cephalexin (KEFLEX) 500 mg capsule Take 1 capsule (500 mg total) by mouth every 6 (six) hours for 7 days 28 capsule 0   • ibuprofen (MOTRIN) 800 mg tablet Take 1 tablet (800 mg total) by mouth every 8 (eight) hours as needed for moderate pain 21 tablet 0   • levETIRAcetam (KEPPRA) 750 mg tablet Take 1 tablet by mouth twice daily 60 tablet 1   • sulfamethoxazole-trimethoprim (BACTRIM DS) 800-160 mg per tablet Take 1 tablet by mouth every 12 (twelve) hours for 7 days 14 tablet 0   • triamcinolone (KENALOG) 0 5 % ointment APPLY OINTMENT TOPICALLY TWICE DAILY 30 g 0     No current facility-administered medications for this visit  He is allergic to nonoxynol 9     Review of Systems      Review systems completed, all negative except as noted in HPI  Objective:      /83   Pulse 83   Temp 97 6 °F (36 4 °C)   Ht 5' 10" (1 778 m)   Wt 97 4 kg (214 lb 12 8 oz)   BMI 30 82 kg/m²          Physical Exam  Vitals reviewed  Constitutional:       General: He is not in acute distress  Appearance: Normal appearance  He is not ill-appearing, toxic-appearing or diaphoretic  HENT:      Head: Normocephalic and atraumatic  Right Ear: External ear normal       Left Ear: External ear normal    Eyes:      General: No scleral icterus  Right eye: No discharge  Left eye: No discharge  Cardiovascular:      Rate and Rhythm: Normal rate and regular rhythm  Pulmonary:      Effort: Pulmonary effort is normal  No respiratory distress  Breath sounds: No wheezing  Abdominal:      General: Abdomen is flat  There is no distension  Musculoskeletal:         General: Tenderness present  No deformity or signs of injury  Cervical back: Normal range of motion  Right lower leg: Edema present  Left lower leg: Edema present  Skin:     General: Skin is warm  Coloration: Skin is not jaundiced or pale  Findings: No bruising or erythema  Comments: Left lower extremity noted edema  There is venous stasis changes noted  Ulceration noted measured approximately 1 5 x 1 x 0 4 cm  Granulation tissue assist lesion slough noted    No evidence of any surrounding cellulitis  Neurological:      General: No focal deficit present  Mental Status: He is alert and oriented to person, place, and time  Cranial Nerves: No cranial nerve deficit  Psychiatric:         Mood and Affect: Mood normal          Behavior: Behavior normal          Thought Content: Thought content normal          Judgment: Judgment normal            Note:    ER note dated November 7, 2022 was personally reviewed by me  Imaging:    Lower extremity venous duplex report dated April 5, 2021 was personally reviewed by me  In addition x-rays of the left tibia and fibula dated November 7, 2022 report was also personally reviewed by me

## 2022-11-14 NOTE — ASSESSMENT & PLAN NOTE
Cellulitis associated with infected venous stasis ulcer of the left lower extremity is now resolved    No evidence of any active cellulitis noted on exam

## 2022-11-14 NOTE — ASSESSMENT & PLAN NOTE
Left lower extremity edema with associated chronic venous stasis changes inflammation and ulceration  Ulcer noted to be approximately 1 5 x 1 x 0 4 cm deep  Noted slough  Healthy granulation tissue as well  Surrounding venous stasis changes  Will start local wound care with Mesalt placement daily  Referral placed for patient be seen back at the [address] [place] [address]  In addition multiple duplex studies were ordered to evaluate for arterial blood flow, ANISHA, and venous reflux    Patient will likely benefit from an Unna boot but will wait the a duplex studies and full evaluation at the [address] [place] [address]

## 2022-11-26 DIAGNOSIS — I10 HTN (HYPERTENSION): ICD-10-CM

## 2022-11-26 DIAGNOSIS — R56.9 SEIZURES (HCC): ICD-10-CM

## 2022-11-28 RX ORDER — AMLODIPINE BESYLATE 5 MG/1
TABLET ORAL
Qty: 30 TABLET | Refills: 0 | Status: SHIPPED | OUTPATIENT
Start: 2022-11-28

## 2022-11-28 RX ORDER — LEVETIRACETAM 750 MG/1
TABLET ORAL
Qty: 60 TABLET | Refills: 0 | Status: SHIPPED | OUTPATIENT
Start: 2022-11-28

## 2022-12-02 ENCOUNTER — HOSPITAL ENCOUNTER (OUTPATIENT)
Dept: NON INVASIVE DIAGNOSTICS | Facility: HOSPITAL | Age: 42
Discharge: HOME | End: 2022-12-02
Attending: SURGERY

## 2022-12-02 ENCOUNTER — HOSPITAL ENCOUNTER (OUTPATIENT)
Dept: NON INVASIVE DIAGNOSTICS | Facility: HOSPITAL | Age: 42
Discharge: HOME | End: 2022-12-02

## 2022-12-02 DIAGNOSIS — L97.929 VENOUS HYPERTENSION, CHRONIC, WITH ULCER AND INFLAMMATION, LEFT (HCC): ICD-10-CM

## 2022-12-02 DIAGNOSIS — I87.332 VENOUS HYPERTENSION, CHRONIC, WITH ULCER AND INFLAMMATION, LEFT (HCC): ICD-10-CM

## 2022-12-26 DIAGNOSIS — R56.9 SEIZURES (HCC): ICD-10-CM

## 2022-12-26 RX ORDER — LEVETIRACETAM 750 MG/1
TABLET ORAL
Qty: 60 TABLET | Refills: 0 | Status: SHIPPED | OUTPATIENT
Start: 2022-12-26 | End: 2023-01-05 | Stop reason: SDUPTHER

## 2023-01-03 ENCOUNTER — HOSPITAL ENCOUNTER (EMERGENCY)
Facility: HOSPITAL | Age: 43
Discharge: HOME | End: 2023-01-03
Attending: EMERGENCY MEDICINE

## 2023-01-03 VITALS
RESPIRATION RATE: 18 BRPM | TEMPERATURE: 97.5 F | SYSTOLIC BLOOD PRESSURE: 163 MMHG | DIASTOLIC BLOOD PRESSURE: 89 MMHG | HEART RATE: 86 BPM | OXYGEN SATURATION: 95 %

## 2023-01-03 DIAGNOSIS — M54.42 ACUTE LEFT-SIDED LOW BACK PAIN WITH LEFT-SIDED SCIATICA: Primary | ICD-10-CM

## 2023-01-03 RX ORDER — KETOROLAC TROMETHAMINE 10 MG/1
10 TABLET, FILM COATED ORAL EVERY 6 HOURS PRN
Qty: 20 TABLET | Refills: 0 | Status: SHIPPED | OUTPATIENT
Start: 2023-01-03 | End: 2023-01-08

## 2023-01-03 RX ORDER — LIDOCAINE 50 MG/G
1 PATCH TOPICAL DAILY
Qty: 6 PATCH | Refills: 0 | Status: SHIPPED | OUTPATIENT
Start: 2023-01-03

## 2023-01-03 RX ORDER — METHOCARBAMOL 500 MG/1
500 TABLET, FILM COATED ORAL 2 TIMES DAILY
Qty: 20 TABLET | Refills: 0 | Status: SHIPPED | OUTPATIENT
Start: 2023-01-03

## 2023-01-03 RX ORDER — OXYCODONE HYDROCHLORIDE AND ACETAMINOPHEN 5; 325 MG/1; MG/1
1 TABLET ORAL ONCE
Status: DISCONTINUED | OUTPATIENT
Start: 2023-01-03 | End: 2023-01-03

## 2023-01-03 RX ORDER — METHOCARBAMOL 500 MG/1
1000 TABLET, FILM COATED ORAL ONCE
Status: COMPLETED | OUTPATIENT
Start: 2023-01-03 | End: 2023-01-03

## 2023-01-03 RX ORDER — LIDOCAINE 50 MG/G
1 PATCH TOPICAL ONCE
Status: DISCONTINUED | OUTPATIENT
Start: 2023-01-03 | End: 2023-01-03 | Stop reason: HOSPADM

## 2023-01-03 RX ORDER — KETOROLAC TROMETHAMINE 10 MG/1
10 TABLET, FILM COATED ORAL ONCE
Status: COMPLETED | OUTPATIENT
Start: 2023-01-03 | End: 2023-01-03

## 2023-01-03 RX ADMIN — METHOCARBAMOL 1000 MG: 500 TABLET ORAL at 13:13

## 2023-01-03 RX ADMIN — LIDOCAINE 1 PATCH: 140 PATCH CUTANEOUS at 13:13

## 2023-01-03 RX ADMIN — KETOROLAC TROMETHAMINE 10 MG: 10 TABLET, FILM COATED ORAL at 14:11

## 2023-01-03 NOTE — Clinical Note
Lord Lobo was seen and treated in our emergency department on 1/3/2023     ?    ? ? Diagnosis: ?    Mace Bilis  ? [place] He may return on this date: ?    Pt seen and examined in the ER  Please excuse for absence 1/2/22, 1/3/23, 1/4/23 due to symptoms  If you have any questions or concerns, please don't hesitate to call        Sarah Fisher,     ______________________________           _______________          _______________  Hospital Representative                              Date                                Time

## 2023-01-03 NOTE — DISCHARGE INSTRUCTIONS
If lidocaine patches are not covered by the pharmacy may obtain over-the-counter at a slightly reduced strength (4% versus prescription 5%) should be similar in efficacy  Medications sent to [facility]  Contact PT for appointment if needed, referral placed

## 2023-01-03 NOTE — ED PROVIDER NOTES
History  Chief Complaint   Patient presents with   • Back Pain     Pt c/o back pain starting [place] day, has gotten worse since then, unable to walk  Back Pain  Associated symptoms: no abdominal pain, no chest pain, no dysuria, no fever, no numbness and no weakness    55-year-old male presenting to the emergency department for evaluation of low back pain  Patient reports atraumatic back pain which began around [place] day, 1 week ago  He reports he was straining over the sink washing dishes for 2 hours he thinks he might of aggravated his back  Denies a history of back surgery does note childhood history of scoliosis and childhood injury that he attributes to intermittent back discomfort throughout his life  Gradual onset of symptoms which is worsening  he reports the symptoms wax and wane he did have improvement at times there are times where he is pain-free but these symptoms have returned today he notes stiff achy discomfort to the left low back with radiation down the posterior left leg  He reports feeling a pulling sensation in the back of his leg whenever he bends forward or raises his leg or tries to touch his toes  He denies any falls or injury  He reports no weakness but has difficulty ambulating at this time secondary to pain  Prior to Admission Medications   Prescriptions Last Dose Informant Patient Reported? Taking?    amLODIPine (NORVASC) 5 mg tablet   No No   Sig: Take 1 tablet by mouth once daily   ibuprofen (MOTRIN) 800 mg tablet   No No   Sig: Take 1 tablet (800 mg total) by mouth every 8 (eight) hours as needed for moderate pain   levETIRAcetam (KEPPRA) 750 mg tablet   No No   Sig: Take 1 tablet by mouth twice daily   triamcinolone (KENALOG) 0 5 % ointment   No No   Sig: APPLY OINTMENT TOPICALLY TWICE DAILY      Facility-Administered Medications: None       Past Medical History:   Diagnosis Date   • Alcohol abuse    • Asthma    • GERD (gastroesophageal reflux disease) w/o esophagitis   • History of cardiovascular stress test 06/18/2018    Exercise stress echo - Frequent PVCs, brief runs of NSVT  Echo portion negative for ischemia  • Hx of echocardiogram 06/18/2018    negative for ischemia   • Hypertension    • PVC's (premature ventricular contractions)    • Seizures ([address] )     Early spring / late winter of 2018 was last and only seizure       Past Surgical History:   Procedure Laterality Date   • CARPAL TUNNEL RELEASE     • CARPAL TUNNEL RELEASE Bilateral    • CYST REMOVAL     • PILONIDAL CYST EXCISION         Family History   Problem Relation Age of Onset   • Stroke Father    • Heart attack Father    • Coronary artery disease Family         Less than 61 yrs of age   • Diabetes Paternal Grandmother    • Seizures Neg Hx      I have reviewed and agree with the history as documented  E-Cigarette/Vaping   • E-Cigarette Use Never User      E-Cigarette/Vaping Substances     Social History     Tobacco Use   • Smoking status: Every Day     Packs/day: 2 00     Types: Cigarettes   • Smokeless tobacco: Never   Vaping Use   • Vaping Use: Never used   Substance Use Topics   • Alcohol use: Yes     Alcohol/week: 6 0 standard drinks     Types: 6 Cans of beer per week     Comment: socially   • Drug use: No       Review of Systems   Constitutional: Negative for chills and fever  HENT: Negative for ear pain and sore throat  Eyes: Negative for pain and visual disturbance  Respiratory: Negative for cough and shortness of breath  Cardiovascular: Negative for chest pain and palpitations  Gastrointestinal: Negative for abdominal pain, nausea and vomiting  Genitourinary: Negative for dysuria and hematuria  Musculoskeletal: Positive for back pain and gait problem  Negative for arthralgias  Skin: Negative for color change and rash  Neurological: Negative for seizures, syncope, weakness and numbness  All other systems reviewed and are negative        Physical Exam  Physical Exam  Vitals and nursing note reviewed  Constitutional:       General: He is not in acute distress  Appearance: He is well-developed  Comments: Appears uncomfortable due to pain   HENT:      Head: Normocephalic and atraumatic  Right Ear: External ear normal       Left Ear: External ear normal       Nose: Nose normal       Mouth/Throat:      Mouth: Mucous membranes are moist       Pharynx: Oropharynx is clear  Eyes:      Conjunctiva/sclera: Conjunctivae normal    Cardiovascular:      Rate and Rhythm: Normal rate and regular rhythm  Pulses: Normal pulses  Heart sounds: Normal heart sounds  No murmur heard  Pulmonary:      Effort: Pulmonary effort is normal  No respiratory distress  Breath sounds: Normal breath sounds  Abdominal:      Palpations: Abdomen is soft  Tenderness: There is no abdominal tenderness  There is no guarding or rebound  Musculoskeletal:         General: No swelling  Cervical back: Neck supple  Back:       Right lower leg: No edema  Left lower leg: No edema  Skin:     General: Skin is warm and dry  Capillary Refill: Capillary refill takes less than 2 seconds  Neurological:      General: No focal deficit present  Mental Status: He is alert  Mental status is at baseline  Sensory: No sensory deficit  Motor: No weakness        Gait: Gait abnormal       Deep Tendon Reflexes: Reflexes normal    Psychiatric:         Mood and Affect: Mood normal          Vital Signs  ED Triage Vitals [01/03/23 1106]   Temperature Pulse Respirations Blood Pressure SpO2   97 5 °F (36 4 °C) 86 18 163/89 95 %      Temp Source Heart Rate Source Patient Position - Orthostatic VS BP Location FiO2 (%)   Temporal Monitor Standing Left arm --      Pain Score       10 - Worst Possible Pain           Vitals:    01/03/23 1106   BP: 163/89   Pulse: 86   Patient Position - Orthostatic VS: Standing         Visual Acuity      ED Medications  Medications methocarbamol (ROBAXIN) tablet 1,000 mg (has no administration in time range)   lidocaine (LIDODERM) 5 % patch 1 patch (has no administration in time range)   ketorolac (TORADOL) tablet 10 mg (has no administration in time range)   oxyCODONE-acetaminophen (PERCOCET) 5-325 mg per tablet 1 tablet (has no administration in time range)       Diagnostic Studies  Results Reviewed     None                 No orders to display              Procedures  Procedures         ED Course                                             Medical Decision Making  64-year-old male presenting for new onset low back pain without history of traumatic injury  Does note he aggravated it while washing dishes  No red flag symptoms at this time such as a history of cancer unexpected weight changes trauma, midline tenderness, focal neurologic deficits  Differential includes musculoskeletal skeletal etiologies including: Lumbar sprain, strain, fracture  Less likely to represent acute spinal cord compression given the waxing and waning quality and lack of other associated features such as neurologic deficits or urinary retention  We will treat patient's pain in the ER and monitor for improvement  Anticipate outpatient management and follow-up return precautions discussed for worsening condition or concerning symptoms    Risk  OTC drugs  Prescription drug management  Decision regarding hospitalization  Patient reassessed after oral pain medication he did not want any injections he also did not want to receive any opioid medications and refused Percocet ordered  He reports significant improvement he was found ambulatory at the room requesting to leave at this time  We will send similar prescriptions to the pharmacy return precautions discussed      Disposition  Final diagnoses:   None     ED Disposition     None      Follow-up Information    None         Patient's Medications   Discharge Prescriptions    No medications on file       No discharge procedures on file      PDMP Review     None          ED Provider  Electronically Signed by           Yolanda Fan DO  01/03/23 8482

## 2023-01-04 NOTE — PROGRESS NOTES
PT Evaluation     Today's date: 2023  Patient name: Glenroy Burgos  : 1980  MRN: [id number]  Referring provider: Rodri Samson, *  Dx:   Encounter Diagnosis     ICD-10-CM    1  Acute left-sided low back pain with left-sided sciatica  M54 42                      Assessment  Assessment details: The patient is a 39 y/o male who presents to PT with diagnosis of L sided LBP with L side sciatica  He has complaints of constant pain across L side lower back and into L anterior thigh  He denies any RLE pain, denies N&T into LLE  He demonstrates deficits with decreased L/S and LLE ROM and strength, decreased flexibility, decreased postural awareness, difficulty sleeping, difficulty with stair negotiation, difficulty with transfers and pain with completing his ADLs and tasks at home  He is I with all his ADLs though he has pain and difficulty with completing them  More pain noted with tasks in which he has to bend forward, such as picking an item up from the floor or putting on his shoes and socks  He ambulates with slow maría and decreased weight shift to LLE in stance phase  In sitting, he leans to R side to unweight L hip  He has difficulty with stair negotiation and can go up and down the steps with either reciprocal or non-reciprocal gait pattern depending on level of pain  Difficulty sleeping is noted - pain can wake him up overnight or he has a hard time finding a comfortable position to sleep  He did demonstrate a directional preference to L/S extension with decreased symptoms  He is currently off work  Secondary to pain and above deficits he is limited with his overall mobility and function  The patient would benefit from continued PT to address deficits and improve function  Tx to include ROM, stretching, strengthening, modalities, HEP, pt education, postural ed, lifting/body mechanics, neuro re-ed, balance/proprioception Te, MT and equipment        Impairments: abnormal gait, abnormal or restricted ROM, activity intolerance, impaired physical strength, lacks appropriate home exercise program, pain with function, weight-bearing intolerance, poor posture  and poor body mechanics  Other impairment: decreased flexibility  Functional limitations: difficulty with stair negotiationUnderstanding of Dx/Px/POC: good   Prognosis: good    Goals  STGs:  1  Initiate and complete HEP with verbal cues  2   Improve LLE ROM by 5-10 degrees in 4 weeks  3   Improve LLE strength by 1/2 grade in 4 weeks  4   Decrease LBP by > 25% in 2 weeks and patient will report centralization of symptoms  LTGs:  1  Patient to be I with HEP in 8 weeks  2   Improve L/S ROM to WNL t/o in 8 weeks to improve function  3   Decrease LBP to < or = to 2-3/10 with activity in 8 weeks to improve function  4   Improve LLE strength to 5/5 t/o in 8 weeks to improve function  5   Improve LLE ROM to WNL t/o in 8 weeks to improve function  6   Postural control is improved to maximal level of function in 8 weeks  7   Stair negotiation is improved to maximal level of function in 8 weeks  8   Recreational performance is improved to maximal level of function in 8 weeks  9   ADL performance is improved to maximal level of function in 8 weeks  10   Work performance is improved to maximal level of function in 8 weeks  11   Patient to report improved sleep in 8 weeks  Plan  Plan details: Modalities and therapy interventions prn      Patient would benefit from: skilled physical therapy  Planned modality interventions: cryotherapy, thermotherapy: hydrocollator packs and unattended electrical stimulation  Other planned modality interventions: IASTM  Planned therapy interventions: abdominal trunk stabilization, manual therapy, behavior modification, body mechanics training, neuromuscular re-education, patient education, postural training, self care, strengthening, stretching, therapeutic activities, therapeutic exercise, flexibility and home exercise program  Frequency: 2x week  Duration in weeks: 8  Plan of Care beginning date: 2023  Plan of Care expiration date: 3/2/2023  Treatment plan discussed with: patient        Subjective Evaluation    History of Present Illness  Mechanism of injury: The patient states that on Reynold morning he was washing dishes for two hours  He feels that from leaning forward when he stood up he felt tension along his lower back  As the day went on the pain got worse  During the week the pain got worse and then on Thursday he stood up and had increased pain and he had went down  [facility]  No imaging completed  He was given medicine and a lidocaine patch  He did not  his additional medicine from the pharmacy yet  He had an appointment this morning with his PCP  At the ER he was also given a script for PT  He now presents for his evaluation  At his appointment with his PCP he was given a referral to have a CT scan, will be having next Wednesday  He will also be getting an x-ray that day  He will be going back to see his PCP next Thursday for his follow up appointment  The patient states that his pain is constant  The pain is along his L side lower back and it radiates into his L groin and into L anterior thigh  He denies any pain below his knee  He denies any N&T into LLE, denies any pain into RLE  Pain  At best pain rating: 3  At worst pain ratin  Location: L LBP - into L groin and down anterior L thigh    Quality: burning, sharp and radiating  Aggravating factors: walking, standing, sitting and lifting    Social Support  Steps to enter house: yes  2  Stairs in house: yes   Lives in: multiple-level home  Lives with: spouse    Employment status: working (Full time  - currently off work)    Diagnostic Tests  No diagnostic tests performed  Patient Goals  Patient goals for therapy: increased motion, decreased pain and increased strength  Patient goal: "To decrease the pain "         Objective     Concurrent Complaints  Positive for disturbed sleep  Static Posture     Lumbar Spine   Decreased lordosis  Postural Observations  Seated posture: fair  Standing posture: fair        Palpation   Left   Tenderness of the erector spinae, lumbar paraspinals and quadratus lumborum  Right   No palpable tenderness to the erector spinae, lumbar paraspinals and quadratus lumborum  Tenderness     Left Hip   No tenderness in the PSIS  Right Hip   No tenderness in the PSIS  Active Range of Motion     Lumbar   Flexion: Active lumbar flexion: 12" from floor  with pain  Extension:  Restriction level: minimal  Left lateral flexion:  with pain Restriction level: minimal  Right lateral flexion:  with pain Restriction level: minimal  Left rotation:  with pain Restriction level: moderate  Right rotation:  with pain Restriction level: moderate  Left Hip   Flexion: 90 degrees with pain    Right Hip   Flexion: 95 degrees   Left Knee   Flexion: WFL  Extension: WFL    Right Knee   Flexion: WFL  Extension: [facility]    Additional Active Range of Motion Details  R SLR: 55  L SLR: 50  Mechanical Assessment    Cervical      Thoracic      Lumbar    Standing flexion:   Pain intensity: worse  Standing extension: repeated movements  Pain intensity: better  Pain level: decreased    Strength/Myotome Testing     Left Hip   Planes of Motion   Flexion: 3+  Abduction: 3+  Adduction: 4-    Right Hip   Planes of Motion   Flexion: WFL  Abduction: WFL  Adduction: WFL    Left Knee   Flexion: 4-  Extension: 3    Right Knee   Flexion: WFL    Ambulation     Ambulation: Level Surfaces   Ambulation without assistive device: independent    Ambulation: Stairs   Ascend stairs: independent  Descend stairs: independent    Additional Stairs Ambulation Details  Either reciprocal or non-reciprocal gait pattern depending on level of pain in his back        Observational Gait Decreased walking speed and left stance time  Functional Assessment      Squat    Sitting toward right side  Precautions: HTN, seizure       Manuals 1/5       BLE                                Neuro Re-Ed         MTP/LTP        PPT        PPT w/marches        PPT w/SLR        PPT w/knee fallouts                Postural Ed ML - posture, spine anatomy & pathology       Ther Ex        NuStep        UBE Retro        Bridges        LTR        SKTC        S/L Hip Abd        Clamshells        SBB HEP       JESUS ALBERTO        PPU        PPU w/auto OP        PPU w/manual OP                Ther Activity                        Gait Training                        Modalities        HP/CP prn                         Access Code: [email or web address]  URL: [email or web address]  Date: 01/05/2023  Prepared by: Ripley Sandifer    Exercises  • Standing Lumbar Extension - 1 x daily - 7 x weekly - 1 sets - 10 reps  Perform every 2-3 hours during the day, to stop if symptoms increase or peripheralize

## 2023-01-05 ENCOUNTER — EVALUATION (OUTPATIENT)
Dept: PHYSICAL THERAPY | Facility: HOME HEALTHCARE | Age: 43
End: 2023-01-05

## 2023-01-05 ENCOUNTER — OFFICE VISIT (OUTPATIENT)
Dept: FAMILY MEDICINE CLINIC | Facility: HOME HEALTHCARE | Age: 43
End: 2023-01-05

## 2023-01-05 ENCOUNTER — TELEPHONE (OUTPATIENT)
Dept: FAMILY MEDICINE CLINIC | Facility: CLINIC | Age: 43
End: 2023-01-05

## 2023-01-05 VITALS
TEMPERATURE: 98.1 F | HEART RATE: 70 BPM | DIASTOLIC BLOOD PRESSURE: 82 MMHG | SYSTOLIC BLOOD PRESSURE: 114 MMHG | BODY MASS INDEX: 30.78 KG/M2 | WEIGHT: 215 LBS | RESPIRATION RATE: 18 BRPM | OXYGEN SATURATION: 98 % | HEIGHT: 70 IN

## 2023-01-05 DIAGNOSIS — R31.29 OTHER MICROSCOPIC HEMATURIA: ICD-10-CM

## 2023-01-05 DIAGNOSIS — M54.42 ACUTE MIDLINE LOW BACK PAIN WITH LEFT-SIDED SCIATICA: Primary | ICD-10-CM

## 2023-01-05 DIAGNOSIS — I10 HTN (HYPERTENSION): ICD-10-CM

## 2023-01-05 DIAGNOSIS — R39.11 URINARY HESITANCY: ICD-10-CM

## 2023-01-05 DIAGNOSIS — R56.9 SEIZURES (HCC): ICD-10-CM

## 2023-01-05 DIAGNOSIS — Z13.220 SCREENING FOR HYPERLIPIDEMIA: ICD-10-CM

## 2023-01-05 DIAGNOSIS — M54.42 ACUTE LEFT-SIDED LOW BACK PAIN WITH LEFT-SIDED SCIATICA: Primary | ICD-10-CM

## 2023-01-05 LAB
SL AMB  POCT GLUCOSE, UA: ABNORMAL
SL AMB LEUKOCYTE ESTERASE,UA: ABNORMAL
SL AMB POCT BILIRUBIN,UA: 1
SL AMB POCT BLOOD,UA: ABNORMAL
SL AMB POCT CLARITY,UA: ABNORMAL
SL AMB POCT COLOR,UA: ABNORMAL
SL AMB POCT KETONES,UA: ABNORMAL
SL AMB POCT NITRITE,UA: ABNORMAL
SL AMB POCT PH,UA: 6
SL AMB POCT SPECIFIC GRAVITY,UA: 1.03
SL AMB POCT URINE PROTEIN: 15
SL AMB POCT UROBILINOGEN: 0.2

## 2023-01-05 RX ORDER — AMLODIPINE BESYLATE 5 MG/1
5 TABLET ORAL DAILY
Qty: 90 TABLET | Refills: 2 | Status: SHIPPED | OUTPATIENT
Start: 2023-01-05

## 2023-01-05 RX ORDER — LEVETIRACETAM 750 MG/1
750 TABLET ORAL 2 TIMES DAILY
Qty: 180 TABLET | Refills: 3 | Status: SHIPPED | OUTPATIENT
Start: 2023-01-05 | End: 2023-04-05

## 2023-01-05 NOTE — TELEPHONE ENCOUNTER
I would like him to start therapy and the medications that were prescribed to him and see if it helps  If he feels by Monday he cannot return give us a call and we can extend  Thanks!

## 2023-01-05 NOTE — TELEPHONE ENCOUNTER
Patient called the office after he left for his appointment and wanted to know when you wanted him to return to work as the ER said he could return Monday but now he has those test to do next week  Chirag Lopez

## 2023-01-05 NOTE — PROGRESS NOTES
Assessment/Plan:    No problem-specific Assessment & Plan notes found for this encounter  Diagnoses and all orders for this visit:    Acute midline low back pain with left-sided sciatica  Comments:  PT visit today at 1PM  start robaxin, toradol  pt confirmed pharmacy as medications ready  Orders:  -     XR spine lumbar minimum 4 views non injury; Future    Seizures (HCC)  -     levETIRAcetam (KEPPRA) 750 mg tablet; Take 1 tablet (750 mg total) by mouth 2 (two) times a day    HTN (hypertension)  Comments:  at goal, continue current  Orders:  -     amLODIPine (NORVASC) 5 mg tablet; Take 1 tablet (5 mg total) by mouth daily  -     TSH, 3rd generation with Free T4 reflex; Future  -     Microalbumin / creatinine urine ratio    Screening for hyperlipidemia  -     Lipid panel; Future    Urinary hesitancy  Comments:  UA with protein and blood  will check CT r/o stone  if normal will refer to urology for hematuria evalaution  Orders:  -     UA w Reflex to Microscopic w Reflex to Culture -Lab Collect; Future  -     POCT urine dip  -     CT renal stone study abdomen pelvis wo contrast; Future    Other microscopic hematuria  -     CT renal stone study abdomen pelvis wo contrast; Future        RTC 1 week     Subjective:      Patient ID: Lon Griggs is a 38 y o  male PMH HTN presents for ER follow up  Notes continued left sided back pain that is still present  Worse with walking and motions  He states it travels down his left leg and into his thigh  He was evaluated at ER and prescribed toradol, robaxin and lidocaine patches  He states he has not yet picked them up at the pharmacy but bought OTC lidocaine patches which appear to be helping  He denies incontinence but states he feels as though he has had some urinary hesitancy and pain that radiates into his groin and testicles  No nausea/vomiting/abdominal pain  No flank pain       HPI    The following portions of the patient's history were reviewed and updated as appropriate: allergies, current medications, past family history, past medical history, past social history, past surgical history and problem list     Review of Systems   Constitutional: Negative  Respiratory: Negative  Cardiovascular: Negative  Gastrointestinal: Negative  Genitourinary: Positive for difficulty urinating, hematuria, testicular pain and urgency  Negative for decreased urine volume, dysuria, enuresis, flank pain, frequency, genital sores, penile discharge, penile pain, penile swelling and scrotal swelling  Musculoskeletal: Positive for back pain, gait problem and myalgias  Objective:      /82   Pulse 70   Temp 98 1 °F (36 7 °C)   Resp 18   Ht 5' 10" (1 778 m)   Wt 97 5 kg (215 lb)   SpO2 98%   BMI 30 85 kg/m²          Physical Exam  Vitals reviewed  Constitutional:       General: He is not in acute distress  Appearance: Normal appearance  He is well-developed  HENT:      Head: Normocephalic and atraumatic  Eyes:      Conjunctiva/sclera: Conjunctivae normal       Pupils: Pupils are equal, round, and reactive to light  Neck:      Thyroid: No thyromegaly  Cardiovascular:      Rate and Rhythm: Normal rate and regular rhythm  Heart sounds: Normal heart sounds  No murmur heard  Pulmonary:      Effort: Pulmonary effort is normal  No respiratory distress  Breath sounds: Normal breath sounds  No wheezing  Abdominal:      General: Bowel sounds are normal  There is no distension  Palpations: Abdomen is soft  Tenderness: There is no abdominal tenderness  Musculoskeletal:      Cervical back: Normal, normal range of motion and neck supple  Thoracic back: Normal       Lumbar back: Spasms and tenderness present  No swelling or bony tenderness  Decreased range of motion  Positive left straight leg raise test  Negative right straight leg raise test  No scoliosis  Back:       Right lower leg: No edema        Left lower leg: No edema    Skin:     General: Skin is warm and dry  Neurological:      Mental Status: He is alert and oriented to person, place, and time  Mental status is at baseline  Psychiatric:         Mood and Affect: Mood normal          Behavior: Behavior normal          Thought Content:  Thought content normal          Judgment: Judgment normal

## 2023-01-06 LAB
CREAT UR-MCNC: 323 MG/DL
MICROALBUMIN UR-MCNC: 20.9 MG/L (ref 0–20)
MICROALBUMIN/CREAT 24H UR: 6 MG/G CREATININE (ref 0–30)

## 2023-01-11 ENCOUNTER — HOSPITAL ENCOUNTER (OUTPATIENT)
Dept: CT IMAGING | Facility: HOSPITAL | Age: 43
Discharge: HOME | End: 2023-01-11

## 2023-01-11 ENCOUNTER — HOSPITAL ENCOUNTER (OUTPATIENT)
Dept: RADIOLOGY | Facility: HOSPITAL | Age: 43
Discharge: HOME | End: 2023-01-11

## 2023-01-11 DIAGNOSIS — R31.29 OTHER MICROSCOPIC HEMATURIA: ICD-10-CM

## 2023-01-11 DIAGNOSIS — M54.42 ACUTE MIDLINE LOW BACK PAIN WITH LEFT-SIDED SCIATICA: ICD-10-CM

## 2023-01-11 DIAGNOSIS — R39.11 URINARY HESITANCY: ICD-10-CM

## 2023-01-12 ENCOUNTER — APPOINTMENT (OUTPATIENT)
Dept: PHYSICAL THERAPY | Facility: HOME HEALTHCARE | Age: 43
End: 2023-01-12

## 2023-01-12 ENCOUNTER — OFFICE VISIT (OUTPATIENT)
Dept: FAMILY MEDICINE CLINIC | Facility: HOME HEALTHCARE | Age: 43
End: 2023-01-12

## 2023-01-12 VITALS
RESPIRATION RATE: 18 BRPM | HEIGHT: 70 IN | BODY MASS INDEX: 31.21 KG/M2 | OXYGEN SATURATION: 98 % | TEMPERATURE: 98.6 F | DIASTOLIC BLOOD PRESSURE: 88 MMHG | HEART RATE: 76 BPM | SYSTOLIC BLOOD PRESSURE: 136 MMHG | WEIGHT: 218 LBS

## 2023-01-12 DIAGNOSIS — M54.42 ACUTE MIDLINE LOW BACK PAIN WITH LEFT-SIDED SCIATICA: Primary | ICD-10-CM

## 2023-01-12 DIAGNOSIS — R31.29 OTHER MICROSCOPIC HEMATURIA: ICD-10-CM

## 2023-01-12 LAB
SL AMB  POCT GLUCOSE, UA: NEGATIVE
SL AMB LEUKOCYTE ESTERASE,UA: NEGATIVE
SL AMB POCT BILIRUBIN,UA: NEGATIVE
SL AMB POCT BLOOD,UA: NEGATIVE
SL AMB POCT CLARITY,UA: CLEAR
SL AMB POCT COLOR,UA: YELLOW
SL AMB POCT KETONES,UA: NEGATIVE
SL AMB POCT NITRITE,UA: NEGATIVE
SL AMB POCT PH,UA: 5
SL AMB POCT SPECIFIC GRAVITY,UA: 1
SL AMB POCT URINE PROTEIN: NEGATIVE
SL AMB POCT UROBILINOGEN: 0.2

## 2023-01-12 NOTE — LETTER
January 12, 2023     Patient: Dimas Nava  YOB: 1980  Date of Visit: 1/12/2023      To Whom it May Concern:    Kim Go is under my professional care  Tu De Jesus was seen in my office on 1/5/2023 and  1/12/2023  Tu De Jesus was unable to work 12/24/2022 through 1/22/2023 secondary to a back injury  He is able to tentatively return 1/23/2023  If you have any questions or concerns, please don't hesitate to call           Sincerely,          Marie Morales PA-C        CC: No Recipients

## 2023-01-12 NOTE — PROGRESS NOTES
Assessment/Plan:    No problem-specific Assessment & Plan notes found for this encounter  Diagnoses and all orders for this visit:    Acute midline low back pain with left-sided sciatica  Comments:  continue PT, icy hot and lidocaine patch  FMLA paperwork completed     Other microscopic hematuria  Comments:  resolved on today's UA  Orders:  -     POCT urine dip          FUP 1 month     Subjective:      Patient ID: Tonny Pink is a 38 y o  male PMH acute midline LBP w/ left sciatica presents for follow up  LBP continues but is good relief with lidocaine patches and icy hot  He has not picked up his robaxin as he wants to avoid taking pills  He is following with PT  Notes worst pain at 4/10 with awkward movements  Denies bowel/bladder concerns  CT and xray pending formal read  HPI    The following portions of the patient's history were reviewed and updated as appropriate: allergies, current medications, past family history, past medical history, past social history, past surgical history and problem list     Review of Systems   Constitutional: Negative  HENT: Negative  Respiratory: Negative  Cardiovascular: Negative  Gastrointestinal: Negative  Genitourinary: Negative  Musculoskeletal: Positive for arthralgias, back pain and gait problem  Objective:      /88 (BP Location: Left arm, Patient Position: Sitting, Cuff Size: Adult)   Pulse 76   Temp 98 6 °F (37 °C) (Temporal)   Resp 18   Ht 5' 10" (1 778 m)   Wt 98 9 kg (218 lb)   SpO2 98%   BMI 31 28 kg/m²          Physical Exam  Vitals reviewed  Constitutional:       General: He is not in acute distress  Appearance: Normal appearance  He is well-developed  HENT:      Head: Normocephalic and atraumatic  Eyes:      Conjunctiva/sclera: Conjunctivae normal       Pupils: Pupils are equal, round, and reactive to light  Neck:      Thyroid: No thyromegaly     Cardiovascular:      Rate and Rhythm: Normal rate and regular rhythm  Heart sounds: Normal heart sounds  No murmur heard  Pulmonary:      Effort: Pulmonary effort is normal  No respiratory distress  Breath sounds: Normal breath sounds  No wheezing  Abdominal:      General: Bowel sounds are normal  There is no distension  Palpations: Abdomen is soft  Tenderness: There is no abdominal tenderness  Musculoskeletal:      Cervical back: Normal, normal range of motion and neck supple  Thoracic back: Normal       Lumbar back: Spasms, tenderness and bony tenderness present  No swelling or deformity  Decreased range of motion  Positive left straight leg raise test       Right lower leg: No edema  Left lower leg: No edema  Skin:     General: Skin is warm and dry  Neurological:      Mental Status: He is alert and oriented to person, place, and time  Mental status is at baseline  Psychiatric:         Mood and Affect: Mood normal          Behavior: Behavior normal          Thought Content:  Thought content normal          Judgment: Judgment normal

## 2023-01-16 ENCOUNTER — OFFICE VISIT (OUTPATIENT)
Dept: PHYSICAL THERAPY | Facility: HOME HEALTHCARE | Age: 43
End: 2023-01-16

## 2023-01-16 DIAGNOSIS — M54.42 ACUTE LEFT-SIDED LOW BACK PAIN WITH LEFT-SIDED SCIATICA: Primary | ICD-10-CM

## 2023-01-16 NOTE — PROGRESS NOTES
Daily Note     Today's date: 2023  Patient name: Kwame Collazo  : 1980  MRN: [id number]  Referring provider: Elsa Montgomery, *  Dx:   Encounter Diagnosis     ICD-10-CM    1  Acute left-sided low back pain with left-sided sciatica  M54 42                  Subjective: Pt reports pain L side of his LB  Objective: See treatment diary below      Assessment: Tolerated treatment fair  Verbal cues needed t/o exercise to perform correctly  Pain level same t/o exercise  Decreased flexibility noted B LE with stretching  Pt notes discomfort/pain in his back with positional changes  Patient would benefit from continued PT      Plan: Continue per plan of care            Precautions: HTN, seizure       Manuals       BLE  8'                              Neuro Re-Ed         MTP/LTP  Green 1x15 ea       PPT  5" x 15      PPT w/marches        PPT w/SLR        PPT w/knee fallouts                Postural Ed ML - posture, spine anatomy & pathology       Ther Ex        NuStep  L1 13'       UBE Retro        Bridges        LTR  5"x10      SKTC  5"x5      S/L Hip Abd        Clamshells        SBB HEP 5"x10      JESUS ALBERTO        PPU        PPU w/auto OP        PPU w/manual OP                Ther Activity                        Gait Training                        Modalities        HP/CP prn  Pt declined

## 2023-02-03 ENCOUNTER — RA CDI HCC (OUTPATIENT)
Dept: OTHER | Facility: HOSPITAL | Age: 43
End: 2023-02-03

## 2023-02-03 NOTE — PROGRESS NOTES
[place] [phone]  coding opportunities       Chart reviewed, no opportunity found: CHART REVIEWED, NO OPPORTUNITY FOUND        Patients Insurance        Commercial Insurance: [address]     [place] [phone]  coding opportunities       Chart reviewed, no opportunity found: CHART REVIEWED, NO OPPORTUNITY FOUND        Patients Insurance        Commercial Insurance: [address] [place] [address]

## 2023-03-08 ENCOUNTER — OFFICE VISIT (OUTPATIENT)
Dept: FAMILY MEDICINE CLINIC | Facility: HOME HEALTHCARE | Age: 43
End: 2023-03-08

## 2023-03-08 ENCOUNTER — NURSE TRIAGE (OUTPATIENT)
Dept: OTHER | Facility: OTHER | Age: 43
End: 2023-03-08

## 2023-03-08 VITALS
WEIGHT: 213 LBS | TEMPERATURE: 97.1 F | RESPIRATION RATE: 18 BRPM | DIASTOLIC BLOOD PRESSURE: 98 MMHG | HEART RATE: 59 BPM | BODY MASS INDEX: 30.49 KG/M2 | HEIGHT: 70 IN | OXYGEN SATURATION: 97 % | SYSTOLIC BLOOD PRESSURE: 146 MMHG

## 2023-03-08 DIAGNOSIS — M54.42 ACUTE MIDLINE LOW BACK PAIN WITH LEFT-SIDED SCIATICA: Primary | ICD-10-CM

## 2023-03-08 DIAGNOSIS — M51.36 LUMBAR DEGENERATIVE DISC DISEASE: ICD-10-CM

## 2023-03-08 PROBLEM — L03.90 CELLULITIS: Status: RESOLVED | Noted: 2022-11-14 | Resolved: 2023-03-08

## 2023-03-08 PROBLEM — M51.369 LUMBAR DEGENERATIVE DISC DISEASE: Status: ACTIVE | Noted: 2023-03-08

## 2023-03-08 RX ORDER — METHOCARBAMOL 500 MG/1
500 TABLET, FILM COATED ORAL 2 TIMES DAILY
Qty: 20 TABLET | Refills: 0 | Status: SHIPPED | OUTPATIENT
Start: 2023-03-08

## 2023-03-08 RX ORDER — METHYLPREDNISOLONE 4 MG/1
TABLET ORAL
Qty: 21 TABLET | Refills: 0 | Status: SHIPPED | OUTPATIENT
Start: 2023-03-08

## 2023-03-08 NOTE — LETTER
March 8, 2023     Patient: Iam De La Rosa  YOB: 1980  Date of Visit: 3/8/2023      To Whom it May Concern:    Shimon Castillo is under my professional care  Kay Conde was seen in my office on 3/8/2023  Kay Conde may return to work on 3/9/2023  If you have any questions or concerns, please don't hesitate to call           Sincerely,          Rosa Elena Khalil PA-C        CC: No Recipients

## 2023-03-08 NOTE — PROGRESS NOTES
Assessment/Plan:     Diagnoses and all orders for this visit:    Acute midline low back pain with left-sided sciatica  -     Ambulatory Referral to Pain Management; Future  -     methylPREDNISolone 4 MG tablet therapy pack; Use as directed on package  -     methocarbamol (ROBAXIN) 500 mg tablet; Take 1 tablet (500 mg total) by mouth 2 (two) times a day    Lumbar degenerative disc disease  -     Ambulatory Referral to Pain Management; Future      - Will treat with Medrol Dosepak and robaxin for acute symptoms  Recommend follow up with spine & pain for further evaluation/treatment, referral provided  Return if symptoms worsen or fail to improve  Subjective:        Patient ID: Madelyn Montes is a 38 y o  male  Chief Complaint   Patient presents with   • Back Pain     Started around [place], Was seen at the ER around that time       João Simon is a 38year old male with history of HTN, GERD, seizures, and alcoholism, presenting with concern for back pain  Patient has been experiencing low back pain x 2 months  He was evaluated in the ED 1/3/23 and prescribed toradol, robaxin, and lidocaine patches  Followed up in the office 1/5 at which time lumbar spine xray was ordered and revealed degenerative disc disease at L2-L3 and L5-S1  At follow up on 1/12 patient stated he had only been using icy hot and topical lidocaine patches for pain as he did not want to take any pills  Pain was slightly improved at that time  Patient has since attended 2 PT sessions and continues to use topical remedies but presents today with worsening pain x 3 days  Endorses pain radiating down his left leg with associated tingling in his toes  States he needs the problem "fixed" because he needs to be able to work  Is currently using Biofreeze, lidocaine patches, and OTC pain relievers without improvement        The following portions of the patient's history were reviewed and updated as appropriate: allergies, current medications, past family history, past medical history, past social history, past surgical history and problem list     Patient Active Problem List   Diagnosis   • Alcohol withdrawal seizure without complication ([email or web address] )   • Essential hypertension   • GERD without esophagitis   • Seizures ([facility] [[email or web address] 75 )   • Alcohol abuse   • Venous hypertension, chronic, with ulcer and inflammation[facility] ([facility] )   • Infection of wound due to methicillin resistant Staphylococcus aureus (MRSA)   • Lumbar degenerative disc disease       Current Outpatient Medications   Medication Sig Dispense Refill   • amLODIPine (NORVASC) 5 mg tablet Take 1 tablet (5 mg total) by mouth daily 90 tablet 2   • levETIRAcetam (KEPPRA) 750 mg tablet Take 1 tablet (750 mg total) by mouth 2 (two) times a day 180 tablet 3   • lidocaine (Lidoderm) 5 % Apply 1 patch topically daily Remove & Discard patch within 12 hours or as directed by MD 6 patch 0   • methocarbamol (ROBAXIN) 500 mg tablet Take 1 tablet (500 mg total) by mouth 2 (two) times a day 20 tablet 0   • methylPREDNISolone 4 MG tablet therapy pack Use as directed on package 21 tablet 0   • ibuprofen (MOTRIN) 800 mg tablet Take 1 tablet (800 mg total) by mouth every 8 (eight) hours as needed for moderate pain (Patient not taking: Reported on 3/8/2023) 21 tablet 0   • ketorolac (TORADOL) 10 mg tablet Take 1 tablet (10 mg total) by mouth every 6 (six) hours as needed for moderate pain for up to 5 days 20 tablet 0     No current facility-administered medications for this visit  Past Medical History:   Diagnosis Date   • Alcohol abuse    • Asthma    • GERD (gastroesophageal reflux disease)     w/o esophagitis   • History of cardiovascular stress test 06/18/2018    Exercise stress echo - Frequent PVCs, brief runs of NSVT  Echo portion negative for ischemia     • Hx of echocardiogram 06/18/2018    negative for ischemia   • Hypertension    • PVC's (premature ventricular contractions)    • Seizures ([email or web address] 75 ) Early spring / late winter of 2018 was last and only seizure        Past Surgical History:   Procedure Laterality Date   • CARPAL TUNNEL RELEASE     • CARPAL TUNNEL RELEASE Bilateral    • CYST REMOVAL     • PILONIDAL CYST EXCISION          Social History     Socioeconomic History   • Marital status: /Civil Union     Spouse name: Not on file   • Number of children: Not on file   • Years of education: Not on file   • Highest education level: Not on file   Occupational History   • Not on file   Tobacco Use   • Smoking status: Every Day     Packs/day: 2 00     Types: Cigarettes   • Smokeless tobacco: Never   Vaping Use   • Vaping Use: Never used   Substance and Sexual Activity   • Alcohol use: Yes     Alcohol/week: 6 0 standard drinks     Types: 6 Cans of beer per week     Comment: socially   • Drug use: No   • Sexual activity: Yes     Partners: Female   Other Topics Concern   • Not on file   Social History Narrative    ** Merged History Encounter **          Social Determinants of Health     Financial Resource Strain: Not on file   Food Insecurity: Not on file   Transportation Needs: Not on file   Physical Activity: Not on file   Stress: Not on file   Social Connections: Not on file   Intimate Partner Violence: Not on file   Housing Stability: Not on file        Review of Systems   Constitutional: Negative for chills, diaphoresis and fever  Respiratory: Negative for cough, chest tightness, shortness of breath and wheezing  Cardiovascular: Negative for chest pain, palpitations and leg swelling  Gastrointestinal: Negative for abdominal pain, constipation, diarrhea, nausea and vomiting  Musculoskeletal: Positive for back pain and myalgias  Skin: Negative for rash and wound  Neurological: Negative for dizziness, syncope, light-headedness and headaches           Objective:      /98 (BP Location: Left arm, Patient Position: Sitting, Cuff Size: Standard)   Pulse 59   Temp (!) 97 1 °F (36 2 °C) (Tympanic)   Resp 18   Ht 5' 10" (1 778 m)   Wt 96 6 kg (213 lb)   SpO2 97%   BMI 30 56 kg/m²          Physical Exam  Vitals and nursing note reviewed  Constitutional:       General: He is not in acute distress  Appearance: Normal appearance  HENT:      Head: Normocephalic and atraumatic  Eyes:      Extraocular Movements: Extraocular movements intact  Conjunctiva/sclera: Conjunctivae normal       Pupils: Pupils are equal, round, and reactive to light  Cardiovascular:      Rate and Rhythm: Normal rate and regular rhythm  Heart sounds: Normal heart sounds  No murmur heard  Pulmonary:      Effort: Pulmonary effort is normal  No respiratory distress  Breath sounds: Normal breath sounds  No wheezing  Musculoskeletal:      Lumbar back: Spasms present  Right lower leg: No edema  Left lower leg: No edema  Skin:     General: Skin is warm and dry  Neurological:      General: No focal deficit present  Mental Status: He is alert and oriented to person, place, and time  Cranial Nerves: No cranial nerve deficit        Gait: Gait abnormal    Psychiatric:         Mood and Affect: Mood normal          Behavior: Behavior normal

## 2023-03-08 NOTE — TELEPHONE ENCOUNTER
Reason for Disposition  • [1] SEVERE back pain (e g , excruciating, unable to do any normal activities) AND [2] not improved 2 hours after pain medicine    Answer Assessment - Initial Assessment Questions  1  ONSET: "When did the pain begin?"       Started getting worse two days ago  2  LOCATION: "Where does it hurt?" (upper, mid or lower back)      Mid and lower  3  SEVERITY: "How bad is the pain?"  (e g , Scale 1-10; mild, moderate, or severe)    - MILD (1-3): doesn't interfere with normal activities     - MODERATE (4-7): interferes with normal activities or awakens from sleep     - SEVERE (8-10): excruciating pain, unable to do any normal activities       10  4  PATTERN: "Is the pain constant?" (e g , yes, no; constant, intermittent)       Constant  5  RADIATION: "Does the pain shoot into your legs or elsewhere?"      Down legs  6  CAUSE:  "What do you think is causing the back pain?"       Recent felt wrench at work  7  BACK OVERUSE:  "Any recent lifting of heavy objects, strenuous work or exercise?"        8  MEDICATIONS: "What have you taken so far for the pain?" (e g , nothing, acetaminophen, NSAIDS)      Denies  9  NEUROLOGIC SYMPTOMS: "Do you have any weakness, numbness, or problems with bowel/bladder control?"      Denies  10   OTHER SYMPTOMS: "Do you have any other symptoms?" (e g , fever, abdominal pain, burning with urination, blood in urine)        Denies    Protocols used: BACK PAIN-ADULT-

## 2023-03-20 ENCOUNTER — CONSULT (OUTPATIENT)
Dept: PAIN MEDICINE | Facility: CLINIC | Age: 43
End: 2023-03-20

## 2023-03-20 VITALS
RESPIRATION RATE: 16 BRPM | HEART RATE: 83 BPM | DIASTOLIC BLOOD PRESSURE: 83 MMHG | SYSTOLIC BLOOD PRESSURE: 143 MMHG | WEIGHT: 209 LBS | BODY MASS INDEX: 29.92 KG/M2 | HEIGHT: 70 IN

## 2023-03-20 DIAGNOSIS — M51.36 LUMBAR DEGENERATIVE DISC DISEASE: ICD-10-CM

## 2023-03-20 DIAGNOSIS — M54.42 ACUTE MIDLINE LOW BACK PAIN WITH LEFT-SIDED SCIATICA: ICD-10-CM

## 2023-03-20 DIAGNOSIS — M51.16 LUMBAR DISC DISEASE WITH RADICULOPATHY: ICD-10-CM

## 2023-03-20 DIAGNOSIS — M47.816 LUMBAR SPONDYLOSIS: Primary | ICD-10-CM

## 2023-03-20 RX ORDER — DULOXETIN HYDROCHLORIDE 30 MG/1
30 CAPSULE, DELAYED RELEASE ORAL DAILY
Qty: 30 CAPSULE | Refills: 1 | Status: SHIPPED | OUTPATIENT
Start: 2023-03-20 | End: 2023-04-19

## 2023-03-20 NOTE — PROGRESS NOTES
Assessment:  1  Lumbar spondylosis    2  Acute midline low back pain with left-sided sciatica    3  Lumbar degenerative disc disease    4  Lumbar disc disease with radiculopathy        Plan:  Patient is a 47-year-old male complains of low back pain and left leg pain chronic patient secondary lumbar degenerative disc disease, lumbar sclerosis, lumbar spondylosis, lumbar radiculopathy presents to office for initial consultation  Patient reports burning, cramping, shooting, throbbing pain traveling into the left lower extremity in the L5 and S1 nerve root distribution  X-ray of lumbar spine shows loss of disc height at L2-L3 with facet sclerosis at L5-S1 and degenerative disc changes at both levels  Patient has also had a vas lower limb venous duplex study which does show deep venous incompetence in the common femoral and popliteal, great saphenous of the left lower extremity  There is a high probability of a vascular insufficiency correlate with patient's left leg pain versus lumbar radiculopathy  Will need more diagnostic imaging  1   We will provide patient with a home exercise regimen for lumbar core strengthening exercises  2   We will follow-up in 6 weeks to gauge response to home exercise regimen patient shows no significant improvement in radicular symptoms and low back pain we will then order an MRI of the lumbar spine  3  We will trial Cymbalta 30 mg p o  nightly for lumbar radicular symptoms    [place] Prescription Drug Monitoring Program report was reviewed and was appropriate     History of Present Illness: The patient is a 38 y o  male who presents for consultation in regards to Back Pain and Leg Pain (LEFT SIDED)  Symptoms have been present for 1 year  Symptoms began following a non work related injury  Pain is reported to be 7 on the numeric rating scale  Symptoms are felt nearly constantly and worst in the no typical pattern    Symptoms are characterized as burning, cramping, sharp, numbing, tingling and throbbing  Symptoms are associated with left leg weakness  Aggravating factors include kneeling, bending, leaning forward, leaning bckward, sitting, walking and coughing/sneezing  Relieving factors include lying down  No change in symptoms with kneeling, standing, turning the head, relaxation and bowel movements  Treatments that have been helpful include   Medications to relieve symptoms include none  Review of Systems:    Review of Systems   Musculoskeletal: Positive for back pain  All other systems reviewed and are negative  Past Medical History:   Diagnosis Date   • Alcohol abuse    • Asthma    • GERD (gastroesophageal reflux disease)     w/o esophagitis   • History of cardiovascular stress test 06/18/2018    Exercise stress echo - Frequent PVCs, brief runs of NSVT  Echo portion negative for ischemia  • Hx of echocardiogram 06/18/2018    negative for ischemia   • Hypertension    • PVC's (premature ventricular contractions)    • Seizures ([facility] )     Early spring / late winter of 2018 was last and only seizure       Past Surgical History:   Procedure Laterality Date   • CARPAL TUNNEL RELEASE     • CARPAL TUNNEL RELEASE Bilateral    • CYST REMOVAL     • PILONIDAL CYST EXCISION         Family History   Problem Relation Age of Onset   • Stroke Father    • Heart attack Father    • Coronary artery disease Family         Less than 61 yrs of age   • Diabetes Paternal Grandmother    • Seizures Neg Hx        Social History     Occupational History   • Not on file   Tobacco Use   • Smoking status: Every Day     Packs/day: 2 00     Types: Cigarettes   • Smokeless tobacco: Never   Vaping Use   • Vaping Use: Never used   Substance and Sexual Activity   • Alcohol use:  Yes     Alcohol/week: 6 0 standard drinks     Types: 6 Cans of beer per week     Comment: socially   • Drug use: No   • Sexual activity: Yes     Partners: Female         Current Outpatient Medications:   •  amLODIPine (NORVASC) 5 mg tablet, Take 1 tablet (5 mg total) by mouth daily, Disp: 90 tablet, Rfl: 2  •  ketorolac (TORADOL) 10 mg tablet, Take 1 tablet (10 mg total) by mouth every 6 (six) hours as needed for moderate pain for up to 5 days, Disp: 20 tablet, Rfl: 0  •  levETIRAcetam (KEPPRA) 750 mg tablet, Take 1 tablet (750 mg total) by mouth 2 (two) times a day, Disp: 180 tablet, Rfl: 3  •  ibuprofen (MOTRIN) 800 mg tablet, Take 1 tablet (800 mg total) by mouth every 8 (eight) hours as needed for moderate pain (Patient not taking: Reported on 3/8/2023), Disp: 21 tablet, Rfl: 0  •  lidocaine (Lidoderm) 5 %, Apply 1 patch topically daily Remove & Discard patch within 12 hours or as directed by MD (Patient not taking: Reported on 3/20/2023), Disp: 6 patch, Rfl: 0  •  methocarbamol (ROBAXIN) 500 mg tablet, Take 1 tablet (500 mg total) by mouth 2 (two) times a day (Patient not taking: Reported on 3/20/2023), Disp: 20 tablet, Rfl: 0  •  methylPREDNISolone 4 MG tablet therapy pack, Use as directed on package (Patient not taking: Reported on 3/20/2023), Disp: 21 tablet, Rfl: 0    Allergies   Allergen Reactions   • Nonoxynol 9 Other (See Comments)     Childhood reaction - pt unsure of reaction       Physical Exam:    /83   Pulse 83   Resp 16   Ht 5' 10" (1 778 m)   Wt 94 8 kg (209 lb)   BMI 29 99 kg/m²     Constitutional: normal, well developed, well nourished, alert, in no distress and non-toxic and no overt pain behavior  Eyes: anicteric  HEENT: grossly intact  Neck: supple, symmetric, trachea midline and no masses   Pulmonary:even and unlabored  Cardiovascular:No edema or pitting edema present  Skin:Normal without rashes or lesions and well hydrated  Psychiatric:Mood and affect appropriate  Neurologic:Cranial Nerves II-XII grossly intact  Musculoskeletal:antalgic     Lumbar/Sacral Spine examination demonstrates    Decreased range of motion lumbar spine with pain upon: flexion, lateral rotation to the left/right, and bending to the left/right  Bilateral lumbar paraspinals tender to palpation  Muscle spasms noted in the lumbar area bilaterally  4/5 left lower extremity strength in all muscle groups  Positive seated straight leg raise for bilateral lower extremities  Sensitivity to light touch intact bilateral lower extremities  2+ reflexes in the patella and Achilles  No ankle clonus    Imaging    Narrative & Impression   LUMBAR SPINE     INDICATION:   [id number]: Lumbago with sciatica, left side      COMPARISON:  None     VIEWS:  [facility] SPINE LUMBAR MINIMUM 4 VIEWS NON INJURY        FINDINGS:     There are 5 non rib bearing lumbar vertebral bodies       There is no evidence of acute fracture or destructive osseous lesion  Anterior superior endplate sclerosis at L3  Alignment is unremarkable       Loss of disc height at L2-L3  Facet sclerosis at L5-S1      The pedicles appear intact      Soft tissues are unremarkable      IMPRESSION:  Degenerative disc disease at L2-L3 and L5-S1  No orders to display       No orders of the defined types were placed in this encounter

## 2023-03-20 NOTE — PATIENT INSTRUCTIONS
Core Strengthening Exercises (3 sets, 12 repetitions, 4 days a week)  WHAT YOU NEED TO KNOW:   What do I need to know about core strengthening exercises? Your core includes the muscles of your lower back, hip, pelvis, and abdomen  Core strengthening exercises help heal and strengthen these muscles  This helps prevent another injury, and keeps your pelvis, spine, and hips in the correct position  What do I need to know about exercise safety? Talk to your healthcare provider before you start an exercise program  A physical therapist can teach you how to do core strengthening exercises safely  Do the exercises on a mat or firm surface  A firm surface will support your spine and prevent low back pain  Do not do these exercises on a bed  Move slowly and smoothly  Avoid fast or jerky motions  Stop if you feel pain  You may feel some discomfort at first, but you should not feel pain  Tell your provider or physical therapist if you have pain while you exercise  Regular exercise will help decrease your discomfort over time  Breathe normally during core exercises  Do not hold your breath  This may cause an increase in blood pressure and prevent muscle strengthening  Your healthcare provider will tell you when to inhale and exhale during the exercise  Begin all of your exercises with abdominal bracing  Abdominal bracing helps warm up your core muscles  You can also practice abdominal bracing throughout the day  Lie on your back with your knees bent and feet flat on the floor  Place your arms in a relaxed position beside your body  Tighten your abdominal muscles  Pull your belly button in and up toward your spine  Hold for 5 seconds  Relax your muscles  Repeat 10 times  How do I perform core strengthening exercises? Your healthcare provider will tell you how often to do these exercises  The provider will also tell you how many repetitions of each exercise you should do   Hold each exercise for 5 seconds or as directed  As you get stronger, increase your hold to 10 to 15 seconds  You can do some of these exercises on a stability ball, or with a weight  Ask your healthcare provider how to use a stability ball or weight for these exercises:  Bridging:  Lie on your back with your knees bent and feet flat on the floor  Rest your arms at your side  Tighten your buttocks, and then lift your hips 1 inch off the floor  Hold for 5 seconds  When you can do this exercise without pain for 10 seconds, increase the distance you lift your hips  A good goal is to be able to lift your hips so that your shoulders, hips, and knees are in a straight line  Dead bug:  Lie on your back with your knees bent and feet flat on the floor  Place your arms in a relaxed position beside your body  Begin with abdominal bracing  Next, raise one leg, keeping your knee bent  Hold for 5 seconds  Repeat with the other leg  When you can do this exercise without pain for 10 to 15 seconds, you may raise one straight leg and hold  Repeat with the other leg  Quadruped:  Place your hands and knees on the floor  Keep your wrists directly below your shoulders and your knees directly below your hips  Pull your belly button in toward your spine  Do not flatten or arch your back  Tighten your abdominal muscles below your belly button  Hold for 5 seconds  When you can do this exercise without pain for 10 to 15 seconds, you may extend one arm and hold  Repeat on the other side  Side bridge exercises:      Standing side bridge:  Stand next to a wall and extend one arm toward the wall  Place your palm flat on the wall with your fingers pointing upward  Begin with abdominal bracing  Next, without moving your feet, slowly bend your arm to 90 degrees  Hold for 5 seconds  Repeat on the other side  When you can do this exercise without pain for 10 to 15 seconds, you may do the bent leg side bridge on the floor           Bent leg side bridge: Lie on one side with your legs, hips, and shoulders in a straight line  Prop yourself up onto your forearm so your elbow is directly below your shoulder  Bend your knees back to 90 degrees  Begin with abdominal bracing  Next, lift your hips and balance yourself on your forearm and knees  Hold for 5 seconds  Repeat on the other side  When you can do this exercise without pain for 10 to 15 seconds, you may do the straight leg side bridge on the floor  Straight leg side bridge:  Lie on one side with your legs, hips, and shoulders in a straight line  Prop yourself up onto your forearm so your elbow is directly below your shoulder  Begin with abdominal bracing  Lift your hips off the floor and balance yourself on your forearm and the outside of your flexed foot  Do not let your ankle bend sideways  Hold for 5 seconds  Repeat on the other side  When you can do this exercise without pain for 10 to 15 seconds, ask your healthcare provider for more advanced exercises  Superman:  Lie on your stomach  Extend your arms forward on the floor  Tighten your abdominal muscles and lift your right hand and left leg off the floor  Hold this position  Slowly return to the starting position  Tighten your abdominal muscles and lift your left hand and right leg off the floor  Hold this position  Slowly return to the starting position  Clam:  Lie on your side with your knees bent  Put your bottom arm under your head to keep your neck in line  Put your top hand on your hip to keep your pelvis from moving  Put your heels together, and keep them together during this exercise  Slowly raise your top knee toward the ceiling  Then lower your leg so your knees are together  Repeat this exercise 10 times  Then switch sides and do the exercise 10 times with the other leg  Curl up:  Lie on your back with your knees bent and feet flat on the floor  Place your hands, palms down, underneath your lower back   Next, with your elbows on the floor, lift your shoulders and chest 2 to 3 inches off the floor  Keep your head in line with your shoulders  Hold this position  Slowly return to the starting position  Straight leg raises:  Lie on your back with one leg straight  Bend the other knee and place your foot flat on the floor  Tighten your abdominal muscles  Keep your leg straight and slowly lift it straight up 6 to 12 inches off the floor  Hold this position  Lower your leg slowly  Do as many repetitions as directed on this side  Repeat with the other leg  Plank:  Lie on your stomach  Bend your elbows and place your forearms flat on the floor  Lift your chest, stomach, and knees off the floor  Make sure your elbows are below your shoulders  Your body should be in a straight line  Do not let your hips or lower back sink to the ground  Squeeze your abdominal muscles together and hold for 15 seconds  To make this exercise harder, hold for 30 seconds or lift 1 leg at a time  Bicycles:  Lie on your back  Bend both knees and bring them toward your chest  Your calves should be parallel to the floor  Place the palms of your hands on the back of your head  Straighten your right leg and keep it lifted 2 inches off the floor  Raise your head and shoulders off the floor and twist towards your left  Keep your head and shoulders lifted  Bend your right knee while you straighten your left leg  Keep your left leg 2 inches off the floor  Twist your head and chest towards the left leg  Continue to straighten 1 leg at a time and twist        When should I call my doctor or physical therapist?   You have sharp or worsening pain during exercise or at rest     You have questions or concerns about your condition, care, or exercise program     CARE AGREEMENT:   You have the right to help plan your care  Learn about your health condition and how it may be treated   Discuss treatment options with your healthcare providers to decide what care you want to receive  You always have the right to refuse treatment  The above information is an  only  It is not intended as medical advice for individual conditions or treatments  Talk to your doctor, nurse or pharmacist before following any medical regimen to see if it is safe and effective for you  © Copyright [employer] 2022 Information is for End User's use only and may not be sold, redistributed or otherwise used for commercial purposes

## 2023-04-27 ENCOUNTER — APPOINTMENT (OUTPATIENT)
Dept: RADIOLOGY | Facility: CLINIC | Age: 43
End: 2023-04-27

## 2023-04-27 ENCOUNTER — OFFICE VISIT (OUTPATIENT)
Dept: PAIN MEDICINE | Facility: CLINIC | Age: 43
End: 2023-04-27

## 2023-04-27 VITALS
HEART RATE: 81 BPM | HEIGHT: 70 IN | BODY MASS INDEX: 29.63 KG/M2 | WEIGHT: 207 LBS | SYSTOLIC BLOOD PRESSURE: 145 MMHG | DIASTOLIC BLOOD PRESSURE: 90 MMHG

## 2023-04-27 DIAGNOSIS — T15.90XA FOREIGN BODY IN EYE, UNSPECIFIED LATERALITY, INITIAL ENCOUNTER: ICD-10-CM

## 2023-04-27 DIAGNOSIS — M54.16 LUMBAR RADICULOPATHY: ICD-10-CM

## 2023-04-27 DIAGNOSIS — M51.36 LUMBAR DEGENERATIVE DISC DISEASE: ICD-10-CM

## 2023-04-27 DIAGNOSIS — G89.4 CHRONIC PAIN SYNDROME: Primary | ICD-10-CM

## 2023-04-27 NOTE — PROGRESS NOTES
Assessment:  1  Chronic pain syndrome    2  Lumbar degenerative disc disease    3  Lumbar radiculopathy    4  Foreign body in eye, unspecified laterality, initial encounter        Plan:  While the patient was in the office today, I did have a thorough conversation regarding their chronic pain syndrome, medication management, and treatment plan options  Patient is being seen for a follow-up visit  He was initially seen here for consultation on 3/20/2023  Dr Heather Mendoza provided him with home exercises for lumbar core strengthening/stretching  He tells me that he has been doing the exercises at home for 30 minutes at a time 4 days a week  Unfortunately, he denies any significant improvement in his symptoms  As such, we will order an MRI of his lumbar spine to determine if there is intraspinal pathology that would contribute to his current symptoms  We will order x-ray of his orbits since he is a   Patient discontinued Cymbalta due to it causing anger issues  We discussed possibility of starting gabapentin  Patient is already being prescribed Keppra by his neurologist   I printed off some information about gabapentin for him to take home and read over  I advised him to reach out to his neurologist to make sure they are okay with us prescribing gabapentin in addition to the [address] which is being prescribed for seizures  Follow-up in 1 month  History of Present Illness: The patient is a 38 y o  male who presents for a follow up office visit in regards to Hip Pain (Follow-up/), Leg Pain (Follow-up), and Back Pain (Follow-up)  The patient’s current symptoms include complaints of low back and left leg pain, numbness  Current pain level is a 9/10  Quality pain is described as burning, sharp, throbbing, cramping, pressure-like, shooting, numb, pins-and-needles  Current pain medications includes: None      I have personally reviewed and/or updated the patient's past medical history, past surgical history, family history, social history, current medications, allergies, and vital signs today  Review of Systems  Review of Systems   Constitutional: Negative for chills and fever  HENT: Negative for ear pain and sore throat  Eyes: Negative for pain and visual disturbance  Respiratory: Positive for shortness of breath  Negative for cough  Cardiovascular: Positive for chest pain  Negative for palpitations  Gastrointestinal: Negative for abdominal pain and vomiting  Genitourinary: Negative for dysuria and hematuria  Musculoskeletal: Positive for gait problem  Negative for arthralgias and back pain  Decreased ROM, joint stiffness, swelling in left leg   Skin: Negative for color change and rash  Neurological: Positive for dizziness and weakness  Negative for seizures and syncope  All other systems reviewed and are negative  Past Medical History:   Diagnosis Date   • Alcohol abuse    • Asthma    • GERD (gastroesophageal reflux disease)     w/o esophagitis   • History of cardiovascular stress test 06/18/2018    Exercise stress echo - Frequent PVCs, brief runs of NSVT  Echo portion negative for ischemia     • Hx of echocardiogram 06/18/2018    negative for ischemia   • Hypertension    • PVC's (premature ventricular contractions)    • Seizures ([facility] [address] )     Early spring / late winter of 2018 was last and only seizure       Past Surgical History:   Procedure Laterality Date   • CARPAL TUNNEL RELEASE     • CARPAL TUNNEL RELEASE Bilateral    • CYST REMOVAL     • PILONIDAL CYST EXCISION         Family History   Problem Relation Age of Onset   • Stroke Father    • Heart attack Father    • Coronary artery disease Family         Less than 61 yrs of age   • Diabetes Paternal Grandmother    • Seizures Neg Hx        Social History     Occupational History   • Not on file   Tobacco Use   • Smoking status: Every Day     Packs/day: 2 00     Types: Cigarettes   • Smokeless tobacco: Never "  Vaping Use   • Vaping Use: Never used   Substance and Sexual Activity   • Alcohol use: Yes     Alcohol/week: 6 0 standard drinks     Types: 6 Cans of beer per week     Comment: socially   • Drug use: No   • Sexual activity: Yes     Partners: Female         Current Outpatient Medications:   •  amLODIPine (NORVASC) 5 mg tablet, Take 1 tablet (5 mg total) by mouth daily, Disp: 90 tablet, Rfl: 2  •  levETIRAcetam (KEPPRA) 750 mg tablet, Take 1 tablet (750 mg total) by mouth 2 (two) times a day, Disp: 180 tablet, Rfl: 3  •  ketorolac (TORADOL) 10 mg tablet, Take 1 tablet (10 mg total) by mouth every 6 (six) hours as needed for moderate pain for up to 5 days, Disp: 20 tablet, Rfl: 0    Allergies   Allergen Reactions   • Nonoxynol 9 Other (See Comments)     Childhood reaction - pt unsure of reaction       Physical Exam:    /90 (BP Location: Left arm, Patient Position: Sitting, Cuff Size: Large)   Pulse 81   Ht 5' 10\" (1 778 m)   Wt 93 9 kg (207 lb)   BMI 29 70 kg/m²     Constitutional:normal, well developed, well nourished, alert, in no distress and non-toxic and no overt pain behavior    Eyes:anicteric  HEENT:grossly intact  Neck:supple, symmetric, trachea midline and no masses   Pulmonary:even and unlabored  Cardiovascular:No edema or pitting edema present  Skin:Normal without rashes or lesions and well hydrated  Psychiatric:Mood and affect appropriate  Neurologic:Cranial Nerves II-XII grossly intact  Musculoskeletal:normal    Imaging  MRI lumbar spine without contrast    (Results Pending)   XR facial bones 3+ vw    (Results Pending)       Orders Placed This Encounter   Procedures   • MRI lumbar spine without contrast   • XR facial bones 3+ vw       "

## 2023-04-27 NOTE — PATIENT INSTRUCTIONS
Gabapentin (By mouth)   Gabapentin (natalie-a-PEN-tin)  Treats seizures and pain caused by shingles  Brand Name(s): FusePaq Fanatrex, Neurontin   There may be other brand names for this medicine  When This Medicine Should Not Be Used: This medicine is not right for everyone  Do not use it if you had an allergic reaction to gabapentin  How to Use This Medicine:   Capsule, Liquid, Tablet  Take your medicine as directed  Your dose may need to be changed several times to find what works best for you  If you have epilepsy, do not allow more than 12 hours to pass between doses  Capsule: Swallow the capsule whole with plenty of water  Do not open, crush, or chew it  Gralise® tablet: Swallow the tablet whole   Do not crush, break, or chew it  Neurontin® tablet: If you break a tablet into 2 pieces, use the second half as your next dose  Do not use the half-tablet if the whole tablet has been cut or broken after 28 days  Oral liquid: Measure the oral liquid medicine with a marked measuring spoon, oral syringe, or medicine cup  This medicine should come with a Medication Guide  Ask your pharmacist for a copy if you do not have one  Missed dose: Take a dose as soon as you remember  If it is almost time for your next dose, wait until then and take a regular dose  Do not take extra medicine to make up for a missed dose  Store the medicine in a closed container at room temperature, away from heat, moisture, and direct light  Store the Neurontin® oral liquid in the refrigerator  Do not freeze  Drugs and Foods to Avoid:   Ask your doctor or pharmacist before using any other medicine, including over-the-counter medicines, vitamins, and herbal products  Some medicines can affect how gabapentin works  Tell your doctor if you also using hydrocodone or morphine  If you take an antacid, wait at least 2 hours before you take gabapentin  Do not drink alcohol while you are using this medicine    Tell your doctor if you use anything else that makes you sleepy  Some examples are allergy medicine, narcotic pain medicine, and alcohol  Tell your doctor if you are also using lorazepam, oxycodone, or zolpidem  Warnings While Using This Medicine:   Tell your doctor if you are pregnant or breastfeeding, or if you have kidney problems (including patients receiving dialysis) or lung problems  Tell your doctor if you have a history of depression or mental health problems  This medicine may cause the following problems:  Drug reaction with eosinophilia and systemic symptoms (DRESS) or multiorgan hypersensitivity, which may damage the liver, kidney, blood, heart, or muscles  Changes in mood or behavior, including suicidal thoughts or behavior  Respiratory depression (serious breathing problem that can be life-threatening), when used with narcotic pain medicines  Do not stop using this medicine suddenly  Your doctor will need to slowly decrease your dose before you stop it completely  This medicine may make you dizzy or drowsy  Do not drive or do anything else that could be dangerous until you know how this medicine affects you  Tell any doctor or dentist who treats you that you are using this medicine  This medicine may affect certain medical test results  Your doctor will check your progress and the effects of this medicine at regular visits  Keep all appointments  Keep all medicine out of the reach of children  Never share your medicine with anyone  Possible Side Effects While Using This Medicine:   Call your doctor right away if you notice any of these side effects:   Allergic reaction: Itching or hives, swelling in your face or hands, swelling or tingling in your mouth or throat, chest tightness, trouble breathing  Behavior problems, aggression, restlessness, trouble concentrating, moodiness (especially in children)  Blistering, peeling, red skin rash  Blue lips, fingernails, or skin, chest pain, fast heartbeat, trouble breathing  Change in how much or how often you urinate, bloody or cloudy urine  Dark urine or pale stools, nausea, vomiting, loss of appetite, stomach pain, yellow skin or eyes  Fever, chills, cough, sore throat, body aches  Problems with coordination, shakiness, unsteadiness, unusual eye movement  Rapid weight gain, swelling in your hands, ankles, or feet  Rash, swollen or tender glands in the neck, armpit, or groin  Unusual moods or behaviors, thoughts of hurting yourself, feeling depressed  If you notice these less serious side effects, talk with your doctor:   Dizziness, drowsiness, sleepiness, tiredness  If you notice other side effects that you think are caused by this medicine, tell your doctor  Call your doctor for medical advice about side effects  You may report side effects to FDA at 1-724-FDA-4571  © Copyright Анна Stewart 2022 Information is for End User's use only and may not be sold, redistributed or otherwise used for commercial purposes  The above information is an  only  It is not intended as medical advice for individual conditions or treatments  Talk to your doctor, nurse or pharmacist before following any medical regimen to see if it is safe and effective for you

## 2023-05-10 ENCOUNTER — TELEPHONE (OUTPATIENT)
Dept: PAIN MEDICINE | Facility: CLINIC | Age: 43
End: 2023-05-10

## 2023-05-10 ENCOUNTER — HOSPITAL ENCOUNTER (OUTPATIENT)
Dept: MRI IMAGING | Facility: HOSPITAL | Age: 43
Discharge: HOME | End: 2023-05-10

## 2023-05-10 DIAGNOSIS — M51.36 LUMBAR DEGENERATIVE DISC DISEASE: ICD-10-CM

## 2023-05-10 DIAGNOSIS — G89.4 CHRONIC PAIN SYNDROME: ICD-10-CM

## 2023-05-10 DIAGNOSIS — M54.16 LUMBAR RADICULOPATHY: ICD-10-CM

## 2023-05-10 DIAGNOSIS — M51.26 LUMBAR DISC HERNIATION: ICD-10-CM

## 2023-05-10 DIAGNOSIS — M54.16 LUMBAR RADICULOPATHY: Primary | ICD-10-CM

## 2023-05-10 NOTE — TELEPHONE ENCOUNTER
Neurosurgical referral placed  Please provide him with the following phone number so that he can call and schedule ASAP: [phone]

## 2023-05-10 NOTE — TELEPHONE ENCOUNTER
----- Message from Rosalinda Pelaez, [address] sent at 5/10/2023 11:09 AM EDT -----  Please call patient and let him know that the recent MRI of his lumbar spine reveals a moderate to large left-sided disc protrusion at L4-5 likely impinging the descending left L5 nerve root  I think it would be a good idea for him to be evaluated neurosurgically    Let me know if he is willing and I will place a referral

## 2023-05-10 NOTE — TELEPHONE ENCOUNTER
S/W pt  Advised pt of the same  Pt verbalized understanding  Pt agrees with recommendation by BK to be evaluated by neurosurgery  Pt would like neurosurgery referral placed         Please advise-

## 2023-05-30 ENCOUNTER — CONSULT (OUTPATIENT)
Dept: NEUROSURGERY | Facility: CLINIC | Age: 43
End: 2023-05-30

## 2023-05-30 VITALS
HEIGHT: 70 IN | WEIGHT: 207 LBS | TEMPERATURE: 98.4 F | SYSTOLIC BLOOD PRESSURE: 130 MMHG | HEART RATE: 87 BPM | OXYGEN SATURATION: 97 % | DIASTOLIC BLOOD PRESSURE: 82 MMHG | BODY MASS INDEX: 29.63 KG/M2

## 2023-05-30 DIAGNOSIS — M54.16 LUMBAR RADICULOPATHY: ICD-10-CM

## 2023-05-30 DIAGNOSIS — M51.26 LUMBAR DISC HERNIATION: ICD-10-CM

## 2023-05-30 DIAGNOSIS — G89.4 CHRONIC PAIN SYNDROME: ICD-10-CM

## 2023-05-30 DIAGNOSIS — M51.36 LUMBAR DEGENERATIVE DISC DISEASE: ICD-10-CM

## 2023-05-30 RX ORDER — CEFAZOLIN SODIUM 2 G/50ML
2000 SOLUTION INTRAVENOUS ONCE
OUTPATIENT
Start: 2023-05-30 | End: 2023-05-30

## 2023-05-30 RX ORDER — CHLORHEXIDINE GLUCONATE 0.12 MG/ML
15 RINSE ORAL ONCE
OUTPATIENT
Start: 2023-05-30 | End: 2023-05-30

## 2023-05-30 NOTE — PROGRESS NOTES
Neurosurgery   Mignon Boast 37 y o  male MRN: [id number]      Assessment/Plan   Left L4/5 HNP with left L5 radiculopathy  No durable benefit from conservative measures for 5+ months  Good candidate for left L4/5 hemilaminotomy, foraminotomy, diskectomy  I discussed the nature of the procedure and reasonable expectations with the patient and his wife on the phone during the visit  The risks benefits and alternatives were explained to the patient, including but not limited to: general anesthesia, bleeding, infection, stroke, coma, death, CSF leak, nerve damage, brain damage, spinal cord damage, failure to improve, neurologic deficit including paralysis, need for reoperation  All questions were answered  No guarantees were given  Chief Complaint:  Back and left leg pain    HPI    Left leg pain into foot, web space of great toe  No incontinence  Valsalva used to lead to radiating pain down the leg, but this has improved some  Worse with sitting, especially on his couch  Difficulty walking  Steroids with temporary benefit      Lymphedema in the left leg in the past, recurred lower now    LENKA OG personally reviewed and updated  Review of Systems    Vitals: There were no vitals taken for this visit  Past Medical History:   Diagnosis Date   • Alcohol abuse    • Asthma    • GERD (gastroesophageal reflux disease)     w/o esophagitis   • History of cardiovascular stress test 06/18/2018    Exercise stress echo - Frequent PVCs, brief runs of NSVT  Echo portion negative for ischemia     • Hx of echocardiogram 06/18/2018    negative for ischemia   • Hypertension    • PVC's (premature ventricular contractions)    • Seizures ([facility] [address] )     Early spring / late winter of 2018 was last and only seizure       Past Surgical History:   Procedure Laterality Date   • CARPAL TUNNEL RELEASE     • CARPAL TUNNEL RELEASE Bilateral    • CYST REMOVAL     • PILONIDAL CYST EXCISION           Current Outpatient Medications:   • amLODIPine (NORVASC) 5 mg tablet, Take 1 tablet (5 mg total) by mouth daily, Disp: 90 tablet, Rfl: 2  •  ketorolac (TORADOL) 10 mg tablet, Take 1 tablet (10 mg total) by mouth every 6 (six) hours as needed for moderate pain for up to 5 days, Disp: 20 tablet, Rfl: 0  •  levETIRAcetam (KEPPRA) 750 mg tablet, Take 1 tablet (750 mg total) by mouth 2 (two) times a day, Disp: 180 tablet, Rfl: 3      Allergies   Allergen Reactions   • Nonoxynol 9 Other (See Comments)     Childhood reaction - pt unsure of reaction       Social History     Socioeconomic History   • Marital status: /Civil Union     Spouse name: Not on file   • Number of children: Not on file   • Years of education: Not on file   • Highest education level: Not on file   Occupational History   • Not on file   Tobacco Use   • Smoking status: Every Day     Packs/day: 2 00     Types: Cigarettes   • Smokeless tobacco: Never   Vaping Use   • Vaping Use: Never used   Substance and Sexual Activity   • Alcohol use: Yes     Alcohol/week: 6 0 standard drinks of alcohol     Types: 6 Cans of beer per week     Comment: socially   • Drug use: No   • Sexual activity: Yes     Partners: Female   Other Topics Concern   • Not on file   Social History Narrative    ** Merged History Encounter **          Social Determinants of Health     Financial Resource Strain: Not on file   Food Insecurity: Not on file   Transportation Needs: Not on file   Physical Activity: Not on file   Stress: Not on file   Social Connections: Not on file   Intimate Partner Violence: Not on file   Housing Stability: Not on file            Imaging:  MRI Images and Report of the Lumbar show HNP left L4/5 with Left L5 radiculopathy      Physical Exam    Well-developed well-nourished  Appears stated age of 37 y o    Awake alert oriented x3  Verbally interactive and appropriate  Cranial nerves II through VIII intact   Motor strength 5 out of 5 except left foot dorsiflexion 4/5  No pronator drift  Sensory intact to light touch except Left leg into web space of great toe decreased  No sensory changes on the dorsum of the spine    without pain to palpation over the Cervical, Thoracic and Lumbar   Right SI joint discomfort to palplation  Reflexes 1+and symmetric  Toes: downgoing  Gait: limping on the left  Heel walk: limping on the left  Toe walk[de-identified] limping on the left  Tandem walk: not assessed  Straight leg raising: with pain radiating down the left leg at < 60 degrees  without right sided hip pain with hip rotation  without left sided hip pain with hip rotation  Memory: intact  Mood: Euthymic affect: Full range  Language: Fluent in [place]  Good peripheral pulses in bilateral lower  extremities

## 2023-05-30 NOTE — PROGRESS NOTES
Review of Systems   Constitutional: Negative  HENT: Negative  Eyes: Negative  Respiratory: Negative  Cardiovascular: Negative  H/o HTN   Gastrointestinal: Negative  Endocrine: Negative  Genitourinary: Negative  Musculoskeletal: Positive for back pain, gait problem and myalgias (left leg cramping)  Lbp radiates to left leg x 1 yr  + N/T/W on left leg  His pain is constant, burning, shooting pain and he rates it a 9/10 today  + joint stiffness and swelling of left leg and unsteady when walking  Meds: Trial Cymbalta but it caused anger issues  H/o Lymphodema on left leg   Pain Man- 3/30/23, PT eval on 1/5/23 x 1 visit   L/S MRI done 5/10/23  Smoker 2 pks/day- No AC   Skin: Negative  Allergic/Immunologic: Negative  Neurological: Positive for seizures ( H/o Seizure on Keppra, ), weakness and numbness  Hematological: Negative  Psychiatric/Behavioral: Positive for sleep disturbance (due to pain)  All other systems reviewed and are negative

## 2023-06-05 ENCOUNTER — APPOINTMENT (OUTPATIENT)
Dept: LAB | Facility: HOSPITAL | Age: 43
End: 2023-06-05
Attending: NEUROLOGICAL SURGERY
Payer: COMMERCIAL

## 2023-06-05 ENCOUNTER — APPOINTMENT (OUTPATIENT)
Dept: LAB | Facility: HOSPITAL | Age: 43
End: 2023-06-05
Payer: COMMERCIAL

## 2023-06-05 DIAGNOSIS — Z01.818 PRE-PROCEDURAL EXAMINATION: ICD-10-CM

## 2023-06-05 DIAGNOSIS — M54.16 LUMBAR RADICULOPATHY: ICD-10-CM

## 2023-06-05 LAB
ALBUMIN SERPL BCP-MCNC: 4.4 G/DL (ref 3.5–5)
ALP SERPL-CCNC: 69 U/L (ref 34–104)
ALT SERPL W P-5'-P-CCNC: 17 U/L (ref 7–52)
ANION GAP SERPL CALCULATED.3IONS-SCNC: 8 MMOL/L (ref 4–13)
APTT PPP: 26 SECONDS (ref 23–37)
AST SERPL W P-5'-P-CCNC: 19 U/L (ref 13–39)
BASOPHILS # BLD AUTO: 0.13 THOUSANDS/ÂΜL (ref 0–0.1)
BASOPHILS NFR BLD AUTO: 1 % (ref 0–1)
BILIRUB SERPL-MCNC: 0.61 MG/DL (ref 0.2–1)
BUN SERPL-MCNC: 7 MG/DL (ref 5–25)
CALCIUM SERPL-MCNC: 9.6 MG/DL (ref 8.4–10.2)
CHLORIDE SERPL-SCNC: 104 MMOL/L (ref 96–108)
CO2 SERPL-SCNC: 26 MMOL/L (ref 21–32)
CREAT SERPL-MCNC: 0.84 MG/DL (ref 0.6–1.3)
EOSINOPHIL # BLD AUTO: 0.2 THOUSAND/ÂΜL (ref 0–0.61)
EOSINOPHIL NFR BLD AUTO: 2 % (ref 0–6)
ERYTHROCYTE [DISTWIDTH] IN BLOOD BY AUTOMATED COUNT: 12.2 % (ref 11.6–15.1)
EST. AVERAGE GLUCOSE BLD GHB EST-MCNC: 100 MG/DL
GFR SERPL CREATININE-BSD FRML MDRD: 105 ML/MIN/1.73SQ M
GLUCOSE SERPL-MCNC: 92 MG/DL (ref 65–140)
HBA1C MFR BLD: 5.1 %
HCT VFR BLD AUTO: 48.7 % (ref 36.5–49.3)
HGB BLD-MCNC: 16.8 G/DL (ref 12–17)
IMM GRANULOCYTES # BLD AUTO: 0.03 THOUSAND/UL (ref 0–0.2)
IMM GRANULOCYTES NFR BLD AUTO: 0 % (ref 0–2)
INR PPP: 0.93 (ref 0.84–1.19)
LYMPHOCYTES # BLD AUTO: 1.79 THOUSANDS/ÂΜL (ref 0.6–4.47)
LYMPHOCYTES NFR BLD AUTO: 20 % (ref 14–44)
MCH RBC QN AUTO: 32.2 PG (ref 26.8–34.3)
MCHC RBC AUTO-ENTMCNC: 34.5 G/DL (ref 31.4–37.4)
MCV RBC AUTO: 94 FL (ref 82–98)
MONOCYTES # BLD AUTO: 0.5 THOUSAND/ÂΜL (ref 0.17–1.22)
MONOCYTES NFR BLD AUTO: 6 % (ref 4–12)
NEUTROPHILS # BLD AUTO: 6.37 THOUSANDS/ÂΜL (ref 1.85–7.62)
NEUTS SEG NFR BLD AUTO: 71 % (ref 43–75)
NRBC BLD AUTO-RTO: 0 /100 WBCS
PLATELET # BLD AUTO: 302 THOUSANDS/UL (ref 149–390)
PMV BLD AUTO: 9.4 FL (ref 8.9–12.7)
POTASSIUM SERPL-SCNC: 4 MMOL/L (ref 3.5–5.3)
PROT SERPL-MCNC: 7.6 G/DL (ref 6.4–8.4)
PROTHROMBIN TIME: 12.6 SECONDS (ref 11.6–14.5)
RBC # BLD AUTO: 5.21 MILLION/UL (ref 3.88–5.62)
SODIUM SERPL-SCNC: 138 MMOL/L (ref 135–147)
WBC # BLD AUTO: 9.02 THOUSAND/UL (ref 4.31–10.16)

## 2023-06-05 PROCEDURE — 80053 COMPREHEN METABOLIC PANEL: CPT

## 2023-06-05 PROCEDURE — 85730 THROMBOPLASTIN TIME PARTIAL: CPT

## 2023-06-05 PROCEDURE — 85025 COMPLETE CBC W/AUTO DIFF WBC: CPT

## 2023-06-05 PROCEDURE — 83036 HEMOGLOBIN GLYCOSYLATED A1C: CPT

## 2023-06-05 PROCEDURE — 36415 COLL VENOUS BLD VENIPUNCTURE: CPT

## 2023-06-05 PROCEDURE — 93005 ELECTROCARDIOGRAM TRACING: CPT

## 2023-06-05 PROCEDURE — 85610 PROTHROMBIN TIME: CPT

## 2023-06-06 LAB
ATRIAL RATE: 66 BPM
P AXIS: 27 DEGREES
PR INTERVAL: 162 MS
QRS AXIS: 33 DEGREES
QRSD INTERVAL: 108 MS
QT INTERVAL: 426 MS
QTC INTERVAL: 446 MS
T WAVE AXIS: 23 DEGREES
VENTRICULAR RATE: 66 BPM

## 2023-06-06 PROCEDURE — 93010 ELECTROCARDIOGRAM REPORT: CPT | Performed by: INTERNAL MEDICINE

## 2023-06-13 ENCOUNTER — CONSULT (OUTPATIENT)
Dept: FAMILY MEDICINE CLINIC | Facility: CLINIC | Age: 43
End: 2023-06-13
Payer: COMMERCIAL

## 2023-06-13 VITALS
TEMPERATURE: 97 F | HEART RATE: 80 BPM | HEIGHT: 70 IN | RESPIRATION RATE: 18 BRPM | DIASTOLIC BLOOD PRESSURE: 82 MMHG | OXYGEN SATURATION: 97 % | WEIGHT: 202 LBS | SYSTOLIC BLOOD PRESSURE: 123 MMHG | BODY MASS INDEX: 28.92 KG/M2

## 2023-06-13 DIAGNOSIS — M54.16 LUMBAR RADICULOPATHY: ICD-10-CM

## 2023-06-13 DIAGNOSIS — Z01.818 PRE-OP EXAMINATION: Primary | ICD-10-CM

## 2023-06-13 DIAGNOSIS — M51.36 LUMBAR DEGENERATIVE DISC DISEASE: ICD-10-CM

## 2023-06-13 PROCEDURE — 99213 OFFICE O/P EST LOW 20 MIN: CPT | Performed by: FAMILY MEDICINE

## 2023-06-13 NOTE — H&P (VIEW-ONLY)
Assessment/Plan:     Problem List Items Addressed This Visit        Nervous and Auditory    Lumbar radiculopathy       Musculoskeletal and Integument    Lumbar degenerative disc disease   Other Visit Diagnoses     Pre-op examination    -  Primary            Patient is  hemodynamically stable, clinically well-appearing and in no acute distress  Patient is at appropriate risk for surgery for L4/5 hemilaminotomy, foraminotomy and diskectomy  Vital signs unremarkable  Patient follow-up with neurosurgery as well as with us after surgical procedure  Subjective:      Patient ID: Nadiya Monahan is a 37 y o  male with a past medical history of essential hypertension, seizure history and lumbar radiculopathy presents to the office for preop clearance  Patient is a candidate for L4/5 hemilaminotomy, foraminotomy and diskectomy  Patient currently being seen by Dr Ashley Knight, neurosurgery  Patient has appointment for surgical procedure on June 22, 2023  Patient has no acute concerns today other than chronic issue that is being addressed by neurosurgery  Patient specifically has back pain, leg pain and numbness in the left leg region  Recent MRI indicated moderate to large left-sided disc protrusion at L4-L5 likely impinging the descending left L3 nerve root  Patient had no complications with anesthesia in the past     Patient is a current everyday smoker  Patient drinks about a pack to 2 packs a day  Patient denies current alcohol use  Denies recreational drugs  Work along with EKG completed  Unremarkable overall  HPI    The following portions of the patient's history were reviewed and updated as appropriate: allergies, current medications, past family history, past medical history, past social history, past surgical history and problem list     Review of Systems   Constitutional: Negative for chills and fever  HENT: Negative for ear pain and sore throat      Eyes: Negative for pain and visual "disturbance  Respiratory: Negative for cough and shortness of breath  Cardiovascular: Negative for chest pain and palpitations  Gastrointestinal: Negative for abdominal pain and vomiting  Genitourinary: Negative for dysuria and hematuria  Musculoskeletal: Positive for back pain and gait problem  Negative for arthralgias  Skin: Negative for color change and rash  Neurological: Negative for seizures and syncope  All other systems reviewed and are negative  Objective:    /82   Pulse 80   Temp (!) 97 °F (36 1 °C)   Resp 18   Ht 5' 10\" (1 778 m)   Wt 91 6 kg (202 lb)   SpO2 97%   BMI 28 98 kg/m²        Physical Exam  Constitutional:       Appearance: Normal appearance  He is obese  HENT:      Head: Normocephalic and atraumatic  Cardiovascular:      Rate and Rhythm: Normal rate and regular rhythm  Pulses: Normal pulses  Heart sounds: Normal heart sounds  Pulmonary:      Effort: Pulmonary effort is normal       Breath sounds: Normal breath sounds  Abdominal:      General: Abdomen is flat  Palpations: Abdomen is soft  Musculoskeletal:         General: Tenderness present  Normal range of motion  Cervical back: Normal range of motion and neck supple  Comments: Gait is compromised  Patient with history of lumbar radiculopathy of L4/5   Skin:     General: Skin is warm  Capillary Refill: Capillary refill takes less than 2 seconds  Neurological:      General: No focal deficit present  Mental Status: He is alert and oriented to person, place, and time  Cranial Nerves: No cranial nerve deficit  Sensory: No sensory deficit  Motor: No weakness        Coordination: Coordination normal    Psychiatric:         Mood and Affect: Mood normal          Behavior: Behavior normal          "

## 2023-06-13 NOTE — PRE-PROCEDURE INSTRUCTIONS
Pre-Surgery Instructions:   Medication Instructions   • amLODIPine (NORVASC) 5 mg tablet Take day of surgery  • levETIRAcetam (KEPPRA) 750 mg tablet Take day of surgery  Medication instructions for day surgery reviewed  Please use only a sip of water to take your instructed medications  Avoid all over the counter vitamins, supplements and NSAIDS for one week prior to surgery per anesthesia guidelines  Tylenol is ok to take as needed  You will receive a call one business day prior to surgery with an arrival time and hospital directions  If your surgery is scheduled on a Monday, the hospital will be calling you on the Friday prior to your surgery  If you have not heard from anyone by 8pm, please call the hospital supervisor through the hospital  at [phone]  Magui Alejandra [phone])  Do not eat or drink anything after midnight the night before your surgery, including candy, mints, lifesavers, or chewing gum  Do not drink alcohol 24hrs before your surgery  Try not to smoke at least 24hrs before your surgery  Follow the pre surgery showering instructions as listed in the [facility] Surgical Experience Booklet” or otherwise provided by your surgeon's office  Do not shave the surgical area 24 hours before surgery  Do not apply any lotions, creams, including makeup, cologne, deodorant, or perfumes after showering on the day of your surgery  No contact lenses, eye make-up, or artificial eyelashes  Remove nail polish, including gel polish, and any artificial, gel, or acrylic nails if possible  Remove all jewelry including rings and body piercing jewelry  Wear causal clothing that is easy to take on and off  Consider your type of surgery  Keep any valuables, jewelry, piercings at home  Please bring any specially ordered equipment (sling, braces) if indicated  Arrange for a responsible person to drive you to and from the hospital on the day of your surgery  Visitor Guidelines discussed  Call the surgeon's office with any new illnesses, exposures, or additional questions prior to surgery  Please reference your [facility] Surgical Experience Booklet” for additional information to prepare for your upcoming surgery

## 2023-06-13 NOTE — PROGRESS NOTES
Assessment/Plan:     Problem List Items Addressed This Visit        Nervous and Auditory    Lumbar radiculopathy       Musculoskeletal and Integument    Lumbar degenerative disc disease   Other Visit Diagnoses     Pre-op examination    -  Primary            Patient is  hemodynamically stable, clinically well-appearing and in no acute distress  Patient is at appropriate risk for surgery for L4/5 hemilaminotomy, foraminotomy and diskectomy  Vital signs unremarkable  Patient follow-up with neurosurgery as well as with us after surgical procedure  Subjective:      Patient ID: Chaitanya Ricardo is a 37 y o  male with a past medical history of essential hypertension, seizure history and lumbar radiculopathy presents to the office for preop clearance  Patient is a candidate for L4/5 hemilaminotomy, foraminotomy and diskectomy  Patient currently being seen by Dr Juli Mccurdy, neurosurgery  Patient has appointment for surgical procedure on June 22, 2023  Patient has no acute concerns today other than chronic issue that is being addressed by neurosurgery  Patient specifically has back pain, leg pain and numbness in the left leg region  Recent MRI indicated moderate to large left-sided disc protrusion at L4-L5 likely impinging the descending left L3 nerve root  Patient had no complications with anesthesia in the past     Patient is a current everyday smoker  Patient drinks about a pack to 2 packs a day  Patient denies current alcohol use  Denies recreational drugs  Work along with EKG completed  Unremarkable overall  HPI    The following portions of the patient's history were reviewed and updated as appropriate: allergies, current medications, past family history, past medical history, past social history, past surgical history and problem list     Review of Systems   Constitutional: Negative for chills and fever  HENT: Negative for ear pain and sore throat      Eyes: Negative for pain and visual "disturbance  Respiratory: Negative for cough and shortness of breath  Cardiovascular: Negative for chest pain and palpitations  Gastrointestinal: Negative for abdominal pain and vomiting  Genitourinary: Negative for dysuria and hematuria  Musculoskeletal: Positive for back pain and gait problem  Negative for arthralgias  Skin: Negative for color change and rash  Neurological: Negative for seizures and syncope  All other systems reviewed and are negative  Objective:    /82   Pulse 80   Temp (!) 97 °F (36 1 °C)   Resp 18   Ht 5' 10\" (1 778 m)   Wt 91 6 kg (202 lb)   SpO2 97%   BMI 28 98 kg/m²        Physical Exam  Constitutional:       Appearance: Normal appearance  He is obese  HENT:      Head: Normocephalic and atraumatic  Cardiovascular:      Rate and Rhythm: Normal rate and regular rhythm  Pulses: Normal pulses  Heart sounds: Normal heart sounds  Pulmonary:      Effort: Pulmonary effort is normal       Breath sounds: Normal breath sounds  Abdominal:      General: Abdomen is flat  Palpations: Abdomen is soft  Musculoskeletal:         General: Tenderness present  Normal range of motion  Cervical back: Normal range of motion and neck supple  Comments: Gait is compromised  Patient with history of lumbar radiculopathy of L4/5   Skin:     General: Skin is warm  Capillary Refill: Capillary refill takes less than 2 seconds  Neurological:      General: No focal deficit present  Mental Status: He is alert and oriented to person, place, and time  Cranial Nerves: No cranial nerve deficit  Sensory: No sensory deficit  Motor: No weakness        Coordination: Coordination normal    Psychiatric:         Mood and Affect: Mood normal          Behavior: Behavior normal          "

## 2023-06-21 ENCOUNTER — ANESTHESIA EVENT (OUTPATIENT)
Dept: PERIOP | Facility: HOSPITAL | Age: 43
End: 2023-06-21
Payer: COMMERCIAL

## 2023-06-22 ENCOUNTER — HOSPITAL ENCOUNTER (OUTPATIENT)
Dept: RADIOLOGY | Facility: HOSPITAL | Age: 43
Setting detail: OUTPATIENT SURGERY
Discharge: HOME | End: 2023-06-22
Payer: COMMERCIAL

## 2023-06-22 ENCOUNTER — HOSPITAL ENCOUNTER (OUTPATIENT)
Facility: HOSPITAL | Age: 43
Setting detail: OUTPATIENT SURGERY
Discharge: HOME | End: 2023-06-23
Attending: NEUROLOGICAL SURGERY | Admitting: NEUROLOGICAL SURGERY
Payer: COMMERCIAL

## 2023-06-22 ENCOUNTER — ANESTHESIA (OUTPATIENT)
Dept: PERIOP | Facility: HOSPITAL | Age: 43
End: 2023-06-22
Payer: COMMERCIAL

## 2023-06-22 VITALS
RESPIRATION RATE: 17 BRPM | SYSTOLIC BLOOD PRESSURE: 141 MMHG | DIASTOLIC BLOOD PRESSURE: 89 MMHG | HEART RATE: 62 BPM | TEMPERATURE: 97 F | WEIGHT: 202 LBS | HEIGHT: 70 IN | BODY MASS INDEX: 28.92 KG/M2 | OXYGEN SATURATION: 94 %

## 2023-06-22 DIAGNOSIS — G89.4 CHRONIC PAIN SYNDROME: Primary | ICD-10-CM

## 2023-06-22 DIAGNOSIS — M54.16 LUMBAR RADICULOPATHY: ICD-10-CM

## 2023-06-22 DIAGNOSIS — Z98.890 POST-OPERATIVE STATE: ICD-10-CM

## 2023-06-22 DIAGNOSIS — M51.36 LUMBAR DEGENERATIVE DISC DISEASE: ICD-10-CM

## 2023-06-22 PROBLEM — IMO0001 SMOKING: Status: ACTIVE | Noted: 2023-06-22

## 2023-06-22 PROBLEM — F17.200 SMOKING: Status: ACTIVE | Noted: 2023-06-22

## 2023-06-22 LAB — GLUCOSE SERPL-MCNC: 99 MG/DL (ref 65–140)

## 2023-06-22 PROCEDURE — 82948 REAGENT STRIP/BLOOD GLUCOSE: CPT

## 2023-06-22 PROCEDURE — 72020 X-RAY EXAM OF SPINE 1 VIEW: CPT

## 2023-06-22 RX ORDER — HYDRALAZINE HYDROCHLORIDE 20 MG/ML
5 INJECTION INTRAMUSCULAR; INTRAVENOUS
Status: DISCONTINUED | OUTPATIENT
Start: 2023-06-22 | End: 2023-06-22 | Stop reason: HOSPADM

## 2023-06-22 RX ORDER — PROMETHAZINE HYDROCHLORIDE 25 MG/ML
12.5 INJECTION, SOLUTION INTRAMUSCULAR; INTRAVENOUS ONCE AS NEEDED
Status: DISCONTINUED | OUTPATIENT
Start: 2023-06-22 | End: 2023-06-22 | Stop reason: HOSPADM

## 2023-06-22 RX ORDER — SODIUM CHLORIDE, SODIUM LACTATE, POTASSIUM CHLORIDE, CALCIUM CHLORIDE 600; 310; 30; 20 MG/100ML; MG/100ML; MG/100ML; MG/100ML
125 INJECTION, SOLUTION INTRAVENOUS CONTINUOUS
Status: DISCONTINUED | OUTPATIENT
Start: 2023-06-22 | End: 2023-06-23 | Stop reason: HOSPADM

## 2023-06-22 RX ORDER — METOCLOPRAMIDE HYDROCHLORIDE 5 MG/ML
10 INJECTION INTRAMUSCULAR; INTRAVENOUS ONCE AS NEEDED
Status: DISCONTINUED | OUTPATIENT
Start: 2023-06-22 | End: 2023-06-22 | Stop reason: HOSPADM

## 2023-06-22 RX ORDER — HYDROMORPHONE HCL/PF 1 MG/ML
0.5 SYRINGE (ML) INJECTION
Status: DISCONTINUED | OUTPATIENT
Start: 2023-06-22 | End: 2023-06-22 | Stop reason: HOSPADM

## 2023-06-22 RX ORDER — METHOCARBAMOL 750 MG/1
750 TABLET, FILM COATED ORAL EVERY 6 HOURS SCHEDULED
Qty: 28 TABLET | Refills: 0 | Status: SHIPPED | OUTPATIENT
Start: 2023-06-22 | End: 2023-06-29

## 2023-06-22 RX ORDER — OXYCODONE HYDROCHLORIDE 5 MG/1
5 TABLET ORAL EVERY 4 HOURS PRN
Qty: 30 TABLET | Refills: 0 | Status: SHIPPED | OUTPATIENT
Start: 2023-06-22 | End: 2023-06-27

## 2023-06-22 RX ORDER — DEXAMETHASONE SODIUM PHOSPHATE 10 MG/ML
INJECTION, SOLUTION INTRAMUSCULAR; INTRAVENOUS AS NEEDED
Status: DISCONTINUED | OUTPATIENT
Start: 2023-06-22 | End: 2023-06-22

## 2023-06-22 RX ORDER — CHLORHEXIDINE GLUCONATE 0.12 MG/ML
15 RINSE ORAL ONCE
Status: COMPLETED | OUTPATIENT
Start: 2023-06-22 | End: 2023-06-22

## 2023-06-22 RX ORDER — ONDANSETRON 2 MG/ML
INJECTION INTRAMUSCULAR; INTRAVENOUS AS NEEDED
Status: DISCONTINUED | OUTPATIENT
Start: 2023-06-22 | End: 2023-06-22

## 2023-06-22 RX ORDER — ALBUTEROL SULFATE 2.5 MG/3ML
2.5 SOLUTION RESPIRATORY (INHALATION) ONCE AS NEEDED
Status: DISCONTINUED | OUTPATIENT
Start: 2023-06-22 | End: 2023-06-22 | Stop reason: HOSPADM

## 2023-06-22 RX ORDER — FENTANYL CITRATE/PF 50 MCG/ML
25 SYRINGE (ML) INJECTION
Status: DISCONTINUED | OUTPATIENT
Start: 2023-06-22 | End: 2023-06-22 | Stop reason: HOSPADM

## 2023-06-22 RX ORDER — LIDOCAINE HYDROCHLORIDE 20 MG/ML
INJECTION, SOLUTION EPIDURAL; INFILTRATION; INTRACAUDAL; PERINEURAL AS NEEDED
Status: DISCONTINUED | OUTPATIENT
Start: 2023-06-22 | End: 2023-06-22

## 2023-06-22 RX ORDER — ONDANSETRON 2 MG/ML
4 INJECTION INTRAMUSCULAR; INTRAVENOUS ONCE AS NEEDED
Status: DISCONTINUED | OUTPATIENT
Start: 2023-06-22 | End: 2023-06-22 | Stop reason: HOSPADM

## 2023-06-22 RX ORDER — FENTANYL CITRATE 50 UG/ML
INJECTION, SOLUTION INTRAMUSCULAR; INTRAVENOUS AS NEEDED
Status: DISCONTINUED | OUTPATIENT
Start: 2023-06-22 | End: 2023-06-22

## 2023-06-22 RX ORDER — CEFAZOLIN SODIUM 2 G/50ML
2000 SOLUTION INTRAVENOUS ONCE
Status: COMPLETED | OUTPATIENT
Start: 2023-06-22 | End: 2023-06-22

## 2023-06-22 RX ORDER — CHLORHEXIDINE GLUCONATE 0.12 MG/ML
RINSE ORAL
Status: COMPLETED
Start: 2023-06-22 | End: 2023-06-22

## 2023-06-22 RX ORDER — HYDROMORPHONE HCL IN WATER/PF 6 MG/30 ML
0.2 PATIENT CONTROLLED ANALGESIA SYRINGE INTRAVENOUS
Status: DISCONTINUED | OUTPATIENT
Start: 2023-06-22 | End: 2023-06-22 | Stop reason: HOSPADM

## 2023-06-22 RX ORDER — LABETALOL HYDROCHLORIDE 5 MG/ML
10 INJECTION, SOLUTION INTRAVENOUS
Status: DISCONTINUED | OUTPATIENT
Start: 2023-06-22 | End: 2023-06-22 | Stop reason: HOSPADM

## 2023-06-22 RX ORDER — ALBUTEROL SULFATE 2.5 MG/3ML
2.5 SOLUTION RESPIRATORY (INHALATION) ONCE
Status: COMPLETED | OUTPATIENT
Start: 2023-06-22 | End: 2023-06-22

## 2023-06-22 RX ORDER — PROPOFOL 10 MG/ML
INJECTION, EMULSION INTRAVENOUS AS NEEDED
Status: DISCONTINUED | OUTPATIENT
Start: 2023-06-22 | End: 2023-06-22

## 2023-06-22 RX ORDER — MIDAZOLAM HYDROCHLORIDE 2 MG/2ML
INJECTION, SOLUTION INTRAMUSCULAR; INTRAVENOUS AS NEEDED
Status: DISCONTINUED | OUTPATIENT
Start: 2023-06-22 | End: 2023-06-22

## 2023-06-22 RX ORDER — ROCURONIUM BROMIDE 10 MG/ML
INJECTION, SOLUTION INTRAVENOUS AS NEEDED
Status: DISCONTINUED | OUTPATIENT
Start: 2023-06-22 | End: 2023-06-22

## 2023-06-22 RX ORDER — MAGNESIUM HYDROXIDE 1200 MG/15ML
LIQUID ORAL AS NEEDED
Status: DISCONTINUED | OUTPATIENT
Start: 2023-06-22 | End: 2023-06-22 | Stop reason: HOSPADM

## 2023-06-22 RX ORDER — BUPIVACAINE HYDROCHLORIDE 2.5 MG/ML
INJECTION, SOLUTION EPIDURAL; INFILTRATION; INTRACAUDAL AS NEEDED
Status: DISCONTINUED | OUTPATIENT
Start: 2023-06-22 | End: 2023-06-22 | Stop reason: HOSPADM

## 2023-06-22 RX ADMIN — SUGAMMADEX 200 MG: 100 INJECTION, SOLUTION INTRAVENOUS at 17:14

## 2023-06-22 RX ADMIN — ROCURONIUM BROMIDE 50 MG: 50 INJECTION INTRAVENOUS at 15:26

## 2023-06-22 RX ADMIN — CHLORHEXIDINE GLUCONATE 15 ML: 1.2 SOLUTION ORAL at 11:44

## 2023-06-22 RX ADMIN — ROCURONIUM BROMIDE 10 MG: 50 INJECTION INTRAVENOUS at 16:20

## 2023-06-22 RX ADMIN — SODIUM CHLORIDE, SODIUM LACTATE, POTASSIUM CHLORIDE, AND CALCIUM CHLORIDE: .6; .31; .03; .02 INJECTION, SOLUTION INTRAVENOUS at 15:23

## 2023-06-22 RX ADMIN — PROPOFOL 250 MG: 10 INJECTION, EMULSION INTRAVENOUS at 15:26

## 2023-06-22 RX ADMIN — MIDAZOLAM 2 MG: 1 INJECTION INTRAMUSCULAR; INTRAVENOUS at 15:26

## 2023-06-22 RX ADMIN — ONDANSETRON 4 MG: 2 INJECTION INTRAMUSCULAR; INTRAVENOUS at 16:53

## 2023-06-22 RX ADMIN — ROCURONIUM BROMIDE 20 MG: 50 INJECTION INTRAVENOUS at 15:56

## 2023-06-22 RX ADMIN — FENTANYL CITRATE 50 MCG: 50 INJECTION, SOLUTION INTRAMUSCULAR; INTRAVENOUS at 16:55

## 2023-06-22 RX ADMIN — DEXAMETHASONE SODIUM PHOSPHATE 10 MG: 10 INJECTION, SOLUTION INTRAMUSCULAR; INTRAVENOUS at 15:26

## 2023-06-22 RX ADMIN — CEFAZOLIN SODIUM 2000 MG: 2 SOLUTION INTRAVENOUS at 15:45

## 2023-06-22 RX ADMIN — CHLORHEXIDINE GLUCONATE 15 ML: 0.12 RINSE ORAL at 11:44

## 2023-06-22 RX ADMIN — FENTANYL CITRATE 100 MCG: 50 INJECTION, SOLUTION INTRAMUSCULAR; INTRAVENOUS at 15:26

## 2023-06-22 RX ADMIN — SODIUM CHLORIDE, SODIUM LACTATE, POTASSIUM CHLORIDE, AND CALCIUM CHLORIDE: .6; .31; .03; .02 INJECTION, SOLUTION INTRAVENOUS at 17:27

## 2023-06-22 RX ADMIN — ALBUTEROL SULFATE 2.5 MG: 2.5 SOLUTION RESPIRATORY (INHALATION) at 12:39

## 2023-06-22 RX ADMIN — FENTANYL CITRATE 100 MCG: 50 INJECTION, SOLUTION INTRAMUSCULAR; INTRAVENOUS at 17:24

## 2023-06-22 RX ADMIN — LIDOCAINE HYDROCHLORIDE 100 MG: 20 INJECTION, SOLUTION EPIDURAL; INFILTRATION; INTRACAUDAL; PERINEURAL at 15:26

## 2023-06-22 RX ADMIN — MIDAZOLAM 2 MG: 1 INJECTION INTRAMUSCULAR; INTRAVENOUS at 15:20

## 2023-06-22 RX ADMIN — SODIUM CHLORIDE, SODIUM LACTATE, POTASSIUM CHLORIDE, AND CALCIUM CHLORIDE 125 ML/HR: .6; .31; .03; .02 INJECTION, SOLUTION INTRAVENOUS at 11:56

## 2023-06-22 NOTE — ANESTHESIA PREPROCEDURE EVALUATION
Procedure:  Lumbar laminectomy with discectomy left L4/5 (Spine Lumbar)    Relevant Problems   ANESTHESIA (within normal limits)      CARDIO   (+) Essential hypertension      GI/HEPATIC   (+) GERD without esophagitis      MUSCULOSKELETAL   (+) Lumbar degenerative disc disease      NEURO/PSYCH   (+) Alcohol withdrawal seizure without complication (HCC)   (+) Chronic pain syndrome   (+) Seizures (HCC)      PULMONARY   (+) Smoking      Nervous and Auditory   (+) Lumbar radiculopathy        Physical Exam    Airway    Mallampati score: III  TM Distance: >3 FB  Neck ROM: full     Dental   Comment: Poor dentition,     Cardiovascular  Rhythm: regular, Rate: normal, Cardiovascular exam normal    Pulmonary  Pulmonary exam normal Breath sounds clear to auscultation,     Other Findings        Anesthesia Plan  ASA Score- 2     Anesthesia Type- general with ASA Monitors  Additional Monitors:   Airway Plan: ETT  Plan Factors-Exercise tolerance (METS): >4 METS  Chart reviewed  Existing labs reviewed  Patient summary reviewed  Patient is a current smoker  Patient instructed to abstain from smoking on day of procedure  Patient smoked on day of surgery  Induction- intravenous  Postoperative Plan- Plan for postoperative opioid use  Planned trial extubation    Informed Consent- Anesthetic plan and risks discussed with patient  I personally reviewed this patient with the CRNA  Discussed and agreed on the Anesthesia Plan with the CRNA  Jomarist Razia

## 2023-06-22 NOTE — INTERVAL H&P NOTE
H&P reviewed  After examining the patient I find no changes in the patients condition since the H&P had been written      Vitals:    06/22/23 1241   BP:    Pulse:    Temp:    SpO2: 98%

## 2023-06-22 NOTE — ANESTHESIA POSTPROCEDURE EVALUATION
Post-Op Assessment Note    CV Status:  Stable    Pain management: adequate     Mental Status:  Alert and awake   Hydration Status:  Euvolemic   PONV Controlled:  Controlled   Airway Patency:  Patent      Post Op Vitals Reviewed: Yes      Staff: CRNA         There were no known notable events for this encounter      BP   147/85   Temp   97 7   Pulse 64   Resp   18   SpO2   91 NC

## 2023-06-23 ENCOUNTER — TELEPHONE (OUTPATIENT)
Dept: NEUROSURGERY | Facility: CLINIC | Age: 43
End: 2023-06-23

## 2023-06-23 PROCEDURE — 63030 LAMOT DCMPRN NRV RT 1 LMBR: CPT | Performed by: NEUROLOGICAL SURGERY

## 2023-06-23 NOTE — TELEPHONE ENCOUNTER
Called patient to see how he is doing after surgery  Patient reports he is doing well overall and denies any incisional issues or fevers  Patient is able to ambulate around the house and complete ADLs  Educated the patient about the importance of preventing blood clots and provided measures how to prevent them  Only has some tenderness at the incision site  Patient has moved his bowels since the surgery  Encouraged patient to take an over the counter stool softener, if he is taking narcotic pain medication  Encouraged fiber intake and fluids  Reviewed incision care with the patient  Advised that after three days he may take a shower and gently wash the surgical site with soap and water  Use clean wash cloth, towels, and clothing  Do not submerge in water until cleared by the surgeon  Do not apply any creams, ointments, or lotions to the site  Patient is aware to call the office if any redness, swelling, drainage, dehiscence of incision, or fever >100 F occurs  Patient is aware to call the office if any concerns or questions may arise  Reminded patient of his upcoming appointments with the date/time/location  Patient was appreciative for the call

## 2023-06-23 NOTE — OP NOTE
OPERATIVE REPORT  PATIENT NAME: Ashkan Porter    :  1980  MRN: [id number]  Pt Location: [facility] OR ROOM 17    SURGERY DATE: 2023    Surgeon(s) and Role:     * Jass Iglesias MD - Primary    Preop Diagnosis:  Lumbar radiculopathy [M54 16]    Post-Op Diagnosis Codes:     * Lumbar radiculopathy [M54 16]    Procedure(s):  Lumbar laminectomy with discectomy left L4/5    Specimen(s):  * No specimens in log *    Estimated Blood Loss:   50 mL    Drains:  * No LDAs found *    Anesthesia Type:   General    Operative Indications:  Lumbar radiculopathy [M54 16]      Operative Findings:  Left L4/5 HNP removed, left L5 nerve root decompressed    Complications:   None    Procedure and Technique:  Findings: General endotracheal anesthesia was induced  Appropriate intravenous antibiotics were given  Serial compression devices were applied to the legs and the patient was placed prone on the Agus table with all pressure points padded  The back was prepped and draped in the usual sterile fashion  Timeout was performed per protocol  A linear incision in the midline over the spinous processes of L4 and L5 was opened up using a #10 blade  The incision was carried down to the dorsal lumbodorsal fascia  Hemostasis was obtained  Fascia was incised using Bovie cautery and the subperiosteal plane was dissected over the left spinous processes  Self-retaining retractors were inserted  X-ray confirmation of the appropriate level was obtained  A radiographic timeout was performed per protocol  Laminotomy was performed using Midas Eddy drill and Kerrison rongeurs  Bone edges were waxed  Origin of the ligamentum flavum was visualized and elevated using a dental tool and removed using Kerrison  Thecal sac and descending nerve root were well visualized  The nerve root was dissected free in the foramen and a foraminotomy was performed using Kerrison    The lateral aspect of the nerve root was then gently dissected and retracted medially using a nerve root retractor  With the nerve root protected, the disc herniation was incised using a #11 blade  Downward medial pressure was used to express disc material   Further disc material was removed using down-biting curettes and pituitary rongeurs  The disc space was entered and some additional disc material was removed in the hopes of preventing recurrence  Valsalva was performed  No evidence of CSF leak was noted  Hemostasis was obtained  The wound was closed in layers of 0 Vicryl, 2-0 Vicryl, 3-0 Monocryl and glue at the skin  Prior to complete closure approximately 10cc of 0 25% Marcaine was instilled in the musculature  Disposition: The patient tolerated the procedure well  The patient was extubated in the OR  The patient went to PACU  Condition: hemodynamically stable  Counts were correct for needles, sponges, and cottonoids  I was present for the entire procedure      Patient Disposition:  PACU  and extubated and stable        SIGNATURE: Edd Franks MD  DATE: June 23, 2023  TIME: 8:32 AM

## 2023-07-06 ENCOUNTER — TELEMEDICINE (OUTPATIENT)
Dept: NEUROSURGERY | Facility: CLINIC | Age: 43
End: 2023-07-06

## 2023-07-06 DIAGNOSIS — Z98.890 POST-OPERATIVE STATE: Primary | ICD-10-CM

## 2023-07-06 PROCEDURE — 99024 POSTOP FOLLOW-UP VISIT: CPT

## 2023-07-06 NOTE — PROGRESS NOTES
Virtual Regular Visit    Verification of patient location:    Patient is located at Home in the following state in which I hold an active license [place]      Assessment/Plan:    Reason for visit is   Chief Complaint   Patient presents with   • Virtual Regular Visit        Encounter provider Neurosurgery Nurse [facility]    Provider located at [address]  [phone]      Recent Visits  No visits were found meeting these conditions. Showing recent visits within past 7 days and meeting all other requirements  Future Appointments  No visits were found meeting these conditions. Showing future appointments within next 150 days and meeting all other requirements       The patient was identified by name and date of birth. Bianca Martinez was informed that this is a telemedicine visit and that the visit is being conducted through Telephone. My office door was closed. No one else was in the room. He acknowledged consent and understanding of privacy and security of the video platform. The patient has agreed to participate and understands they can discontinue the visit at any time. Past Medical History:   Diagnosis Date   • Asthma    • GERD (gastroesophageal reflux disease)     w/o esophagitis   • History of cardiovascular stress test 06/18/2018    Exercise stress echo - Frequent PVCs, brief runs of NSVT. Echo portion negative for ischemia.    • History of COVID-19 11/2021    mild s/s   • Hx of echocardiogram 06/18/2018    negative for ischemia   • Hypertension    • Lumbar disc disease    • PVC's (premature ventricular contractions)    • Seizures ([address])     Early spring / late winter of 2018 was last and only seizure   • Traumatic brain injury ([address]) 04/2012    traumatic injury at home       Past Surgical History:   Procedure Laterality Date   • CARPAL TUNNEL RELEASE Bilateral    • CYST REMOVAL     • PILONIDAL CYST EXCISION     • SD LAMNOTMY INCL W/DCMPRSN NRV ROOT 1 INTRSPC LUMBR N/A 6/22/2023    Procedure: Lumbar laminectomy with discectomy left L4/5;  Surgeon: Mckinley Carrillo MD;  Location: [facility];  Service: Neurosurgery       Current Outpatient Medications   Medication Sig Dispense Refill   • amLODIPine (NORVASC) 5 mg tablet Take 1 tablet (5 mg total) by mouth daily 90 tablet 2   • levETIRAcetam (KEPPRA) 750 mg tablet Take 1 tablet (750 mg total) by mouth 2 (two) times a day 180 tablet 3   • methocarbamol (Robaxin-750) 750 mg tablet Take 1 tablet (750 mg total) by mouth every 6 (six) hours for 7 days 28 tablet 0     No current facility-administered medications for this visit. Allergies   Allergen Reactions   • Nonoxynol 9 Other (See Comments)     Childhood reaction - pt unsure of reaction                                                                                    Post-Op Visit- Neurosurgery    Bianca Martinez 37 y.o. male MRN: [id number]    Chief Complaint:  Patient presents post: Lumbar laminectomy with discectomy left L4/5    History of Present Illness:  Patient reports he is doing well overall and denies any incisional issues or fevers. he denies any new weakness, numbness or tingling since the surgery. Patient denies surgical pain at this time and rates their pain as a  0/10. Assessment:   There were no vitals filed for this visit. Wound Exam: Incision well approximated. No erythema, edema or drainage present. Location: lumbar spine. See image below                Discussion/Summary:  Doing well postoperatively. Reviewed incision care with patient including daily observation for s/s infection including: increased erythema, edema, drainage, dehiscence of incision or fever >101. Should these be observed, he understands that he is to call and/or return immediately for reassessment. Advised patient to continue cleansing area with mild soap and water and pat dry.  Not to apply any lotions, creams, or ointments, & not to submerge in any water for 4 more weeks. He is to maintain activity restrictions until cleared by the surgeon. Activity levels were also reviewed with the patient in detail, he is to lift no greater than 10 pounds and ambulation is encouraged as tolerated. Verified date/time/location of upcoming POV. He is to call the office with any further questions or concerns, or if any incisional issues or fevers would arise.

## 2023-07-20 ENCOUNTER — TELEPHONE (OUTPATIENT)
Dept: FAMILY MEDICINE CLINIC | Facility: CLINIC | Age: 43
End: 2023-07-20

## 2023-07-20 NOTE — TELEPHONE ENCOUNTER
patient called looking to see if insurance paper work he dropped off is completed so he can pick it up?

## 2023-08-08 ENCOUNTER — OFFICE VISIT (OUTPATIENT)
Dept: NEUROSURGERY | Facility: CLINIC | Age: 43
End: 2023-08-08

## 2023-08-08 VITALS
TEMPERATURE: 99 F | WEIGHT: 202 LBS | RESPIRATION RATE: 16 BRPM | BODY MASS INDEX: 28.92 KG/M2 | HEIGHT: 70 IN | DIASTOLIC BLOOD PRESSURE: 72 MMHG | HEART RATE: 74 BPM | OXYGEN SATURATION: 97 % | SYSTOLIC BLOOD PRESSURE: 126 MMHG

## 2023-08-08 DIAGNOSIS — Z98.890 POST-OPERATIVE STATE: Primary | ICD-10-CM

## 2023-08-08 DIAGNOSIS — M54.16 LUMBAR RADICULOPATHY: ICD-10-CM

## 2023-08-08 PROCEDURE — 99024 POSTOP FOLLOW-UP VISIT: CPT | Performed by: NEUROLOGICAL SURGERY

## 2023-08-08 NOTE — PROGRESS NOTES
Review of Systems   Constitutional: Negative. HENT: Negative. Eyes: Negative. Respiratory: Negative. Cardiovascular: Negative. H/o HTN   Gastrointestinal: Negative. Endocrine: Negative. Genitourinary: Negative. Musculoskeletal: Positive for back pain, gait problem and myalgias (left leg cramping). --6 WK POV- Lumbar laminectomy with discectomy left L4/5 done 6/22/23   Skin: Negative. Allergic/Immunologic: Negative. Neurological: Positive for seizures ( H/o Seizure on Keppra, ), weakness and numbness. Hematological: Negative. Psychiatric/Behavioral: Positive for sleep disturbance (due to pain). All other systems reviewed and are negative.          Current Outpatient Medications:   •  amLODIPine (NORVASC) 5 mg tablet, Take 1 tablet (5 mg total) by mouth daily, Disp: 90 tablet, Rfl: 2  •  levETIRAcetam (KEPPRA) 750 mg tablet, Take 1 tablet (750 mg total) by mouth 2 (two) times a day, Disp: 180 tablet, Rfl: 3  •  methocarbamol (Robaxin-750) 750 mg tablet, Take 1 tablet (750 mg total) by mouth every 6 (six) hours for 7 days (Patient not taking: Reported on 7/6/2023), Disp: 28 tablet, Rfl: 0

## 2023-08-08 NOTE — PROGRESS NOTES
50 status post Lumbar laminectomy with discectomy left L4/5 on 6/22/2023     Wound: healed    Medications: none for  back      A/P Doing very well  Offered  PT. He declined. He has core strengthening exercises at home, which he knows will help him  He has been doing brenda and mushroom picking. I advised him against that much activity this soon because of the increased risk of recurrent disk herniation with repeat symptoms and need for another surgery. OK to return to work 8/9/23, full time,  lifting restriction of 25lbs for 2 weeks,   Then full time, full duty after that  He has gotten improvement in his left leg open sore and lymphedema as well as his gait and posture. He and his wife are quite pleased, as am I.   Follow up as needed  All questions answered

## 2023-10-16 DIAGNOSIS — I10 HTN (HYPERTENSION): ICD-10-CM

## 2023-10-16 RX ORDER — AMLODIPINE BESYLATE 5 MG/1
5 TABLET ORAL DAILY
Qty: 90 TABLET | Refills: 2 | Status: SHIPPED | OUTPATIENT
Start: 2023-10-16

## 2023-12-18 ENCOUNTER — TELEPHONE (OUTPATIENT)
Dept: OTHER | Facility: OTHER | Age: 43
End: 2023-12-18

## 2023-12-27 ENCOUNTER — OFFICE VISIT (OUTPATIENT)
Dept: FAMILY MEDICINE CLINIC | Facility: HOME HEALTHCARE | Age: 43
End: 2023-12-27
Payer: COMMERCIAL

## 2023-12-27 VITALS
SYSTOLIC BLOOD PRESSURE: 110 MMHG | HEIGHT: 70 IN | DIASTOLIC BLOOD PRESSURE: 74 MMHG | HEART RATE: 74 BPM | RESPIRATION RATE: 18 BRPM | BODY MASS INDEX: 28.23 KG/M2 | WEIGHT: 197.2 LBS | OXYGEN SATURATION: 98 % | TEMPERATURE: 98.3 F

## 2023-12-27 DIAGNOSIS — R56.9 SEIZURES (HCC): ICD-10-CM

## 2023-12-27 DIAGNOSIS — I87.2 VENOUS INSUFFICIENCY OF LEFT LOWER EXTREMITY: Primary | ICD-10-CM

## 2023-12-27 DIAGNOSIS — I87.332 CHRONIC VENOUS HYPERTENSION WITH ULCER AND INFLAMMATION INVOLVING LEFT SIDE (HCC): ICD-10-CM

## 2023-12-27 PROCEDURE — 99213 OFFICE O/P EST LOW 20 MIN: CPT | Performed by: PHYSICIAN ASSISTANT

## 2023-12-27 RX ORDER — LEVETIRACETAM 750 MG/1
750 TABLET ORAL 2 TIMES DAILY
Qty: 180 TABLET | Refills: 3 | Status: SHIPPED | OUTPATIENT
Start: 2023-12-27

## 2023-12-27 RX ORDER — AMLODIPINE BESYLATE 5 MG/1
5 TABLET ORAL DAILY
Qty: 90 TABLET | Refills: 2 | Status: CANCELLED | OUTPATIENT
Start: 2023-12-27

## 2023-12-27 NOTE — PROGRESS NOTES
Assessment/Plan:     Diagnoses and all orders for this visit:    Venous insufficiency of left lower extremity  -     Ambulatory Referral to Vascular Surgery; Future  -     Ambulatory Referral to Wound Care; Future    Chronic venous hypertension with ulcer and inflammation involving left side (HCC)  -     Ambulatory Referral to Vascular Surgery; Future  -     Ambulatory Referral to Wound Care; Future    Seizures (HCC)  -     levETIRAcetam (KEPPRA) 750 mg tablet; Take 1 tablet (750 mg total) by mouth 2 (two) times a day        - Follow up with wound care as scheduled  - Referral provided for consult with vascular    Return if symptoms worsen or fail to improve.         Depression Screening and Follow-up Plan: Patient was screened for depression during today's encounter. They screened negative with a PHQ-2 score of 0.          Subjective:        Patient ID: Kolton Gaspar is a 43 y.o. male.    Chief Complaint   Patient presents with   • Leg Pain     Lower leg leg pain       Kolton is a 43 year old male with history of HTN, GERD, seizures, and alcoholism, presenting with concern for leg pain.    Patient has a history of LLE venous insufficiency with ulcer.  He has seen Dr. Grayson for this in the past but reports he did not follow up due to lack of insurance at the time.  He reports worsening symptoms recently including swelling, erythema, and drainage from the LLE.  He has an appointment with wound care 1/11.        The following portions of the patient's history were reviewed and updated as appropriate: allergies, current medications, past family history, past medical history, past social history, past surgical history and problem list.    Patient Active Problem List   Diagnosis   • Alcohol withdrawal seizure without complication (HCC)   • Essential hypertension   • GERD without esophagitis   • Seizures (HCC)   • Alcohol abuse   • Venous hypertension, chronic, with ulcer and inflammation, left (HCC)   • Infection  of wound due to methicillin resistant Staphylococcus aureus (MRSA)   • Lumbar degenerative disc disease   • Lumbar radiculopathy   • Chronic pain syndrome   • Smoking       Current Outpatient Medications   Medication Sig Dispense Refill   • amLODIPine (NORVASC) 5 mg tablet Take 1 tablet (5 mg total) by mouth daily 90 tablet 2   • levETIRAcetam (KEPPRA) 750 mg tablet Take 1 tablet (750 mg total) by mouth 2 (two) times a day 180 tablet 3   • methocarbamol (Robaxin-750) 750 mg tablet Take 1 tablet (750 mg total) by mouth every 6 (six) hours for 7 days (Patient not taking: Reported on 7/6/2023) 28 tablet 0     No current facility-administered medications for this visit.        Past Medical History:   Diagnosis Date   • Asthma    • GERD (gastroesophageal reflux disease)     w/o esophagitis   • History of cardiovascular stress test 06/18/2018    Exercise stress echo - Frequent PVCs, brief runs of NSVT.  Echo portion negative for ischemia.   • History of COVID-19 11/2021    mild s/s   • Hx of echocardiogram 06/18/2018    negative for ischemia   • Hypertension    • Lumbar disc disease    • PVC's (premature ventricular contractions)    • Seizures (HCC)     Early spring / late winter of 2018 was last and only seizure   • Traumatic brain injury (HCC) 04/2012    traumatic injury at home        Past Surgical History:   Procedure Laterality Date   • CARPAL TUNNEL RELEASE Bilateral    • CYST REMOVAL     • PILONIDAL CYST EXCISION     • SC LAMNOTMY INCL W/DCMPRSN NRV ROOT 1 INTRSPC LUMBR N/A 6/22/2023    Procedure: Lumbar laminectomy with discectomy left L4/5;  Surgeon: Craig Robert Goldberg, MD;  Location: [facility];  Service: Neurosurgery        Social History     Socioeconomic History   • Marital status: /Civil Union     Spouse name: Not on file   • Number of children: Not on file   • Years of education: Not on file   • Highest education level: Not on file   Occupational History   • Not on file   Tobacco Use   • Smoking  "status: Every Day     Current packs/day: 2.00     Types: Cigarettes   • Smokeless tobacco: Never   Vaping Use   • Vaping status: Never Used   Substance and Sexual Activity   • Alcohol use: Yes     Comment: few beers a weeek on occasion   • Drug use: No   • Sexual activity: Yes     Partners: Female   Other Topics Concern   • Not on file   Social History Narrative    ** Merged History Encounter **          Social Determinants of Health     Financial Resource Strain: Not on file   Food Insecurity: Not on file   Transportation Needs: Not on file   Physical Activity: Not on file   Stress: Not on file   Social Connections: Not on file   Intimate Partner Violence: Not on file   Housing Stability: Not on file        Review of Systems   Constitutional:  Negative for chills, diaphoresis and fever.   Respiratory:  Negative for cough, chest tightness, shortness of breath and wheezing.    Cardiovascular:  Positive for leg swelling. Negative for chest pain and palpitations.   Gastrointestinal:  Negative for abdominal pain, blood in stool, constipation, diarrhea, nausea and vomiting.   Musculoskeletal:  Positive for myalgias.   Skin:  Positive for color change and wound.   Neurological:  Negative for dizziness, syncope, light-headedness and headaches.         Objective:      /74 (BP Location: Left arm, Patient Position: Sitting, Cuff Size: Large)   Pulse 74   Temp 98.3 °F (36.8 °C)   Resp 18   Ht 5' 10\" (1.778 m)   Wt 89.4 kg (197 lb 3.2 oz)   SpO2 98%   BMI 28.30 kg/m²          Physical Exam  Vitals and nursing note reviewed.   Constitutional:       General: He is not in acute distress.     Appearance: Normal appearance.   HENT:      Head: Normocephalic and atraumatic.   Eyes:      Extraocular Movements: Extraocular movements intact.      Conjunctiva/sclera: Conjunctivae normal.      Pupils: Pupils are equal, round, and reactive to light.   Cardiovascular:      Rate and Rhythm: Normal rate.   Pulmonary:      Effort: " Pulmonary effort is normal. No respiratory distress.   Musculoskeletal:      Right lower leg: No edema.      Left lower leg: Edema present.   Skin:     General: Skin is warm.      Findings: Erythema present.      Comments: Stasis dermatitis with skin peeling and wounds to the LLE, see clinical media   Neurological:      General: No focal deficit present.      Mental Status: He is alert and oriented to person, place, and time.      Cranial Nerves: No cranial nerve deficit.   Psychiatric:         Mood and Affect: Mood normal.         Behavior: Behavior normal.

## 2024-01-17 ENCOUNTER — OFFICE VISIT (OUTPATIENT)
Dept: FAMILY MEDICINE CLINIC | Facility: HOME HEALTHCARE | Age: 44
End: 2024-01-17
Payer: COMMERCIAL

## 2024-01-17 ENCOUNTER — TELEPHONE (OUTPATIENT)
Dept: NEUROSURGERY | Facility: CLINIC | Age: 44
End: 2024-01-17

## 2024-01-17 VITALS
RESPIRATION RATE: 18 BRPM | TEMPERATURE: 98.4 F | BODY MASS INDEX: 27.52 KG/M2 | WEIGHT: 192.2 LBS | SYSTOLIC BLOOD PRESSURE: 118 MMHG | OXYGEN SATURATION: 96 % | HEIGHT: 70 IN | DIASTOLIC BLOOD PRESSURE: 80 MMHG | HEART RATE: 86 BPM

## 2024-01-17 DIAGNOSIS — I87.2 VENOUS INSUFFICIENCY OF LEFT LOWER EXTREMITY: Primary | ICD-10-CM

## 2024-01-17 DIAGNOSIS — I87.332 CHRONIC VENOUS HYPERTENSION WITH ULCER AND INFLAMMATION INVOLVING LEFT SIDE (HCC): ICD-10-CM

## 2024-01-17 PROCEDURE — 99213 OFFICE O/P EST LOW 20 MIN: CPT | Performed by: PHYSICIAN ASSISTANT

## 2024-01-17 RX ORDER — LEVETIRACETAM 750 MG/1
750 TABLET ORAL 2 TIMES DAILY
Qty: 180 TABLET | Refills: 3 | Status: CANCELLED | OUTPATIENT
Start: 2024-01-17

## 2024-01-17 RX ORDER — CEPHALEXIN 500 MG/1
500 CAPSULE ORAL EVERY 6 HOURS SCHEDULED
Qty: 20 CAPSULE | Refills: 0 | Status: SHIPPED | OUTPATIENT
Start: 2024-01-17 | End: 2024-01-22

## 2024-01-17 RX ORDER — AMLODIPINE BESYLATE 5 MG/1
5 TABLET ORAL DAILY
Qty: 90 TABLET | Refills: 2 | Status: CANCELLED | OUTPATIENT
Start: 2024-01-17

## 2024-01-17 NOTE — PROGRESS NOTES
Assessment/Plan:     Diagnoses and all orders for this visit:    Venous insufficiency of left lower extremity    Chronic venous hypertension with ulcer and inflammation involving left side (HCC)  -     cephalexin (KEFLEX) 500 mg capsule; Take 1 capsule (500 mg total) by mouth every 6 (six) hours for 5 days          - Follow up with wound care next week as scheduled.  Start keflex QID x 5 days.  Continue to keep the area clean and dry.  Advised scheduling follow up with vascular as previously referred.    Return if symptoms worsen or fail to improve.              Subjective:        Patient ID: Kolton Gaspar is a 43 y.o. male.    Chief Complaint   Patient presents with   • Leg Pain       Kolton is a 43 year old male with history of HTN, GERD, seizures, and alcoholism, presenting with concern for leg pain.    Patient was seen 12/27 for left leg pain.  He has chronic venous insufficiency with ulceration ongoing for 3 years.  He was referred to wound care and vascular at that time.  Unfortunately he had to reschedule his appointment with wound care and is now scheduled for 1/25.  He has not scheduled with vascular.  Today he continues to endorse pain with swelling, drainage, and wound to the LLE.  He has been wrapping the leg himself with gauze.        The following portions of the patient's history were reviewed and updated as appropriate: allergies, current medications, past family history, past medical history, past social history, past surgical history and problem list.    Patient Active Problem List   Diagnosis   • Alcohol withdrawal seizure without complication (HCC)   • Essential hypertension   • GERD without esophagitis   • Seizures (HCC)   • Alcohol abuse   • Venous hypertension, chronic, with ulcer and inflammation, left (HCC)   • Infection of wound due to methicillin resistant Staphylococcus aureus (MRSA)   • Lumbar degenerative disc disease   • Lumbar radiculopathy   • Chronic pain syndrome   • Smoking        Current Outpatient Medications   Medication Sig Dispense Refill   • amLODIPine (NORVASC) 5 mg tablet Take 1 tablet (5 mg total) by mouth daily 90 tablet 2   • cephalexin (KEFLEX) 500 mg capsule Take 1 capsule (500 mg total) by mouth every 6 (six) hours for 5 days 20 capsule 0   • levETIRAcetam (KEPPRA) 750 mg tablet Take 1 tablet (750 mg total) by mouth 2 (two) times a day 180 tablet 3     No current facility-administered medications for this visit.        Past Medical History:   Diagnosis Date   • Asthma    • GERD (gastroesophageal reflux disease)     w/o esophagitis   • History of cardiovascular stress test 06/18/2018    Exercise stress echo - Frequent PVCs, brief runs of NSVT.  Echo portion negative for ischemia.   • History of COVID-19 11/2021    mild s/s   • Hx of echocardiogram 06/18/2018    negative for ischemia   • Hypertension    • Lumbar disc disease    • PVC's (premature ventricular contractions)    • Seizures (HCC)     Early spring / late winter of 2018 was last and only seizure   • Traumatic brain injury (HCC) 04/2012    traumatic injury at home        Past Surgical History:   Procedure Laterality Date   • CARPAL TUNNEL RELEASE Bilateral    • CYST REMOVAL     • PILONIDAL CYST EXCISION     • OK LAMNOTMY INCL W/DCMPRSN NRV ROOT 1 INTRSPC LUMBR N/A 6/22/2023    Procedure: Lumbar laminectomy with discectomy left L4/5;  Surgeon: Craig Robert Goldberg, MD;  Location: [facility];  Service: Neurosurgery        Social History     Socioeconomic History   • Marital status: /Civil Union     Spouse name: Not on file   • Number of children: Not on file   • Years of education: Not on file   • Highest education level: Not on file   Occupational History   • Not on file   Tobacco Use   • Smoking status: Every Day     Current packs/day: 2.00     Types: Cigarettes   • Smokeless tobacco: Never   Vaping Use   • Vaping status: Never Used   Substance and Sexual Activity   • Alcohol use: Yes     Comment: few beers  "a weeek on occasion   • Drug use: No   • Sexual activity: Yes     Partners: Female   Other Topics Concern   • Not on file   Social History Narrative    ** Merged History Encounter **          Social Determinants of Health     Financial Resource Strain: Not on file   Food Insecurity: Not on file   Transportation Needs: Not on file   Physical Activity: Not on file   Stress: Not on file   Social Connections: Not on file   Intimate Partner Violence: Not on file   Housing Stability: Not on file        Review of Systems   Constitutional:  Negative for chills, diaphoresis and fever.   Respiratory:  Negative for cough, chest tightness, shortness of breath and wheezing.    Cardiovascular:  Positive for leg swelling. Negative for chest pain and palpitations.   Gastrointestinal:  Negative for abdominal pain, blood in stool, constipation, diarrhea, nausea and vomiting.   Musculoskeletal:  Positive for myalgias.   Skin:  Positive for color change and wound.   Neurological:  Negative for dizziness, syncope, light-headedness and headaches.         Objective:      /80 (BP Location: Left arm, Patient Position: Sitting, Cuff Size: Large)   Pulse 86   Temp 98.4 °F (36.9 °C)   Resp 18   Ht 5' 10\" (1.778 m)   Wt 87.2 kg (192 lb 3.2 oz)   SpO2 96%   BMI 27.58 kg/m²          Physical Exam  Vitals and nursing note reviewed.   Constitutional:       General: He is not in acute distress.     Appearance: Normal appearance.   HENT:      Head: Normocephalic and atraumatic.   Eyes:      Extraocular Movements: Extraocular movements intact.      Conjunctiva/sclera: Conjunctivae normal.      Pupils: Pupils are equal, round, and reactive to light.   Cardiovascular:      Rate and Rhythm: Normal rate.   Pulmonary:      Effort: Pulmonary effort is normal. No respiratory distress.   Musculoskeletal:      Right lower leg: No edema.      Left lower leg: Edema present.   Skin:     General: Skin is warm.      Findings: Erythema present.      " Comments: Stasis dermatitis with skin peeling and wounds to the LLE, see clinical media   Neurological:      General: No focal deficit present.      Mental Status: He is alert and oriented to person, place, and time.      Cranial Nerves: No cranial nerve deficit.   Psychiatric:         Mood and Affect: Mood normal.         Behavior: Behavior normal.

## 2024-01-17 NOTE — LETTER
January 17, 2024     Patient: Kolton Gaspar  YOB: 1980  Date of Visit: 1/17/2024      To Whom it May Concern:    Kolton Gaspar is under my professional care. Kolton was seen in my office on 1/17/2024. Kolton may return to work on 1/18/24 .    If you have any questions or concerns, please don't hesitate to call.         Sincerely,          Freddy Vee PA-C

## 2024-01-26 ENCOUNTER — HOSPITAL ENCOUNTER (EMERGENCY)
Facility: HOSPITAL | Age: 44
Discharge: HOME | End: 2024-01-26
Attending: EMERGENCY MEDICINE
Payer: COMMERCIAL

## 2024-01-26 VITALS
OXYGEN SATURATION: 95 % | SYSTOLIC BLOOD PRESSURE: 143 MMHG | TEMPERATURE: 97.6 F | HEART RATE: 76 BPM | RESPIRATION RATE: 18 BRPM | DIASTOLIC BLOOD PRESSURE: 83 MMHG

## 2024-01-26 DIAGNOSIS — L97.929 STASIS ULCER OF LEFT LOWER EXTREMITY (HCC): Primary | ICD-10-CM

## 2024-01-26 DIAGNOSIS — I83.029 STASIS ULCER OF LEFT LOWER EXTREMITY (HCC): Primary | ICD-10-CM

## 2024-01-26 DIAGNOSIS — I87.2 VENOUS INSUFFICIENCY OF LEFT LOWER EXTREMITY: ICD-10-CM

## 2024-01-26 DIAGNOSIS — M79.605 LEFT LEG PAIN: ICD-10-CM

## 2024-01-26 PROCEDURE — 99283 EMERGENCY DEPT VISIT LOW MDM: CPT | Performed by: EMERGENCY MEDICINE

## 2024-01-26 PROCEDURE — 99283 EMERGENCY DEPT VISIT LOW MDM: CPT

## 2024-01-26 NOTE — ED ATTENDING ATTESTATION
Final Diagnosis:  1. Stasis ulcer of left lower extremity (HCC)    2. Left leg pain    3. Venous insufficiency of left lower extremity           IMorris MD, saw and evaluated the patient. All available labs and X-rays were ordered by me or the resident and have been reviewed by myself. I discussed the patient with the resident / non-physician and agree with the resident's / non-physician practitioner's findings and plan as documented in the resident's / non-physician practicitioner's note, except where noted.   At this point, I agree with the current assessment done in the ED.   I was present during key portions of all procedures performed unless otherwise stated.     Chief Complaint   Patient presents with    Leg Pain     Patient presents with left lower leg pain. Patient has known wound that he follows up outpt for but missed yesterdays appt due to family emergency and now has increasing pain. Rates pain 8/10      This is a 43 y.o. male presenting for evaluation of leg pain.  Patient states that he has been having left lower leg pain for extended period time.  He has not been able seen wound care.  Feels that it is worsening.  No fever or chills.  No additional trauma.    PMH:   has a past medical history of Asthma, GERD (gastroesophageal reflux disease), History of cardiovascular stress test (06/18/2018), History of COVID-19 (11/2021), echocardiogram (06/18/2018), Hypertension, Lumbar disc disease, PVC's (premature ventricular contractions), Seizures (HCC), and Traumatic brain injury (HCC) (04/2012).    PSH:   has a past surgical history that includes PILONIDAL CYST EXCISION; Cyst Removal; Carpal tunnel release (Bilateral); and pr lamnotmy incl w/dcmprsn nrv root 1 intrspc lumbr (N/A, 6/22/2023).    Procedures     Social:  Social History     Substance and Sexual Activity   Alcohol Use Yes    Comment: few beers a weeek on occasion     Social History     Tobacco Use   Smoking Status Every Day    Current  packs/day: 2.00    Types: Cigarettes   Smokeless Tobacco Never     Social History     Substance and Sexual Activity   Drug Use No     PE:  Vitals:    01/26/24 0813 01/26/24 0915   BP: 110/91 143/83   BP Location: Left arm Left arm   Pulse: 62 76   Resp: 18 18   Temp: 97.6 °F (36.4 °C)    TempSrc: Temporal    SpO2: 95% 95%       A:    Unless otherwise specified above:     General: VS reviewed  Appears in NAD     Head: Normocephalic, atraumatic     CV: No pallor noted  Lungs:   No respiratory distress     Abdomen:  Soft, non-tender, non-distended     MSK:   No obvious deformity     Skin: No obvious rash.     Neuro: Awake, alert, GCS15, CN II-XII grossly intact. Speaking in full sentences.   Motor grossly intact.     Psychiatric/Behavioral: Appropriate mood and affect   Exam: deferred    P:  -Patient's left lower extremity is a dark red to purple at times.  It shows me multiple pictures of old ulcers that are now healing.  Overall does not appear clearly cellulitic.  He has good pulses.  Good cap refill.  - I had a long discussion with the patient about other pathology.  He did have a vascular study done approximately a year ago which showed atypical venous malformations in his lower extremity without any acute occlusions.  No arterial issues.  Overall I believe this to be consistent with his current presentation.  There is no sign of worsening infection.  - Discussed further evaluation versus discharge.  He has close follow-up on Monday as well next week.  He would prefer to have his further evaluation done at that time.  This reasonable.  Return precautions reviewed  - 13 point ROS was performed and all are normal unless stated in the history above.   - Nursing note reviewed. Vitals reviewed.   - Orders placed by myself and/or advanced practitioner / resident.    - Previous chart was reviewed  - No language barrier.   - History obtained from patient.   - There are no limitations to the history obtained.     Unless  otherwise specified:  CC is exclusive from any separately billable procedures  CC is exclusive of treating other patients  CC is exclusive of teaching time     Code Status: No Order  Advance Directive and Living Will:      Power of :    POLST:      Medications - No data to display  No orders to display     No orders of the defined types were placed in this encounter.    Labs Reviewed - No data to display  Time reflects when diagnosis was documented in both MDM as applicable and the Disposition within this note       Time User Action Codes Description Comment    1/26/2024  9:52 AM Tamie Lomeli Add [I83.029,  L97.929] Stasis ulcer of left lower extremity (HCC)     1/26/2024  9:53 AM Tamie Lomeli Add [M79.605] Left leg pain     1/26/2024  9:53 AM Tamie Lomeli [I87.2] Venous insufficiency of left lower extremity           ED Disposition       ED Disposition   Discharge    Condition   Stable    Date/Time   Fri Jan 26, 2024  9:14 AM    Comment   Kolton Gaspar discharge to home/self care.                   Follow-up Information       Follow up With Specialties Details Why Contact Info Additional Information    [facility],  General Surgery, Wound Care   [address] [place] [id number]  [phone]       [facility]   [address] [place] [id number]  [phone] [facility], [address] [place] [address], [phone]   [phone]          Discharge Medication List as of 1/26/2024  9:22 AM        CONTINUE these medications which have NOT CHANGED    Details   amLODIPine (NORVASC) 5 mg tablet Take 1 tablet (5 mg total) by mouth daily, Starting Mon 10/16/2023, Normal      levETIRAcetam (KEPPRA) 750 mg tablet Take 1 tablet (750 mg total) by mouth 2 (two) times a day, Starting Wed 12/27/2023, Normal           No discharge procedures on file.  Prior to Admission Medications  "  Prescriptions Last Dose Informant Patient Reported? Taking?   amLODIPine (NORVASC) 5 mg tablet 1/26/2024  No Yes   Sig: Take 1 tablet (5 mg total) by mouth daily   levETIRAcetam (KEPPRA) 750 mg tablet 1/26/2024  No Yes   Sig: Take 1 tablet (750 mg total) by mouth 2 (two) times a day      Facility-Administered Medications: None       Portions of the record may have been created with voice recognition software. Occasional wrong word or \"sound a like\" substitutions may have occurred due to the inherent limitations of voice recognition software. Read the chart carefully and recognize, using context, where substitutions have occurred.    Electronically signed by:  Morris Hutchison    "

## 2024-01-26 NOTE — Clinical Note
Kolton Gaspar was seen and treated in our emergency department on 1/26/2024.                Diagnosis:     Kolton  may return to work on return date, is off the rest of the shift today.    He may return on this date: 01/29/2024         If you have any questions or concerns, please don't hesitate to call.      Tamie Lomeli MD    ______________________________           _______________          _______________  Hospital Representative                              Date                                Time

## 2024-01-26 NOTE — ED PROVIDER NOTES
"History  Chief Complaint   Patient presents with    Leg Pain     Patient presents with left lower leg pain. Patient has known wound that he follows up outpt for but missed yesterdays appt due to family emergency and now has increasing pain. Rates pain 8/10      Subjective    Kolton Gaspar is a 43 y.o. male, with PMH of venous hypertension, chronic venous insufficiency with ulceration, lumbar radiculopathy, MRSA wound infection , presents today for evaluation of a skin ulcer and associated with it Left leg pain. The ulcer is located on the Left lower leg.  Pain is rated as 8/10, nonradiating.  Patient also reports mild burning and tingling sensations in his left leg and left foot. Ahmet loss of sensation in the affected area, and Left foot, Interventions in the past: Surgical debridement of the wound 3 years ago, seen by surgery several times, and started on antibiotics 9 days ago. Patient admits to tobacco use. Patient is not sure about having a history of diabetes, but endorses family history of \"vein blood clots\".  Patient denies any fevers or chills, shortness of breath, chest pain, any heart problems, nausea or vomiting, or any other pain.    Per chart review, the patient had VAS reflux lower limb venous duplex study with reflux assesment, unilateral, on 12/7/2022 which showed incompetence in multiple veins over the left leg.      History provided by:  Patient  Leg Pain  Location:  Leg  Time since incident:  3 days  Injury: no    Leg location:  L leg  Pain details:     Quality:  Tingling and burning    Radiates to:  Does not radiate    Severity:  Moderate    Onset quality:  Gradual    Duration:  3 days    Timing:  Constant    Progression:  Unchanged  Associated symptoms: back pain        Prior to Admission Medications   Prescriptions Last Dose Informant Patient Reported? Taking?   amLODIPine (NORVASC) 5 mg tablet 1/26/2024  No Yes   Sig: Take 1 tablet (5 mg total) by mouth daily   levETIRAcetam (KEPPRA) " 750 mg tablet 1/26/2024  No Yes   Sig: Take 1 tablet (750 mg total) by mouth 2 (two) times a day      Facility-Administered Medications: None       Past Medical History:   Diagnosis Date    Asthma     GERD (gastroesophageal reflux disease)     w/o esophagitis    History of cardiovascular stress test 06/18/2018    Exercise stress echo - Frequent PVCs, brief runs of NSVT.  Echo portion negative for ischemia.    History of COVID-19 11/2021    mild s/s    Hx of echocardiogram 06/18/2018    negative for ischemia    Hypertension     Lumbar disc disease     PVC's (premature ventricular contractions)     Seizures (HCC)     Early spring / late winter of 2018 was last and only seizure    Traumatic brain injury (HCC) 04/2012    traumatic injury at home       Past Surgical History:   Procedure Laterality Date    CARPAL TUNNEL RELEASE Bilateral     CYST REMOVAL      PILONIDAL CYST EXCISION      DC LAMNOTMY INCL W/DCMPRSN NRV ROOT 1 INTRSPC LUMBR N/A 6/22/2023    Procedure: Lumbar laminectomy with discectomy left L4/5;  Surgeon: Craig Robert Goldberg, MD;  Location:  MAIN OR;  Service: Neurosurgery       Family History   Problem Relation Age of Onset    Stroke Father     Heart attack Father     Coronary artery disease Family         Less than 60 yrs of age    Diabetes Paternal Grandmother     Seizures Neg Hx      I have reviewed and agree with the history as documented.    E-Cigarette/Vaping    E-Cigarette Use Never User      E-Cigarette/Vaping Substances    Nicotine No     THC No     CBD No     Flavoring No     Other No     Unknown No      Social History     Tobacco Use    Smoking status: Every Day     Current packs/day: 2.00     Types: Cigarettes    Smokeless tobacco: Never   Vaping Use    Vaping status: Never Used   Substance Use Topics    Alcohol use: Yes     Comment: few beers a weeek on occasion    Drug use: No        Review of Systems   Constitutional: Negative.    HENT: Negative.     Eyes: Negative.    Respiratory:  Negative.  Negative for cough, chest tightness, shortness of breath and wheezing.    Cardiovascular:  Negative for chest pain and palpitations.   Gastrointestinal: Negative.    Endocrine: Negative.    Genitourinary: Negative.  Negative for hematuria.   Musculoskeletal:  Positive for back pain. Arthralgias: chronic.  Skin: Negative.    Neurological:  Positive for seizures and numbness. Negative for dizziness, light-headedness and headaches.   Psychiatric/Behavioral: Negative.     All other systems reviewed and are negative.      Physical Exam  ED Triage Vitals [01/26/24 0813]   Temperature Pulse Respirations Blood Pressure SpO2   97.6 °F (36.4 °C) 62 18 110/91 95 %      Temp Source Heart Rate Source Patient Position - Orthostatic VS BP Location FiO2 (%)   Temporal Monitor Sitting Left arm --      Pain Score       8             Orthostatic Vital Signs  Vitals:    01/26/24 0813 01/26/24 0915   BP: 110/91 143/83   Pulse: 62 76   Patient Position - Orthostatic VS: Sitting Lying       Physical Exam  Vitals and nursing note reviewed.   Constitutional:       General: He is not in acute distress.     Appearance: Normal appearance. He is not toxic-appearing.   HENT:      Head: Normocephalic and atraumatic.      Mouth/Throat:      Mouth: Mucous membranes are moist.      Pharynx: Oropharynx is clear.   Eyes:      Conjunctiva/sclera: Conjunctivae normal.   Cardiovascular:      Rate and Rhythm: Normal rate and regular rhythm.      Pulses: Normal pulses.      Heart sounds: Normal heart sounds.   Pulmonary:      Effort: Pulmonary effort is normal.      Breath sounds: Normal breath sounds. No wheezing.   Chest:      Chest wall: No tenderness.   Abdominal:      General: Abdomen is flat. Bowel sounds are normal.      Palpations: Abdomen is soft.      Tenderness: There is no guarding or rebound.   Musculoskeletal:         General: Normal range of motion.      Cervical back: Normal range of motion and neck supple.   Skin:     General:  Skin is warm and dry.      Capillary Refill: Capillary refill takes less than 2 seconds.   Neurological:      Mental Status: He is alert and oriented to person, place, and time.   Psychiatric:         Mood and Affect: Mood normal.         Behavior: Behavior normal.         ED Medications  Medications - No data to display    Diagnostic Studies  Results Reviewed       None                   No orders to display         Procedures  Procedures      ED Course  ED Course as of 01/26/24 0953   Fri Jan 26, 2024   0913 Wound dressing                             SBIRT 20yo+      Flowsheet Row Most Recent Value   Initial Alcohol Screen: US AUDIT-C     1. How often do you have a drink containing alcohol? 0 Filed at: 01/26/2024 0812   2. How many drinks containing alcohol do you have on a typical day you are drinking?  0 Filed at: 01/26/2024 0812   3a. Male UNDER 65: How often do you have five or more drinks on one occasion? 0 Filed at: 01/26/2024 0812   3b. FEMALE Any Age, or MALE 65+: How often do you have 4 or more drinks on one occassion? 0 Filed at: 01/26/2024 0812   Audit-C Score 0 Filed at: 01/26/2024 0812   YUMIKO: How many times in the past year have you...    Used an illegal drug or used a prescription medication for non-medical reasons? Never Filed at: 01/26/2024 0812                  Medical Decision Making  Patient is a 43-year-old male with past medical history of chronic venous insufficiency with ulceration, presents with complaints on L leg pain.  He has a long standing history of leg ulcers due to venous stasis, follows with surgery and vascular surgery.  Left lower leg, pretibial region skin is erythematosus, with induration and scales , warm to touch, with signs of chronic venous stasis, ulcer on the medial aspect of the left shin (please see the image), yellow crusts.  Patient was offered pain medications to address his pain, and crutches since he stays for prolonged periods of time at work, but refused  them.  Dressing was applied to the affected area.  Patient has an appointment scheduled for 29 January 2024 with [facility], and an appointment on 31 January 2024 with [facility]  vascular surgery.    Amount and/or Complexity of Data Reviewed  External Data Reviewed: radiology and notes.     Details: VAS reflux lower limb venous duplex study with reflux assesment, unilateral, 12/7/2022: Deep venous incompetence in the common femoral, femoral, popliteal,  and one of the posterior tibial veins.  The great saphenous vein incompetence..  Incompetent  in the mid calf, joining a  posterior tibial vein and a varicose vein.  Cluster of varicose veins adjacent to the non-healing wound.  There is no evidence of deep vein thrombosis in the CFV, the proximal PFV, the  femoral vein and the popliteal vein.            Disposition  Final diagnoses:   Stasis ulcer of left lower extremity (HCC)   Left leg pain   Venous insufficiency of left lower extremity     Time reflects when diagnosis was documented in both MDM as applicable and the Disposition within this note       Time User Action Codes Description Comment    1/26/2024  9:52 AM Tamie Lomeli Add [I83.029,  L97.929] Stasis ulcer of left lower extremity (HCC)     1/26/2024  9:53 AM Tamie Lomeli Add [M79.605] Left leg pain     1/26/2024  9:53 AM Tamie Lomeli Add [I87.2] Venous insufficiency of left lower extremity           ED Disposition       ED Disposition   Discharge    Condition   Stable    Date/Time   Fri Jan 26, 2024 0914    Comment   Kolton Gaspar discharge to home/self care.                   Follow-up Information       Follow up With Specialties Details Why Contact Info Additional Information    Mg Grayson DO General Surgery, Wound Care   [address] [facility] [address]  [phone]       [facility]   [address] [place] [id number] [phone] [facility], [address] [place] [address], [facility], [place], [place], [address]   [phone]            Discharge Medication List as of 1/26/2024  9:22 AM        CONTINUE these medications which have NOT CHANGED    Details   amLODIPine (NORVASC) 5 mg tablet Take 1 tablet (5 mg total) by mouth daily, Starting Mon 10/16/2023, Normal      levETIRAcetam (KEPPRA) 750 mg tablet Take 1 tablet (750 mg total) by mouth 2 (two) times a day, Starting Wed 12/27/2023, Normal           No discharge procedures on file.    PDMP Review       None             ED Provider  Attending physically available and evaluated Kolton Gaspar. I managed the patient along with the ED Attending.    Electronically Signed by           Tamie Lomeli MD  01/26/24 0948       Tamie Lomeli MD  01/26/24 0954

## 2024-01-29 ENCOUNTER — OFFICE VISIT (OUTPATIENT)
Dept: WOUND CARE | Facility: CLINIC | Age: 44
End: 2024-01-29
Payer: COMMERCIAL

## 2024-01-29 DIAGNOSIS — F17.210 CIGARETTE NICOTINE DEPENDENCE WITHOUT COMPLICATION: ICD-10-CM

## 2024-01-29 DIAGNOSIS — I87.332 VENOUS HYPERTENSION, CHRONIC, WITH ULCER AND INFLAMMATION, LEFT (HCC): Primary | ICD-10-CM

## 2024-01-29 PROCEDURE — 99406 BEHAV CHNG SMOKING 3-10 MIN: CPT | Performed by: SURGERY

## 2024-01-29 PROCEDURE — G0463 HOSPITAL OUTPT CLINIC VISIT: HCPCS | Performed by: SURGERY

## 2024-01-29 PROCEDURE — 99213 OFFICE O/P EST LOW 20 MIN: CPT | Performed by: SURGERY

## 2024-01-29 PROCEDURE — 99212 OFFICE O/P EST SF 10 MIN: CPT | Performed by: SURGERY

## 2024-01-29 NOTE — ASSESSMENT & PLAN NOTE
Left lower extremity with noted edema and significant venous stasis disease with inflammation.  No open ulceration noted at this time.  No active drainage noted.  There is tenderness palpation.  Patient does have a follow-up with vascular surgery on Wednesday.  I encouraged him to keep that appointment.  Unfortunately for now there is really nothing we can do from a wound standpoint considering that wounds are not open.  Encouraged him to continue to wear his compression stockings.  Keep his leg elevated when possible.  I also encouraged him to stop smoking.

## 2024-01-29 NOTE — PATIENT INSTRUCTIONS
Orders Placed This Encounter   Procedures    Wound cleansing and dressings     No open wound at this time.     Apply compression stocking to your affected Leg(s) from mid-foot to knee making sure to cover the heel. Apply in the morning and take off at night as needed. Remove at bedtime and elevate legs or lie down.    Avoid prolonged standing in one place.    Elevate leg(s) above the level of the heart when sitting or as much as possible.     Decrease salt intake.     Follow up with vascular on Wednesday.     Standing Status:   Future     Standing Expiration Date:   1/29/2025

## 2024-01-29 NOTE — ASSESSMENT & PLAN NOTE
Smoking cessation counseling took place for 3 minutes in the office today.  We did discuss the role smoking plays in peripheral vascular disease that could be contributing to his pain of the left lower extremity.  Patient states he has cut down considerably and continues to do so as he is limited to the amount of time he has to smoke at work.  I encouraged him to continue to smoke cessation as this will hopefully help with his left lower extremity vascular disease.

## 2024-01-29 NOTE — LETTER
January 29, 2024     Patient: Kolton Gaspar  YOB: 1980  Date of Visit: 1/29/2024      To Whom it May Concern:    Kolton Gaspar is under my professional care. Kolton was seen in my office on 1/29/2024. Kolton may return to work on 1/30/2024 .    If you have any questions or concerns, please don't hesitate to call.         Sincerely,          Mg Grayson,         CC: No Recipients

## 2024-01-29 NOTE — PROGRESS NOTES
Patient ID: Kolton Gaspar is a 43 y.o. male Date of Birth 1980     Chief Complaint  Chief Complaint   Patient presents with    New Patient Visit     Recent Ed visit for venous ulcer. Seeing vascular on Wednesday.        Allergies  Nonoxynol 9    Assessment:    Venous hypertension, chronic, with ulcer and inflammation, left (HCC)  Left lower extremity with noted edema and significant venous stasis disease with inflammation.  No open ulceration noted at this time.  No active drainage noted.  There is tenderness palpation.  Patient does have a follow-up with vascular surgery on Wednesday.  I encouraged him to keep that appointment.  Unfortunately for now there is really nothing we can do from a wound standpoint considering that wounds are not open.  Encouraged him to continue to wear his compression stockings.  Keep his leg elevated when possible.  I also encouraged him to stop smoking.    Cigarette nicotine dependence without complication  Smoking cessation counseling took place for 3 minutes in the office today.  We did discuss the role smoking plays in peripheral vascular disease that could be contributing to his pain of the left lower extremity.  Patient states he has cut down considerably and continues to do so as he is limited to the amount of time he has to smoke at work.  I encouraged him to continue to smoke cessation as this will hopefully help with his left lower extremity vascular disease.     Diagnoses and all orders for this visit:    Venous hypertension, chronic, with ulcer and inflammation, left (HCC)  -     Wound cleansing and dressings; Future    Cigarette nicotine dependence without complication              Notes:    Recent ED note on January 26, 2024 was personally reviewed by me.    Imaging:    Most recent vascular studies which took place back in December 2, 2022 report was personally reviewed by me.  These included vascular venous  reflux and arterial duplex and  ANISHA.        Procedures    Plan:          Wound 04/19/21 Venous Ulcer Pretibial Left (Active)   Date First Assessed/Time First Assessed: 04/19/21 0802   Primary Wound Type: Venous Ulcer  Location: Pretibial  Wound Location Orientation: Left       Wound 06/22/23 Back N/A (Active)   Date First Assessed/Time First Assessed: 06/22/23 1724   Location: Back  Wound Location Orientation: N/A  Incision's 1st Dressing: ADHESIVE SKIN HIGH VISCOSITY EXOFIN 1ML       [REMOVED] Wound 11/20/19 Leg Left;Anterior (Removed)   Resolved Date: 04/19/21  Date First Assessed/Time First Assessed: 11/20/19 1900   Pre-Existing Wound: Yes  Location: Leg  Wound Location Orientation: Left;Anterior  Wound Description (Comments): circular wound on left shin, wound has depth       Subjective:      .    43-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man with noted hypertension, seizures, TBI, peripheral vascular disease, venous stasis disease with ulceration, presents today in consultation for evaluation of the wound of the left lower extremity.  Patient is known to me in the past and has been dealing with venous stasis disease of the lower extremity quite some time.  He states he has his ulceration on his left lower extremity opens and closes drains sporadically.  He does have a very demanding job requires him to be on his feet for nearly 12 hours a day.  He states that when he is off his feet he keeps his legs elevated and does wear his compression stockings which seems to help close up the wound but unfortunately continues to reopen.  Does complain of pain in the left lower extremity.  He was recently seen in the ER for such pain.  He was referred back to the wound center and does have an appointment with vascular surgery on Wednesday.  No nausea vomiting fevers chills.  No other associate symptoms.        The following portions of the patient's history were reviewed and updated as appropriate: He  has a past medical history of Asthma, GERD (gastroesophageal reflux disease),  History of cardiovascular stress test (06/18/2018), History of COVID-19 (11/2021), echocardiogram (06/18/2018), Hypertension, Lumbar disc disease, PVC's (premature ventricular contractions), Seizures ([facility]), and Traumatic brain injury ([facility]) (04/2012).  He   Patient Active Problem List    Diagnosis Date Noted    Cigarette nicotine dependence without complication 01/29/2024    Smoking 06/22/2023    Lumbar radiculopathy 04/27/2023    Chronic pain syndrome 04/27/2023    Lumbar degenerative disc disease 03/08/2023    Infection of wound due to methicillin resistant Staphylococcus aureus (MRSA) 04/23/2021    Venous hypertension, chronic, with ulcer and inflammation, left ([facility]) 04/19/2021    Alcohol abuse 08/01/2019    Seizures ([facility]) 07/09/2019    Essential hypertension 08/02/2018    GERD without esophagitis 08/02/2018    Alcohol withdrawal seizure without complication ([facility])      He  has a past surgical history that includes PILONIDAL CYST EXCISION; Cyst Removal; Carpal tunnel release (Bilateral); and pr lamnotmy incl w/dcmprsn nrv root 1 intrspc lumbr (N/A, 6/22/2023).  His family history includes Coronary artery disease in his family; Diabetes in his paternal grandmother; Heart attack in his father; Stroke in his father.  He  reports that he has been smoking cigarettes. He has never used smokeless tobacco. He reports current alcohol use. He reports that he does not use drugs.  Current Outpatient Medications   Medication Sig Dispense Refill    amLODIPine (NORVASC) 5 mg tablet Take 1 tablet (5 mg total) by mouth daily 90 tablet 2    levETIRAcetam (KEPPRA) 750 mg tablet Take 1 tablet (750 mg total) by mouth 2 (two) times a day 180 tablet 3     No current facility-administered medications for this visit.     He is allergic to nonoxynol 9..    Review of Systems      Review of systems completed, all negative except as noted above HPI.    Objective:            There were no vitals taken for this visit.    Physical Exam  Vitals  reviewed.   Constitutional:       General: He is not in acute distress.     Appearance: Normal appearance. He is not ill-appearing, toxic-appearing or diaphoretic.   HENT:      Head: Normocephalic and atraumatic.      Left Ear: External ear normal.   Eyes:      General: No scleral icterus.        Right eye: No discharge.         Left eye: No discharge.   Cardiovascular:      Rate and Rhythm: Normal rate.   Pulmonary:      Effort: Pulmonary effort is normal. No respiratory distress.   Musculoskeletal:         General: Normal range of motion.      Cervical back: Normal range of motion.      Left lower leg: Edema present.   Skin:     General: Skin is warm.      Coloration: Skin is not jaundiced or pale.      Findings: No bruising.      Comments: Left lower extremity edema noted significantly dry scaly skin.  Evidence of venous stasis disease with skin changes.  No open ulceration noted.  No active drainage.  Tender to palpation.   Neurological:      General: No focal deficit present.      Mental Status: He is alert and oriented to person, place, and time.      Cranial Nerves: No cranial nerve deficit.   Psychiatric:         Mood and Affect: Mood normal.         Behavior: Behavior normal.         Thought Content: Thought content normal.         Judgment: Judgment normal.           Wound Instructions:  Orders Placed This Encounter   Procedures    Wound cleansing and dressings     No open wound at this time.     Apply compression stocking to your affected Leg(s) from mid-foot to knee making sure to cover the heel. Apply in the morning and take off at night as needed. Remove at bedtime and elevate legs or lie down.    Avoid prolonged standing in one place.    Elevate leg(s) above the level of the heart when sitting or as much as possible.     Decrease salt intake.     Follow up with vascular on Wednesday.     Standing Status:   Future     Standing Expiration Date:   1/29/2025        Diagnosis ICD-10-CM Associated Orders    1. Venous hypertension, chronic, with ulcer and inflammation, left (HCC)  I87.332 Wound cleansing and dressings      2. Cigarette nicotine dependence without complication  F17.210

## 2024-01-31 ENCOUNTER — HOSPITAL ENCOUNTER (OUTPATIENT)
Dept: NON INVASIVE DIAGNOSTICS | Facility: HOSPITAL | Age: 44
Discharge: HOME | End: 2024-01-31
Attending: SURGERY
Payer: COMMERCIAL

## 2024-01-31 PROCEDURE — 93925 LOWER EXTREMITY STUDY: CPT

## 2024-01-31 PROCEDURE — 93923 UPR/LXTR ART STDY 3+ LVLS: CPT

## 2024-03-27 ENCOUNTER — OFFICE VISIT (OUTPATIENT)
Dept: FAMILY MEDICINE CLINIC | Facility: CLINIC | Age: 44
End: 2024-03-27
Payer: COMMERCIAL

## 2024-03-27 VITALS
DIASTOLIC BLOOD PRESSURE: 88 MMHG | HEART RATE: 77 BPM | SYSTOLIC BLOOD PRESSURE: 145 MMHG | TEMPERATURE: 98.7 F | OXYGEN SATURATION: 97 % | WEIGHT: 201.6 LBS | HEIGHT: 70 IN | BODY MASS INDEX: 28.86 KG/M2 | RESPIRATION RATE: 18 BRPM

## 2024-03-27 DIAGNOSIS — I10 ESSENTIAL HYPERTENSION: Primary | ICD-10-CM

## 2024-03-27 DIAGNOSIS — I87.332 VENOUS HYPERTENSION, CHRONIC, WITH ULCER AND INFLAMMATION, LEFT (HCC): ICD-10-CM

## 2024-03-27 DIAGNOSIS — F17.200 SMOKING: ICD-10-CM

## 2024-03-27 PROCEDURE — 99213 OFFICE O/P EST LOW 20 MIN: CPT

## 2024-03-27 RX ORDER — LISINOPRIL 20 MG/1
20 TABLET ORAL DAILY
Qty: 30 TABLET | Refills: 3 | Status: SHIPPED | OUTPATIENT
Start: 2024-03-27 | End: 2024-07-25

## 2024-03-27 RX ORDER — NICOTINE 21 MG/24HR
1 PATCH, TRANSDERMAL 24 HOURS TRANSDERMAL EVERY 24 HOURS
Qty: 28 PATCH | Refills: 1 | Status: SHIPPED | OUTPATIENT
Start: 2024-03-27

## 2024-03-27 NOTE — PROGRESS NOTES
Assessment/Plan:    No problem-specific Assessment & Plan notes found for this encounter.       Diagnoses and all orders for this visit:    Essential hypertension  Comments:  D/C amlodipine 2/2 SE of lower extremitiesedema.  Started lisinopril  Follow-up in 5 weeks  Bring home blood pressure log  recheck CMP  Orders:  -     lisinopril (ZESTRIL) 20 mg tablet; Take 1 tablet (20 mg total) by mouth daily  -     Comprehensive metabolic panel; Future    Venous hypertension, chronic, with ulcer and inflammation, left (HCC)  Comments:  -No open ulcers noted  - no Active drainage  -No fever no red flags for infection  -Encouraged to follow-up with vascular surgery  -Seen in wound clinic on 1/29  Orders:  -     Comprehensive metabolic panel; Future  -     Ambulatory Referral to Vascular Surgery; Future  -     Ambulatory Referral to PT/OT Lymphedema Therapy; Future    Smoking  Comments:  Walk since age of 14  smokes  1 -1.5ppd  Avoid Chantix pt has h/o seizures currently on Keppra  Will start nicotine patch 21 mg  FUP  in 4 to 5 wks  Orders:  -     nicotine (NICODERM CQ) 21 mg/24 hr TD 24 hr patch; Place 1 patch on the skin over 24 hours every 24 hours          Subjective:      Patient ID: Kolton Gaspar is a 43 y.o. male.    HPI      Patient is here for routine follow-up.  He is a new patient to me.    Patient is concerned regarding lower extremity venous disease.  Patient reports that he is following up with the wound clinic regarding days and was highly encouraged by wound clinic to follow-up with vascular surgeon.  I did put a referral for vascular specialist.  No open ulcers noted, no active drainage noted today in the clinic.    Encourage patient to wear compression stockings, keep his legs elevated when possible.  Discussed in detail smoking cessation.  At this time patient is agreeable.  We will try nicotine patch.  Chantix is contraindicated since he has history of seizures, and currently on Keppra.    Patient  reports that he has been smoking since age of 14.  he does smoke 1 -1.5ppd .  Patient is agreeable to start nicotine patch.     Additionally upon reviewing his meds it was noticeable that he is taking Norvasc.  I discontinue Norvasc secondary to SE of  lower extremity edema.  I started ACE inhibitors.       Asked Patient to check his blood pressure 2-3 times per week and bring blood pressure home log with him.  Patient reports that he is going through a lot of stress currently at home and at work which also affect his blood pressure.        1/31/2024.  VAS lower limb arterial duplex, complete bilateral     CONCLUSION:  Impression:  RIGHT LOWER LIMB:  No evidence of significant lower extremity arterial occlusive disease.  Ankle/Brachial index:   1.19  which is in the normal range. Prior: 1.34  PVR/ PPG tracings are normal.  Metatarsal pressure of 187 mmHg  Great toe pressure of 102 mmHg, within the healing range     LEFT LOWER LIMB:  No evidence of significant lower extremity arterial occlusive disease.  Ankle/Brachial index:   1.15  which is in the normal range. Prior: 1.22  PVR/ PPG tracings are normal.  Metatarsal pressure of 152 mmHg  Great toe pressure of 106 mmHg, within the healing range     Compared to previous study on 12/2/2022, there is no change.          VAS reflux lower limb venous duplex study with reflux assesment, unilateral 12/2/2022  CONCLUSION:     Impression:  LEFT LIMB:  Deep venous incompetence is noted in the common femoral, femoral, popliteal,  and one of the posterior tibial veins.  The great saphenous vein is incompetent.  The great saphenous vein remains within the saphenous compartment in the thigh.  There is evidence of an incompetent  in the mid calf, joining a  posterior tibial vein and a varicose vein.  There is a cluster of varicose veins noted adjacent to the non-healing wound.  There is no evidence of deep vein thrombosis in the CFV, the proximal PFV, the  femoral vein  "and the popliteal vein.     Study performed with patient in steep Reverse Trendelenburg.  The following portions of the patient's history were reviewed and updated as appropriate: allergies, current medications, past family history, past medical history, past social history, past surgical history, and problem list.    Review of Systems   Constitutional:  Negative for chills and fever.   HENT:  Negative for ear pain and sore throat.    Eyes:  Negative for pain and visual disturbance.   Respiratory:  Negative for cough and shortness of breath.    Cardiovascular:  Negative for chest pain and palpitations.   Gastrointestinal:  Negative for abdominal pain and vomiting.   Genitourinary:  Negative for dysuria and hematuria.   Skin:  Positive for color change, rash and wound.   Neurological:  Negative for seizures and syncope.   All other systems reviewed and are negative.        Objective:      /88 (BP Location: Left arm, Patient Position: Sitting, Cuff Size: Adult)   Pulse 77   Temp 98.7 °F (37.1 °C)   Resp 18   Ht 5' 10\" (1.778 m)   Wt 91.4 kg (201 lb 9.6 oz)   SpO2 97%   BMI 28.93 kg/m²          Physical Exam  Vitals reviewed.   Constitutional:       General: He is not in acute distress.     Appearance: He is not toxic-appearing or diaphoretic.   HENT:      Head: Normocephalic and atraumatic.      Right Ear: Tympanic membrane normal.      Left Ear: Tympanic membrane normal.   Eyes:      Conjunctiva/sclera: Conjunctivae normal.      Pupils: Pupils are equal, round, and reactive to light.   Cardiovascular:      Rate and Rhythm: Normal rate and regular rhythm.      Pulses: Normal pulses.      Heart sounds: Normal heart sounds.   Pulmonary:      Effort: Pulmonary effort is normal.      Breath sounds: Normal breath sounds.   Musculoskeletal:      Right lower leg: Edema present.      Left lower leg: Edema (Please see picture below) present.   Skin:     General: Skin is warm.      Capillary Refill: Capillary " refill takes less than 2 seconds.   Neurological:      General: No focal deficit present.      Mental Status: He is alert and oriented to person, place, and time.   Psychiatric:         Mood and Affect: Mood normal.         Behavior: Behavior normal.         Thought Content: Thought content normal.         Judgment: Judgment normal.

## 2024-05-06 ENCOUNTER — HOSPITAL ENCOUNTER (EMERGENCY)
Facility: HOSPITAL | Age: 44
Discharge: HOME | End: 2024-05-06
Attending: EMERGENCY MEDICINE
Payer: COMMERCIAL

## 2024-05-06 VITALS
RESPIRATION RATE: 18 BRPM | TEMPERATURE: 97.4 F | DIASTOLIC BLOOD PRESSURE: 86 MMHG | HEART RATE: 81 BPM | OXYGEN SATURATION: 94 % | SYSTOLIC BLOOD PRESSURE: 146 MMHG

## 2024-05-06 DIAGNOSIS — L03.90 CELLULITIS: ICD-10-CM

## 2024-05-06 DIAGNOSIS — Z51.89 VISIT FOR WOUND CHECK: Primary | ICD-10-CM

## 2024-05-06 PROCEDURE — 99283 EMERGENCY DEPT VISIT LOW MDM: CPT

## 2024-05-06 PROCEDURE — 99284 EMERGENCY DEPT VISIT MOD MDM: CPT | Performed by: EMERGENCY MEDICINE

## 2024-05-06 RX ORDER — CEPHALEXIN 500 MG/1
500 CAPSULE ORAL EVERY 6 HOURS SCHEDULED
Qty: 20 CAPSULE | Refills: 0 | Status: SHIPPED | OUTPATIENT
Start: 2024-05-06 | End: 2024-05-11

## 2024-05-06 RX ORDER — CEPHALEXIN 250 MG/1
500 CAPSULE ORAL ONCE
Status: COMPLETED | OUTPATIENT
Start: 2024-05-06 | End: 2024-05-06

## 2024-05-06 RX ADMIN — CEPHALEXIN 500 MG: 250 CAPSULE ORAL at 11:02

## 2024-05-06 NOTE — ED PROVIDER NOTES
Final Diagnosis:  1. Visit for wound check    2. Cellulitis        Chief Complaint   Patient presents with    Wound Check     Pt has chronic lymphedema. Scheduled to see a vascular surgeon. Is having increased drainage from wound with swelling. Red in color.      HPI  Patient presents for evaluation of lower extremity wound.  I seen this patient a couple months ago for similar type encounter.  He has been having consistent lymphedema and venous stasis of his left lower extremity for the past couple years.  At that time he had close follow-up with wound care.  Review of record show that he saw wound care approximately 2 days later.  There was no ulceration so they do not believe that there would be any further intervention.  They did order a arterial duplex which was normal.  Suggested follow-up with vascular surgery.  Apparently there was some kind of breakdown as the patient did not have an appointment with vascular surgery at that time.  Recently followed up with primary care who did put him in for referral and he has an appointment with them later this month.  Over the past couple days he has noticed increased erythema and drainage.  Presents now for further evaluation.      Unless otherwise specified:  - No language barrier.   - History obtained from patient.   - There are no limitations to the history obtained.  - Previous charting was reviewed    PMH:   has a past medical history of Asthma, GERD (gastroesophageal reflux disease), History of cardiovascular stress test (06/18/2018), History of COVID-19 (11/2021), echocardiogram (06/18/2018), Hypertension, Lumbar disc disease, PVC's (premature ventricular contractions), Seizures (HCC), and Traumatic brain injury (HCC) (04/2012).    PSH:   has a past surgical history that includes PILONIDAL CYST EXCISION; Cyst Removal; Carpal tunnel release (Bilateral); and pr lamnotmy incl w/dcmprsn nrv root 1 intrspc lumbr (N/A, 6/22/2023).       ROS:  Review of Systems   - 13  point ROS was performed and all are normal unless stated in the history above.   - Nursing note reviewed. Vitals reviewed.   - Orders placed by myself and/or advanced practitioner / resident.        PE:   Vitals:    05/06/24 0941   BP: 146/86   BP Location: Left arm   Pulse: 81   Resp: 18   Temp: (!) 97.4 °F (36.3 °C)   TempSrc: Temporal   SpO2: 94%     Vitals reviewed by me.     Lower extremities picture.  Significant skin breakdown and edema.  Worsening erythema.  Pictures shown.            Unless otherwise specified above:    General: VS reviewed  Appears in NAD    Head: Normocephalic, atraumatic  Eyes: EOM-I.     ENT: Atraumatic external nose and ears.    No drooling.     Neck: No JVD.    CV: No pallor noted  Lungs:   No tachypnea  No respiratory distress    Abdomen:  Soft, non-tender, non-distended    MSK:   No obvious deformity    Skin: Dry, intact. No obvious rash.    Psychiatric/Behavioral: Appropriate mood and affect   Exam: deferred    Physical Exam     Procedures   A:  - Nursing note reviewed.                      No orders to display     No orders of the defined types were placed in this encounter.    Labs Reviewed - No data to display      Final Diagnosis:  1. Visit for wound check    2. Cellulitis        P:  -Discussed options with patient and wife bedside.  Currently I do not believe that I have any significant interventions to improve his current presentation.  He does feel like it is more erythematous and having more pain than usual.  Reasonable to cover for infection.  Will provide with antibiotics.  Provided with a dry dressing.  Reviewed with vascular team.  They suggest continuing with follow-up appointment as well as seeing wound care.  Suggest that he reach out to wound care to try to be seen later this week.  Return precautions reviewed.      Unless otherwise noted the patient's medications were reviewed and they should continue as directed.    Unless otherwise specified:  CC is exclusive  from any separately billable procedures  CC is exclusive of treating other patients  CC is exclusive of teaching time   All splints are static    Medications   cephalexin (KEFLEX) capsule 500 mg (has no administration in time range)     Time reflects when diagnosis was documented in both MDM as applicable and the Disposition within this note       Time User Action Codes Description Comment    5/6/2024 10:27 AM Morris Hutchison Add [Z51.89] Visit for wound check     5/6/2024 10:27 AM Morris Hutchison Add [L03.90] Cellulitis           ED Disposition       ED Disposition   Discharge    Condition   Stable    Date/Time   Mon May 6, 2024 10:27 AM    Comment   Kolton Gaspar discharge to home/self care.                   Follow-up Information       Follow up With Specialties Details Why Contact Info Additional Information    [facility] Schedule an appointment as soon as possible for a visit   [address] [place] [id number] [phone] [facility], [address] [place] [address]  [id number] [phone]          Patient's Medications   Discharge Prescriptions    CEPHALEXIN (KEFLEX) 500 MG CAPSULE    Take 1 capsule (500 mg total) by mouth every 6 (six) hours for 5 days       Start Date: 5/6/2024  End Date: 5/11/2024       Order Dose: 500 mg       Quantity: 20 capsule    Refills: 0     No discharge procedures on file.  Prior to Admission Medications   Prescriptions Last Dose Informant Patient Reported? Taking?   levETIRAcetam (KEPPRA) 750 mg tablet 5/6/2024  No Yes   Sig: Take 1 tablet (750 mg total) by mouth 2 (two) times a day   lisinopril (ZESTRIL) 20 mg tablet 5/6/2024  No Yes   Sig: Take 1 tablet (20 mg total) by mouth daily   nicotine (NICODERM CQ) 21 mg/24 hr TD 24 hr patch   No No   Sig: Place 1 patch on the skin over 24 hours every 24 hours      Facility-Administered Medications: None       Portions of the record may have been created with voice  "recognition software. Occasional wrong word or \"sound a like\" substitutions may have occurred due to the inherent limitations of voice recognition software. Read the chart carefully and recognize, using context, where substitutions have occurred.    Electronically signed by:  MD Morris Mendez MD  05/06/24 1048    "

## 2024-05-07 ENCOUNTER — VBI (OUTPATIENT)
Dept: FAMILY MEDICINE CLINIC | Facility: CLINIC | Age: 44
End: 2024-05-07

## 2024-05-07 NOTE — TELEPHONE ENCOUNTER
05/07/24 9:50 AM    Patient contacted post ED visit, first outreach attempt made. Message was left for patient to return a call to the VBI Department at [facility]: Phone [phone].    Thank you.  Claudette Sandoval  [facility]

## 2024-05-08 NOTE — TELEPHONE ENCOUNTER
05/08/24 9:44 AM    Patient contacted post ED visit, VBI department spoke with patient/caregiver and outreach was successful.    Thank you.  Claudette Sandoval  [facility]

## 2024-05-29 ENCOUNTER — CONSULT (OUTPATIENT)
Dept: VASCULAR SURGERY | Facility: HOSPITAL | Age: 44
End: 2024-05-29
Payer: COMMERCIAL

## 2024-05-29 VITALS
SYSTOLIC BLOOD PRESSURE: 122 MMHG | BODY MASS INDEX: 28.77 KG/M2 | DIASTOLIC BLOOD PRESSURE: 76 MMHG | HEART RATE: 76 BPM | HEIGHT: 70 IN | WEIGHT: 201 LBS

## 2024-05-29 DIAGNOSIS — I87.332 VENOUS HYPERTENSION, CHRONIC, WITH ULCER AND INFLAMMATION, LEFT (HCC): ICD-10-CM

## 2024-05-29 PROCEDURE — 99203 OFFICE O/P NEW LOW 30 MIN: CPT

## 2024-05-29 NOTE — PATIENT INSTRUCTIONS
-Recommend conservative measures with use of daily compression hose (20-30 mmHg), lower extremity elevation, regular exercise, and diligent skin care.   -Script for compression provided today.   -Tubigrip size E  - Obtain LEVR duplex   - Return to office with surgeon with LEVDR for re-evaluation and discussion of surgical options.   - Instructed to call the office in the interim with any questions, concerns, or new symptoms.       Venous Insufficiency   AMBULATORY CARE:   Venous insufficiency  is a condition that prevents blood from flowing out of your legs and back to your heart. Veins contain valves that help blood flow in one direction. Venous insufficiency means the valves do not close correctly or fully. Blood flows back and pools in your leg. This can cause problems such as varicose veins. Venous insufficiency may also be called chronic venous insufficiency or venous stasis.  Common signs and symptoms:   Visible veins on your legs that may be small and red or large, thick, and blue         Swelling in your ankles or calves         Changes in skin color, such as dark or purple skin    An ulcer (open sore) on your leg    Leg pain that is worse when you are menstruating (women) or when you stand, and better when you elevate your legs    Burning or itching    Cramps that happen at night    Thick, hard skin on your legs and ankles    Feeling of heaviness in your legs    Seek care immediately if:   You have a wound that does not heal or is infected.    You have an injury that has broken your skin and caused your varicose veins to bleed.    Your leg is swollen and hard.    You have pain in your leg that does not go away or gets worse.    Your legs or feet are turning blue or black.    Your leg feels warm, tender, and painful. It may look swollen and red.    Call your doctor if:   You have a fever.    You have varicose veins and they are painful.    You have new or worsening leg pain, swelling, or redness.    You have  new or worsening ulcers or other sores on your leg.    You have questions or concerns about your condition or care.    Treatment  may include any of the following:  Medicine  may be given to improve blood flow. The medicines may thin your blood or reduce swelling to help blood flow. You may also need medicine to treat a bacterial infection.    Ablation  is a procedure used to close varicose veins. A catheter is guided until it is near the vein. A device will then be guided to the area. The device may produce energy through radiofrequency or a laser. The energy creates heat that will close the blood vessel.    Sclerotherapy  is a procedure used to fade visible veins. Your healthcare provider will inject a liquid into a spider vein or varicose vein. The liquid causes irritation in the vein. The vein swells and sticks together. Your body will then absorb the vein.    Surgery  may be needed if other treatments do not work. Surgery may be used to repair a leg vein valve or to clip or tie off a vein so blood cannot flow through it. You may need to have a veins removed during surgery called stripping. Surgery may be used to bypass (go around) the damaged vein. Blood will flow through a vein transplanted from another part of your body.    Manage your symptoms:   Wear pressure stockings as directed.  Pressure stockings help keep blood from pooling in your leg veins. Your healthcare provider can prescribe stockings that are right for you. Do not buy over-the-counter pressure stockings unless your healthcare provider says it is okay. They may not fit correctly or may have elastic that cuts off your circulation. Ask your healthcare provider when to start wearing pressure stockings and how long to wear them each day.         Do not sit or stand for long periods of time.  If you have to sit for a long time, flex and extend your legs, feet, and ankles. Do this about 10 times every 30 minutes to help keep blood flowing. If you have  to stand for a long time, take breaks and sit with your legs elevated.    Elevate your legs.  Elevate your legs above the level of your heart to reduce swelling. Your healthcare provider may recommend that you keep your legs elevated for 30 minutes at a time. You may need to do this 3 to 4 times per day, or more if your healthcare provider recommends.         Do not smoke.  Nicotine and other chemicals in cigarettes and cigars can cause blood vessel damage. Ask your healthcare provider for information if you currently smoke and need help to quit. E-cigarettes or smokeless tobacco still contain nicotine. Talk to your healthcare provider before you use these products.    Reach or maintain a healthy weight.  Extra weight can make venous insufficiency worse. Ask your healthcare provider what a healthy weight is for you. He or she can help you create a weight loss plan if you need to lose weight.         Exercise as directed.  Walking can help increase blood flow in your calves. Ask your healthcare provider how much exercise you need each day and which exercises are best for you.         Care for your skin.  Keep your skin clean. Do not use any soaps or lotions that may dry your skin. For example, do not use products that contain fragrance or alcohol. If you have a skin ulcer, your healthcare provider may recommend a wet-to-dry bandage. To do this, apply a wet bandage to your wound and allow it to dry. This will help remove drainage from your wound each time you change the bandage. Your healthcare provider will tell you how often to change your bandage and which kind of bandage to use. Check your wound for signs of infection, such as swelling or pus.    Go to physical therapy (PT) as directed.  A physical therapist can help you increase movement and range of motion in your legs.    Follow up with your doctor as directed:  Write down your questions so you remember to ask them during your visits.  © Copyright [employer] 2023  Information is for End User's use only and may not be sold, redistributed or otherwise used for commercial purposes.  The above information is an  only. It is not intended as medical advice for individual conditions or treatments. Talk to your doctor, nurse or pharmacist before following any medical regimen to see if it is safe and effective for you.

## 2024-05-29 NOTE — PROGRESS NOTES
Assessment/Plan:      45yo male with PMH of alcohol abuse, GERD, HTN, seizures, lumbar radiculopathy, CPS, tobacco use. He was referred by Dr. Rivera (PCP) for consultation regarding a nonhealing LLE ulceration.       Venous hypertension, chronic, with ulcer and inflammation, left (HCC)  Comments:  -4 year history of recurrent left anterior distal shin venous ulcerations. Ulcerations are currently scabbed over , dry with induration. No active signs of infection, weeping or drainage. There is a cluster of varicose veins surrounding the scabbed area. Clinical photos below.  -Wearing daily OTC compression stockings.   -LEVDR 12/2/2022 notable for deep vein and GSV incompetence.   -MARY 1/31/24 no significant arterial occlusive disease. R ANISHA 1.19/187/102; L ANISHA 1.15/152/106.   -Recommend conservative measures with use of daily compression hose (20-30 mmHg), lower extremity elevation, regular exercise, and diligent skin care.   - Obtain LEVR duplex for reassessment.   - Tubigrip size E  - Keep wound clean and dry. Monitor for signs of infection and seek medical attention if symptoms occur.   - Return to office with surgeon with LEVDR for re-evaluation and discussion of surgical options.   - Instructed to call the office in the interim with any questions, concerns, or new symptoms.    Orders:  -     Ambulatory Referral to Vascular Surgery  -     VAS Lower extremity venous insufficiency duplex, Single leg w/ measurements; Future  -     Compression Stocking          Subjective:     Patient ID: Kolton Gaspar is a 44 y.o. male.    SMITA Hi is a 45yo male with PMH of alcohol abuse, GERD, HTN, seizures, lumbar radiculopathy, CPS, tobacco use. He was referred by Dr. Rivera (PCP) for consultation regarding a nonhealing LLE ulceration.     Hx of recurrent venous ulcerations on the lower leg. He states he first had a wound 6 years ago and saw wound care and will spontaneously open on him. He was last seen by wound care  in January, however stopped going due to insurance issues. He states he normally tries to keep the wound covered and will put moisturizer and antibiotic cream on the area, however has not been anything on the wound recently.  He states he was told he had lymphedema in the left leg. He has a family history of venous insufficiency.     On 5/6/24 he was seen in the ED for weeping edema. He was prescribed Keflex and instructed to place petroleum soaked gauze on the wound and follow up with vascular.     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 He recently got a new job role there where he is sitting but still has to stand for long periods. He has difficulty performing his job duties due to his pain and swelling.     He wears daily compression stockings that he got OTC and elevates his legs when able.    He is smoking 2 ppd and not interested in quitting.     He has never had any vascular procedures performed.         Venous Clinical Severity Scores (VCSS)  Item Absent   (0 points)  Mild   (1 point)  Moderate   (2 points)    Severe   (3 points)   Pain [] None [] Occasional  [] Daily  [x] Daily limiting   Varicose Veins [] None [] Few [x] Calf or thigh [] Calf and thigh   Venous edema [] None [] Foot and ankle [x] Above ankle, below knee [] To knee of above   Skin pigmentation [] None [] Perimalleolar  [x] Diffuse, lower 1/3 calf [] Wider, above lower 1/3 calf    Inflammation  [] None [] Perimalleolar [x] Diffuse, lower 1/3 calf [] Wider, above lower 1/3 calf   Induration [] None [] Perimalleolar [x] Diffuse, lower 1/3 calf [] Wider, above lower 1/3 calf   No active ulcers [x] None  [] 1 [] 2 [] > 3   Active ulcer size  [] None [] < 2 cm [x] 2-6 cm [] >6   Ulcer duration [] None  [] <3 months [] 3-12 months [x] >1 year   Compression therapy [] None  [] Intermittent  [] Most days  [x] Fully compliant    Total:  21           CEAP Clinical Classification     [x] Symptomatic    [] Asymptomatic      [] Class 0  No visible or palpable signs of venous disease   [] Class 1  Telangiectasis or reticular veins    [] Class 2  Varicose veins; distinguished from reticular veins by a diameter of 3 mm or more   [] Class 3  Edema   [] Class 4  Changes in skin or subcutaneous tissue secondary to CVD   [] Class   4a  Pigmentation or eczema    [] Class   4b  Lipodermatosclerosis or atrophie ashley   [] Class 5  Healed venous ulcer   [x] Class 6  Active venous ulcer         Review of Systems   Constitutional: Negative.    HENT: Negative.     Eyes: Negative.    Respiratory: Negative.     Cardiovascular:  Positive for leg swelling.   Gastrointestinal: Negative.    Endocrine: Negative.    Genitourinary: Negative.    Musculoskeletal: Negative.    Skin:  Positive for wound.   Allergic/Immunologic: Negative.    Neurological: Negative.    Hematological: Negative.    Psychiatric/Behavioral: Negative.       I have personally reviewed and made appropriate changes to the ROS that was input by the medical assistant.       Objective:     Physical Exam  Vitals and nursing note reviewed.   Constitutional:       Appearance: Normal appearance.   HENT:      Head: Normocephalic and atraumatic.   Cardiovascular:      Rate and Rhythm: Normal rate and regular rhythm.      Pulses:           Carotid pulses are 2+ on the right side and 2+ on the left side.       Radial pulses are 2+ on the right side and 2+ on the left side.        Dorsalis pedis pulses are 2+ on the right side and 2+ on the left side.        Posterior tibial pulses are 2+ on the right side and 2+ on the left side.      Heart sounds: Normal heart sounds.   Pulmonary:      Effort: Pulmonary effort is normal. No respiratory distress.      Breath sounds: Normal breath sounds.   Abdominal:      General: Bowel sounds are normal. There is no distension.      Palpations: Abdomen is soft.       Comments: No abdominal bruit or pulsatile masses.    Musculoskeletal:         General: Tenderness present. No swelling. Normal range of motion.      Cervical back: Normal range of motion and neck supple.   Skin:     General: Skin is warm.      Capillary Refill: Capillary refill takes less than 2 seconds.      Findings: Lesion present.      Comments: Left lower distal shin ulceration appears dried and indurated. No active drainage, open sores, weeping, or signs of infection.    Neurological:      General: No focal deficit present.      Mental Status: He is alert and oriented to person, place, and time.   Psychiatric:         Mood and Affect: Mood normal.         Behavior: Behavior normal.                         Steph Mart PA-C  [facility]  [phone]      I have spent a total time of 35 minutes on 05/29/24 in caring for this patient including Diagnostic results, Prognosis, Risks and benefits of tx options, Instructions for management, Patient and family education, Importance of tx compliance, Risk factor reductions, Impressions, Counseling / Coordination of care, Documenting in the medical record, Reviewing / ordering tests, medicine, procedures  , and Obtaining or reviewing history  .

## 2024-05-29 NOTE — ASSESSMENT & PLAN NOTE
43yo male with PMH of alcohol abuse, GERD, HTN, seizures, lumbar radiculopathy, CPS, tobacco use. He was referred by Dr. Rivera (PCP) for consultation regarding a nonhealing LLE ulceration.     -Recommend  conservative measures with use of daily compression hose (20-30 mmHg), lower extremity elevation, regular exercise, and diligent skin care.   - Obtain LEVR duplex   - Return to office with surgeon with LEVDR for re-evaluation and discussion of surgical options.   - Instructed to call the office in the interim with any questions, concerns, or new symptoms.

## 2024-06-07 ENCOUNTER — HOSPITAL ENCOUNTER (OUTPATIENT)
Dept: NON INVASIVE DIAGNOSTICS | Facility: HOSPITAL | Age: 44
Discharge: HOME | End: 2024-06-07
Payer: COMMERCIAL

## 2024-06-07 DIAGNOSIS — I87.332 VENOUS HYPERTENSION, CHRONIC, WITH ULCER AND INFLAMMATION, LEFT (HCC): ICD-10-CM

## 2024-06-07 PROCEDURE — 93971 EXTREMITY STUDY: CPT

## 2024-06-07 PROCEDURE — 93971 EXTREMITY STUDY: CPT | Performed by: SURGERY

## 2024-06-17 ENCOUNTER — OFFICE VISIT (OUTPATIENT)
Dept: VASCULAR SURGERY | Facility: HOSPITAL | Age: 44
End: 2024-06-17
Attending: EMERGENCY MEDICINE
Payer: COMMERCIAL

## 2024-06-17 ENCOUNTER — TELEPHONE (OUTPATIENT)
Dept: VASCULAR SURGERY | Facility: HOSPITAL | Age: 44
End: 2024-06-17

## 2024-06-17 VITALS
DIASTOLIC BLOOD PRESSURE: 88 MMHG | SYSTOLIC BLOOD PRESSURE: 122 MMHG | TEMPERATURE: 97.6 F | OXYGEN SATURATION: 98 % | WEIGHT: 201.2 LBS | BODY MASS INDEX: 28.8 KG/M2 | HEART RATE: 78 BPM | HEIGHT: 70 IN

## 2024-06-17 DIAGNOSIS — I87.332 VENOUS HYPERTENSION, CHRONIC, WITH ULCER AND INFLAMMATION, LEFT (HCC): ICD-10-CM

## 2024-06-17 DIAGNOSIS — L98.499 ULCER OF EXTREMITY DUE TO CHRONIC VENOUS INSUFFICIENCY  (HCC): Primary | ICD-10-CM

## 2024-06-17 DIAGNOSIS — L97.929 NON-PRESSURE CHRONIC ULCER OF UNSPECIFIED PART OF LEFT LOWER LEG WITH UNSPECIFIED SEVERITY (HCC): ICD-10-CM

## 2024-06-17 DIAGNOSIS — I87.2 ULCER OF EXTREMITY DUE TO CHRONIC VENOUS INSUFFICIENCY  (HCC): Primary | ICD-10-CM

## 2024-06-17 PROCEDURE — 99214 OFFICE O/P EST MOD 30 MIN: CPT | Performed by: SURGERY

## 2024-06-17 RX ORDER — CEFAZOLIN SODIUM 2 G/50ML
2000 SOLUTION INTRAVENOUS ONCE
OUTPATIENT
Start: 2024-06-17 | End: 2024-06-17

## 2024-06-17 RX ORDER — CHLORHEXIDINE GLUCONATE ORAL RINSE 1.2 MG/ML
15 SOLUTION DENTAL ONCE
OUTPATIENT
Start: 2024-06-17 | End: 2024-06-17

## 2024-06-17 NOTE — TELEPHONE ENCOUNTER
Verified patient's insurance   CONFIRMED - Patient's insurance is [employer] [facility]   Is patient requesting a call when authorization has been obtained? Patient did not request a call.    Surgery Date: 7/23/24   Primary Surgeon: SARAH // Tres Braun (NPI: [id number])  Assisting Surgeon: Not Applicable (N/A)  Facility: [facility] (Tax: [id number] / NPI: [id number])  Inpatient / Outpatient: Outpatient  Level: 4    Clearance Received: No clearance ordered.  Consent Received: Yes, scanned into Epic on 6/17/24.  Medication Hold / Last Dose: Not Applicable (N/A)  IR Notified: Not Applicable (N/A)  Rep. Notified:  Vas tech & Venaseal notified 6/18/24  Equipment Needs: Not Applicable (N/A)  Vas Lab Requested: Not Applicable (N/A)  Patient Contacted: 6/17/24    Diagnosis: L98.499, I87.2  Procedure/ CPT Code(s): VenaSeal WITH Stab Phlebectomies of the left upper leg // CPT: 11663, 22929     For varicose vein related procedures:   Last LEVDR:  6/7/24, patient's LEVDR was completed within 12-months of their procedure date.  CEAP Classification:  6  VCSS:  16    Post Operative Date/ Time: TBD      *Please review medication hold(s), PATs, and check H&P with patient.*  PATIENT WAS MAILED SURGERY/SHOWERING/DISCHARGE/COVID INSTRUCTIONS AFTER REVIEWING WITH THEM VIA PHONE CALL.

## 2024-06-17 NOTE — TELEPHONE ENCOUNTER
REMINDER: Under Reason For Call, comments MUST be formatted as:   (Surgeon's Initials) / (Procedure)      Special Instructions / FYI: SCHEDULE AT [facility]    Consent: I certify that patient has signed, printed, timed, and dated their surgery consent.  I certify that the patient's LEGAL NAME and DATE OF BIRTH are written in the upper left corner on BOTH sides of the consent.  I certify that BOTH sides of the completed surgery consent have been scanned into the patient's Epic chart by myself on 6/17/2024.  Yes, I have LABELED the consent in Epic as Consent for Vascular Procedure.     For Surgical Clearances     Levels   1-3   ROUTE this encounter to [facility] Clearance Pool (AND)   [facility] Surgery Coordinator Pool     Level   4   ROUTE this encounter to [facility] Surgery Coordinator Pool       HYDRATION CLEARANCES   ONLY ROUTE TO  [facility] Clearance Pool       Yes, I have ROUTED this encounter to [facility] Surgery Coordinator and/or [facility] Clearance Pool.

## 2024-06-17 NOTE — PROGRESS NOTES
Ambulatory Visit  Name: Kolton Gaspar      : 1980      MRN: [id number]  Encounter Provider: Tres Braun MD  Encounter Date: 2024   Encounter department: [facility]    Assessment & Plan   1. Ulcer of extremity due to chronic venous insufficiency  (HCC)  -     Case request operating room: left lower extremity saphenous vein ablation, multiple stab phlebectomies; Standing  -     Basic metabolic panel; Future  -     CBC and Platelet; Future  -     Case request operating room: left lower extremity saphenous vein ablation, multiple stab phlebectomies  2. Non-pressure chronic ulcer of unspecified part of left lower leg with unspecified severity (HCC)  -     Ambulatory Referral to Vascular Surgery  3. Venous hypertension, chronic, with ulcer and inflammation, left (HCC)  Assessment & Plan:  44-year-old male present to the office for discussion of his left lower extremity varicose veins and chronic venous insufficiency with recurrent ulcers.  Patient has seen wound care in the past for his ulcerations however has had a wound on his ankle which has not fully healed from this for years.  He states he wears compression has not seen any results from this.  The patient had a lower extremity venous duplex with reflux which showed reflux throughout the GSV.  On exam he has visible varicose veins.  The distal aspect of his left lower leg has a mixture of healed and open ulcers as well as erythema and hyperpigmentation.    Discussed with patient pathophysiology of venous insufficiency.  Since patient has wounds I am recommending surgery.  The patient would benefit from a left lower extremity venaseal ablation from the groin to ankle as well as stab phlebectomies of his varicose veins.  I discussed with patient the procedure as well as the risk which include bleeding, infection, phlebitis, recurrence, DVT, PE.  Patient understands and is agreeable.  Consent was signed in  "office  Orders:  -     Ambulatory Referral to Vascular Surgery      History of Present Illness     Kolton Gaspar is a 44 y.o. male      Patient presents today to review LEVDR done 6/7/24 Pt reports LLE varicose veins aching swelling,and wound to LLE that opens on and off.  Pt has been wearing compression. Pt is a current smoker 1 PPD.     Review of Systems   Constitutional: Negative.    HENT: Negative.     Eyes: Negative.    Respiratory: Negative.     Cardiovascular:  Positive for leg swelling.   Gastrointestinal: Negative.    Endocrine: Negative.    Genitourinary: Negative.    Musculoskeletal: Negative.    Skin:  Positive for wound.   Allergic/Immunologic: Negative.    Neurological: Negative.    Hematological:  Bruises/bleeds easily.   Psychiatric/Behavioral: Negative.       Medical History Reviewed by provider this encounter:  Tobacco  Allergies  Meds  Problems  Med Hx  Surg Hx  Fam Hx       Objective     /88 (BP Location: Right arm, Patient Position: Sitting, Cuff Size: Standard)   Pulse 78   Temp 97.6 °F (36.4 °C) (Temporal)   Ht 5' 10\" (1.778 m)   Wt 91.3 kg (201 lb 3.2 oz)   SpO2 98%   BMI 28.87 kg/m²     Physical Exam  Vitals and nursing note reviewed.   Constitutional:       General: He is not in acute distress.     Appearance: He is well-developed.   HENT:      Head: Normocephalic and atraumatic.   Eyes:      Conjunctiva/sclera: Conjunctivae normal.   Cardiovascular:      Rate and Rhythm: Normal rate and regular rhythm.   Pulmonary:      Effort: Pulmonary effort is normal. No respiratory distress.      Breath sounds: Normal breath sounds.   Abdominal:      Palpations: Abdomen is soft.      Tenderness: There is no abdominal tenderness.   Musculoskeletal:      Cervical back: Neck supple.      Right lower leg: No edema.      Left lower leg: Edema present.   Skin:     Capillary Refill: Capillary refill takes less than 2 seconds.      Comments: Left lower leg with mix of open and closed " ulcers, erythema, and hyperpigmentation   Neurological:      Mental Status: He is alert.   Psychiatric:         Mood and Affect: Mood normal.     Administrative Statements   I have spent a total time of 35 minutes on 06/17/24 In caring for this patient including Diagnostic results, Prognosis, Risks and benefits of tx options, Instructions for management, Patient and family education, Importance of tx compliance, Risk factor reductions, Impressions, Counseling / Coordination of care, Documenting in the medical record, Reviewing / ordering tests, medicine, procedures  , and Obtaining or reviewing history  .    EVLT Operative Scheduling Information:    Hospital:  [facility]    Physician:  Aparna    Surgery: Left lower extremity saphenous vein ablation with Venaseal and multiple stab phlebectomies    Urgency:  Standard    Level:  Level 3: Outpatients to be scheduled for elective surgery than can be delayed up to 4 weeks without reasonable expectation of detriment to patient    Case Length:  2 hours    Post-op Bed:  Outpatient    OR Table:  Standard    Equipment Needs:  Rep: Venaseal    Medication Instructions:  None    Hydration:  No    Contrast Allergy:  No    Venous Clinical Severity Scores (VCSS)  Item Absent   (0 points) Mild   (1 point) Moderate   (2 points) Severe   (3 points)   Pain [] None [] Occasional [x] Daily [] Daily limiting   Varicose veins [] None [] Few [x] Calf or thigh [] Calf and thigh   Venous edema [] None [x] Foot and ankle [] Above ankle, below knee [] To knee of above   Skin pigmentation [] None [] Perimalleolar [x] Diffuse, lower 1/3 calf [] Wider, above lower 1/3 calf   Inflammation [] None [x] Perimalleolar [] Diffuse, lower 1/3 calf [] Wider, above lower 1/3 calf   Induration [x] None [] Perimalleolar [] Diffuse, lower 1/3 calf [] Wider, above lower 1/3 calf   No. active ulcers [] None [] 1 [x] 2 [] =3   Active ulcer size [] None [x] <2 cm [] 2 - 6 cm [] >6 cm   Ulcer duration [] None [] <3  months [] 3 - 12 months [x] >1 year   Compression therapy [] None [] Intermittent [x] Most days [] Fully comply   Total 16          CEAP Clinical Classification  [x] Symptomatic   [] Asymptomatic     [] Class 0 No visible or palpable signs of venous disease   [] Class 1 Telangiectasies or reticular veins   [] Class 2 Varicose veins; distinguished from reticular veins by a diameter of 3mm or more   [] Class 3 Edema   [] Class 4 Changes in skin and subcutaneous tissue secondary to CVD    [] Class 4a Pigmentation or eczema   [] Class 4b Lipodermatosclerosis or atrophie ashley   [] Class 5 Healed venous ulcer   [x] Class 6 Active venous ulcer

## 2024-06-17 NOTE — ASSESSMENT & PLAN NOTE
44-year-old male present to the office for discussion of his left lower extremity varicose veins and chronic venous insufficiency with recurrent ulcers.  Patient has seen wound care in the past for his ulcerations however has had a wound on his ankle which has not fully healed from this for years.  He states he wears compression has not seen any results from this.  The patient had a lower extremity venous duplex with reflux which showed reflux throughout the GSV.  On exam he has visible varicose veins.  The distal aspect of his left lower leg has a mixture of healed and open ulcers as well as erythema and hyperpigmentation.    Discussed with patient pathophysiology of venous insufficiency.  Since patient has wounds I am recommending surgery.  The patient would benefit from a left lower extremity venaseal ablation from the groin to ankle as well as stab phlebectomies of his varicose veins.  I discussed with patient the procedure as well as the risk which include bleeding, infection, phlebitis, recurrence, DVT, PE.  Patient understands and is agreeable.  Consent was signed in office

## 2024-06-18 DIAGNOSIS — I87.332 VENOUS HYPERTENSION, CHRONIC, WITH ULCER AND INFLAMMATION, LEFT (HCC): ICD-10-CM

## 2024-06-18 DIAGNOSIS — I87.2 ULCER OF EXTREMITY DUE TO CHRONIC VENOUS INSUFFICIENCY  (HCC): Primary | ICD-10-CM

## 2024-06-18 DIAGNOSIS — L98.499 ULCER OF EXTREMITY DUE TO CHRONIC VENOUS INSUFFICIENCY  (HCC): Primary | ICD-10-CM

## 2024-07-09 ENCOUNTER — APPOINTMENT (OUTPATIENT)
Dept: LAB | Facility: HOSPITAL | Age: 44
End: 2024-07-09
Payer: COMMERCIAL

## 2024-07-09 DIAGNOSIS — I87.2 ULCER OF EXTREMITY DUE TO CHRONIC VENOUS INSUFFICIENCY  (HCC): ICD-10-CM

## 2024-07-09 DIAGNOSIS — L98.499 ULCER OF EXTREMITY DUE TO CHRONIC VENOUS INSUFFICIENCY  (HCC): ICD-10-CM

## 2024-07-09 LAB
ANION GAP SERPL CALCULATED.3IONS-SCNC: 10 MMOL/L (ref 4–13)
BUN SERPL-MCNC: 14 MG/DL (ref 5–25)
CALCIUM SERPL-MCNC: 10 MG/DL (ref 8.4–10.2)
CHLORIDE SERPL-SCNC: 99 MMOL/L (ref 96–108)
CO2 SERPL-SCNC: 24 MMOL/L (ref 21–32)
CREAT SERPL-MCNC: 0.95 MG/DL (ref 0.6–1.3)
ERYTHROCYTE [DISTWIDTH] IN BLOOD BY AUTOMATED COUNT: 12.2 % (ref 11.6–15.1)
GFR SERPL CREATININE-BSD FRML MDRD: 95 ML/MIN/1.73SQ M
GLUCOSE SERPL-MCNC: 104 MG/DL (ref 65–140)
HCT VFR BLD AUTO: 43.3 % (ref 36.5–49.3)
HGB BLD-MCNC: 14.8 G/DL (ref 12–17)
MCH RBC QN AUTO: 31.4 PG (ref 26.8–34.3)
MCHC RBC AUTO-ENTMCNC: 34.2 G/DL (ref 31.4–37.4)
MCV RBC AUTO: 92 FL (ref 82–98)
PLATELET # BLD AUTO: 261 THOUSANDS/UL (ref 149–390)
PMV BLD AUTO: 8.6 FL (ref 8.9–12.7)
POTASSIUM SERPL-SCNC: 4 MMOL/L (ref 3.5–5.3)
RBC # BLD AUTO: 4.71 MILLION/UL (ref 3.88–5.62)
SODIUM SERPL-SCNC: 133 MMOL/L (ref 135–147)
WBC # BLD AUTO: 9.02 THOUSAND/UL (ref 4.31–10.16)

## 2024-07-09 PROCEDURE — 85027 COMPLETE CBC AUTOMATED: CPT

## 2024-07-09 PROCEDURE — 36415 COLL VENOUS BLD VENIPUNCTURE: CPT

## 2024-07-09 PROCEDURE — 80048 BASIC METABOLIC PNL TOTAL CA: CPT

## 2024-07-10 NOTE — PRE-PROCEDURE INSTRUCTIONS
Pre-Surgery Instructions:   Medication Instructions    levETIRAcetam (KEPPRA) 750 mg tablet Take day of surgery.    lisinopril (ZESTRIL) 20 mg tablet Hold day of surgery.        Medication instructions for day surgery reviewed. Please use only a sip of water to take your instructed medications. Avoid all over the counter vitamins, supplements and NSAIDS for one week prior to surgery per anesthesia guidelines. Tylenol is ok to take as needed.     You will receive a call one business day prior to surgery with an arrival time and hospital directions. If your surgery is scheduled on a Monday, the hospital will be calling you on the Friday prior to your surgery. If you have not heard from anyone by 8pm, please call the hospital supervisor through the hospital  at [phone]. ([place] [phone] or [place] [phone]).    Do not eat or drink anything after midnight the night before your surgery, including candy, mints, lifesavers, or chewing gum. Do not drink alcohol 24hrs before your surgery. Try not to smoke at least 24hrs before your surgery.       Follow the pre surgery showering instructions as listed in the “My Surgical Experience Booklet” or otherwise provided by your surgeon's office. Do not use a blade to shave the surgical area 1 week before surgery. It is okay to use a clean electric clippers up to 24 hours before surgery. Do not apply any lotions, creams, including makeup, cologne, deodorant, or perfumes after showering on the day of your surgery. Do not use dry shampoo, hair spray, hair gel, or any type of hair products.     No contact lenses, eye make-up, or artificial eyelashes. Remove nail polish, including gel polish, and any artificial, gel, or acrylic nails if possible. Remove all jewelry including rings and body piercing jewelry.     Wear causal clothing that is easy to take on and off. Consider your type of surgery.    Keep any valuables, jewelry, piercings at home. Please bring any  specially ordered equipment (sling, braces) if indicated.    Arrange for a responsible person to drive you to and from the hospital on the day of your surgery. Please confirm the visitor policy for the day of your procedure when you receive your phone call with an arrival time.     Call the surgeon's office with any new illnesses, exposures, or additional questions prior to surgery.    Please reference your “My Surgical Experience Booklet” for additional information to prepare for your upcoming surgery.

## 2024-07-23 ENCOUNTER — APPOINTMENT (OUTPATIENT)
Dept: NON INVASIVE DIAGNOSTICS | Facility: HOSPITAL | Age: 44
End: 2024-07-23
Payer: COMMERCIAL

## 2024-07-23 ENCOUNTER — ANESTHESIA EVENT (OUTPATIENT)
Dept: PERIOP | Facility: HOSPITAL | Age: 44
End: 2024-07-23
Payer: COMMERCIAL

## 2024-07-23 ENCOUNTER — ANESTHESIA (OUTPATIENT)
Dept: PERIOP | Facility: HOSPITAL | Age: 44
End: 2024-07-23
Payer: COMMERCIAL

## 2024-07-23 ENCOUNTER — HOSPITAL ENCOUNTER (OUTPATIENT)
Facility: HOSPITAL | Age: 44
Setting detail: OUTPATIENT SURGERY
Discharge: HOME | End: 2024-07-23
Attending: SURGERY | Admitting: SURGERY
Payer: COMMERCIAL

## 2024-07-23 ENCOUNTER — HOSPITAL ENCOUNTER (OUTPATIENT)
Dept: NON INVASIVE DIAGNOSTICS | Facility: HOSPITAL | Age: 44
End: 2024-07-23
Attending: SURGERY
Payer: COMMERCIAL

## 2024-07-23 VITALS
SYSTOLIC BLOOD PRESSURE: 152 MMHG | TEMPERATURE: 112 F | BODY MASS INDEX: 28.77 KG/M2 | HEART RATE: 65 BPM | WEIGHT: 201 LBS | RESPIRATION RATE: 18 BRPM | OXYGEN SATURATION: 95 % | DIASTOLIC BLOOD PRESSURE: 92 MMHG | HEIGHT: 70 IN

## 2024-07-23 DIAGNOSIS — I87.2 ULCER OF EXTREMITY DUE TO CHRONIC VENOUS INSUFFICIENCY  (HCC): Primary | ICD-10-CM

## 2024-07-23 DIAGNOSIS — L98.499 ULCER OF EXTREMITY DUE TO CHRONIC VENOUS INSUFFICIENCY  (HCC): Primary | ICD-10-CM

## 2024-07-23 PROCEDURE — C1769 GUIDE WIRE: HCPCS | Performed by: SURGERY

## 2024-07-23 PROCEDURE — 36482 ENDOVEN THER CHEM ADHES 1ST: CPT

## 2024-07-23 PROCEDURE — 36482 ENDOVEN THER CHEM ADHES 1ST: CPT | Performed by: SURGERY

## 2024-07-23 PROCEDURE — C1888 ENDOVAS NON-CARDIAC ABL CATH: HCPCS | Performed by: SURGERY

## 2024-07-23 PROCEDURE — 93971 EXTREMITY STUDY: CPT

## 2024-07-23 PROCEDURE — NC001 PR NO CHARGE: Performed by: SURGERY

## 2024-07-23 PROCEDURE — 37765 STAB PHLEB VEINS XTR 10-20: CPT | Performed by: SURGERY

## 2024-07-23 PROCEDURE — 37765 STAB PHLEB VEINS XTR 10-20: CPT

## 2024-07-23 DEVICE — CLOSURE SYSTEM VS-404  VENASEAL US EA
Type: IMPLANTABLE DEVICE | Site: VEIN | Status: FUNCTIONAL
Brand: VENASEAL™

## 2024-07-23 RX ORDER — SODIUM CHLORIDE, SODIUM LACTATE, POTASSIUM CHLORIDE, CALCIUM CHLORIDE 600; 310; 30; 20 MG/100ML; MG/100ML; MG/100ML; MG/100ML
50 INJECTION, SOLUTION INTRAVENOUS CONTINUOUS
Status: DISCONTINUED | OUTPATIENT
Start: 2024-07-23 | End: 2024-07-23 | Stop reason: HOSPADM

## 2024-07-23 RX ORDER — SODIUM CHLORIDE, SODIUM LACTATE, POTASSIUM CHLORIDE, CALCIUM CHLORIDE 600; 310; 30; 20 MG/100ML; MG/100ML; MG/100ML; MG/100ML
100 INJECTION, SOLUTION INTRAVENOUS CONTINUOUS
Status: DISCONTINUED | OUTPATIENT
Start: 2024-07-23 | End: 2024-07-23 | Stop reason: HOSPADM

## 2024-07-23 RX ORDER — GLYCOPYRROLATE 0.2 MG/ML
INJECTION INTRAMUSCULAR; INTRAVENOUS AS NEEDED
Status: DISCONTINUED | OUTPATIENT
Start: 2024-07-23 | End: 2024-07-23

## 2024-07-23 RX ORDER — ALBUTEROL SULFATE 2.5 MG/3ML
2.5 SOLUTION RESPIRATORY (INHALATION) ONCE AS NEEDED
Status: DISCONTINUED | OUTPATIENT
Start: 2024-07-23 | End: 2024-07-23 | Stop reason: HOSPADM

## 2024-07-23 RX ORDER — MAGNESIUM HYDROXIDE 1200 MG/15ML
LIQUID ORAL AS NEEDED
Status: DISCONTINUED | OUTPATIENT
Start: 2024-07-23 | End: 2024-07-23 | Stop reason: HOSPADM

## 2024-07-23 RX ORDER — HYDROMORPHONE HCL/PF 1 MG/ML
0.4 SYRINGE (ML) INJECTION
Status: DISCONTINUED | OUTPATIENT
Start: 2024-07-23 | End: 2024-07-23 | Stop reason: HOSPADM

## 2024-07-23 RX ORDER — HYDROMORPHONE HCL/PF 1 MG/ML
SYRINGE (ML) INJECTION AS NEEDED
Status: DISCONTINUED | OUTPATIENT
Start: 2024-07-23 | End: 2024-07-23

## 2024-07-23 RX ORDER — SODIUM CHLORIDE, SODIUM LACTATE, POTASSIUM CHLORIDE, CALCIUM CHLORIDE 600; 310; 30; 20 MG/100ML; MG/100ML; MG/100ML; MG/100ML
INJECTION, SOLUTION INTRAVENOUS CONTINUOUS PRN
Status: DISCONTINUED | OUTPATIENT
Start: 2024-07-23 | End: 2024-07-23

## 2024-07-23 RX ORDER — KETOROLAC TROMETHAMINE 30 MG/ML
INJECTION, SOLUTION INTRAMUSCULAR; INTRAVENOUS AS NEEDED
Status: DISCONTINUED | OUTPATIENT
Start: 2024-07-23 | End: 2024-07-23

## 2024-07-23 RX ORDER — MIDAZOLAM HYDROCHLORIDE 2 MG/2ML
INJECTION, SOLUTION INTRAMUSCULAR; INTRAVENOUS AS NEEDED
Status: DISCONTINUED | OUTPATIENT
Start: 2024-07-23 | End: 2024-07-23

## 2024-07-23 RX ORDER — CEFAZOLIN SODIUM 2 G/50ML
2000 SOLUTION INTRAVENOUS ONCE
Status: COMPLETED | OUTPATIENT
Start: 2024-07-23 | End: 2024-07-23

## 2024-07-23 RX ORDER — TRAMADOL HYDROCHLORIDE 50 MG/1
50 TABLET ORAL EVERY 6 HOURS PRN
Qty: 5 TABLET | Refills: 0 | Status: SHIPPED | OUTPATIENT
Start: 2024-07-23

## 2024-07-23 RX ORDER — PROMETHAZINE HYDROCHLORIDE 25 MG/ML
25 INJECTION, SOLUTION INTRAMUSCULAR; INTRAVENOUS ONCE AS NEEDED
Status: DISCONTINUED | OUTPATIENT
Start: 2024-07-23 | End: 2024-07-23 | Stop reason: HOSPADM

## 2024-07-23 RX ORDER — CHLORHEXIDINE GLUCONATE ORAL RINSE 1.2 MG/ML
15 SOLUTION DENTAL ONCE
Status: COMPLETED | OUTPATIENT
Start: 2024-07-23 | End: 2024-07-23

## 2024-07-23 RX ORDER — DEXAMETHASONE SODIUM PHOSPHATE 10 MG/ML
INJECTION, SOLUTION INTRAMUSCULAR; INTRAVENOUS AS NEEDED
Status: DISCONTINUED | OUTPATIENT
Start: 2024-07-23 | End: 2024-07-23

## 2024-07-23 RX ORDER — LIDOCAINE HYDROCHLORIDE 20 MG/ML
INJECTION, SOLUTION EPIDURAL; INFILTRATION; INTRACAUDAL; PERINEURAL AS NEEDED
Status: DISCONTINUED | OUTPATIENT
Start: 2024-07-23 | End: 2024-07-23

## 2024-07-23 RX ORDER — FENTANYL CITRATE 50 UG/ML
INJECTION, SOLUTION INTRAMUSCULAR; INTRAVENOUS AS NEEDED
Status: DISCONTINUED | OUTPATIENT
Start: 2024-07-23 | End: 2024-07-23

## 2024-07-23 RX ORDER — ONDANSETRON 2 MG/ML
INJECTION INTRAMUSCULAR; INTRAVENOUS AS NEEDED
Status: DISCONTINUED | OUTPATIENT
Start: 2024-07-23 | End: 2024-07-23

## 2024-07-23 RX ORDER — PROPOFOL 10 MG/ML
INJECTION, EMULSION INTRAVENOUS AS NEEDED
Status: DISCONTINUED | OUTPATIENT
Start: 2024-07-23 | End: 2024-07-23

## 2024-07-23 RX ADMIN — PROPOFOL 100 MG: 10 INJECTION, EMULSION INTRAVENOUS at 09:11

## 2024-07-23 RX ADMIN — KETOROLAC TROMETHAMINE 15 MG: 30 INJECTION, SOLUTION INTRAMUSCULAR; INTRAVENOUS at 10:50

## 2024-07-23 RX ADMIN — FENTANYL CITRATE 50 MCG: 50 INJECTION, SOLUTION INTRAMUSCULAR; INTRAVENOUS at 09:28

## 2024-07-23 RX ADMIN — HYDROMORPHONE HYDROCHLORIDE 0.1 MG: 1 INJECTION, SOLUTION INTRAMUSCULAR; INTRAVENOUS; SUBCUTANEOUS at 10:37

## 2024-07-23 RX ADMIN — SODIUM CHLORIDE, SODIUM LACTATE, POTASSIUM CHLORIDE, AND CALCIUM CHLORIDE: .6; .31; .03; .02 INJECTION, SOLUTION INTRAVENOUS at 09:05

## 2024-07-23 RX ADMIN — DEXAMETHASONE SODIUM PHOSPHATE 4 MG: 10 INJECTION, SOLUTION INTRAMUSCULAR; INTRAVENOUS at 09:14

## 2024-07-23 RX ADMIN — LIDOCAINE HYDROCHLORIDE 100 MG: 20 INJECTION, SOLUTION EPIDURAL; INFILTRATION; INTRACAUDAL; PERINEURAL at 09:10

## 2024-07-23 RX ADMIN — PROPOFOL 50 MG: 10 INJECTION, EMULSION INTRAVENOUS at 09:12

## 2024-07-23 RX ADMIN — HYDROMORPHONE HYDROCHLORIDE 0.1 MG: 1 INJECTION, SOLUTION INTRAMUSCULAR; INTRAVENOUS; SUBCUTANEOUS at 10:07

## 2024-07-23 RX ADMIN — HYDROMORPHONE HYDROCHLORIDE 0.2 MG: 1 INJECTION, SOLUTION INTRAMUSCULAR; INTRAVENOUS; SUBCUTANEOUS at 10:25

## 2024-07-23 RX ADMIN — FENTANYL CITRATE 50 MCG: 50 INJECTION, SOLUTION INTRAMUSCULAR; INTRAVENOUS at 09:10

## 2024-07-23 RX ADMIN — SODIUM CHLORIDE, SODIUM LACTATE, POTASSIUM CHLORIDE, AND CALCIUM CHLORIDE 100 ML/HR: .6; .31; .03; .02 INJECTION, SOLUTION INTRAVENOUS at 08:21

## 2024-07-23 RX ADMIN — CEFAZOLIN SODIUM 2000 MG: 2 SOLUTION INTRAVENOUS at 09:15

## 2024-07-23 RX ADMIN — HYDROMORPHONE HYDROCHLORIDE 0.1 MG: 1 INJECTION, SOLUTION INTRAMUSCULAR; INTRAVENOUS; SUBCUTANEOUS at 10:02

## 2024-07-23 RX ADMIN — CHLORHEXIDINE GLUCONATE 15 ML: 1.2 RINSE ORAL at 08:22

## 2024-07-23 RX ADMIN — ONDANSETRON 4 MG: 2 INJECTION INTRAMUSCULAR; INTRAVENOUS at 10:39

## 2024-07-23 RX ADMIN — PROPOFOL 100 MG: 10 INJECTION, EMULSION INTRAVENOUS at 09:10

## 2024-07-23 RX ADMIN — PROPOFOL 50 MG: 10 INJECTION, EMULSION INTRAVENOUS at 10:37

## 2024-07-23 RX ADMIN — MIDAZOLAM 2 MG: 1 INJECTION INTRAMUSCULAR; INTRAVENOUS at 09:05

## 2024-07-23 NOTE — H&P
H&P Exam - Vascular Surgery   Kolton Gaspar 44 y.o. male MRN: [id number]  Unit/Bed#: [facility] [place] Encounter: [id number]    Assessment & Plan     Assessment/Plan:  44-year-old male present to the office for discussion of his left lower extremity varicose veins and chronic venous insufficiency with recurrent ulcers.  He states he wears compression has not seen any results from this.  The patient had a lower extremity venous duplex with reflux which showed reflux throughout the GSV.  On exam he has visible varicose veins.  The distal aspect of his left lower leg has a mixture of healed and open ulcers as well as erythema and hyperpigmentation.     Discussed with patient pathophysiology of venous insufficiency.  Since patient has wounds I am recommending surgery.  Plan for today is for a left lower extremity venaseal ablation from the groin to ankle as well as stab phlebectomies of his varicose veins.  I discussed with patient the procedure as well as the risk which include bleeding, infection, phlebitis, recurrence, DVT, PE.  Patient understands and is agreeable.  Consent was signed in office    History of Present Illness   HPI:  Kolton Gaspar is a 44 y.o. male who presents with chrnoic venous insufficiency with chronic skin changes and recurrent ulcers.    Review of Systems    Historical Information   Past Medical History:   Diagnosis Date    Asthma     GERD (gastroesophageal reflux disease)     w/o esophagitis    History of cardiovascular stress test 06/18/2018    Exercise stress echo - Frequent PVCs, brief runs of NSVT.  Echo portion negative for ischemia.    History of COVID-19 11/2021    mild s/s    Hx of echocardiogram 06/18/2018    negative for ischemia    Hypertension     Lumbar disc disease     PVC's (premature ventricular contractions)     Seizures (HCC)     Early spring / late winter of 2018 was last and only seizure    Traumatic brain injury (HCC) 04/2012    traumatic injury at home     Past Surgical  "History:   Procedure Laterality Date    BACK SURGERY      CARPAL TUNNEL RELEASE Bilateral     CYST REMOVAL      PILONIDAL CYST EXCISION      DC LAMNOTMY INCL W/DCMPRSN NRV ROOT 1 INTRSPC LUMBR N/A 06/22/2023    Procedure: Lumbar laminectomy with discectomy left L4/5;  Surgeon: Craig Robert Goldberg, MD;  Location: [facility];  Service: Neurosurgery     Social History   Social History     Substance and Sexual Activity   Alcohol Use Yes    Comment: 2 x month     Social History     Substance and Sexual Activity   Drug Use No     Social History     Tobacco Use   Smoking Status Every Day    Current packs/day: 1.00    Types: Cigarettes   Smokeless Tobacco Never     E-Cigarette/Vaping    E-Cigarette Use Never User      E-Cigarette/Vaping Substances    Nicotine No     THC No     CBD No     Flavoring No     Other No     Unknown No      Family History: non-contributory    Meds/Allergies   all current active meds have been reviewed  Allergies   Allergen Reactions    Nonoxynol 9 Other (See Comments)     Childhood reaction - pt unsure of reaction       Objective   Vitals: Blood pressure 121/80, pulse 65, temperature 99 °F (37.2 °C), temperature source Temporal, resp. rate 18, height 5' 10\" (1.778 m), weight 91.2 kg (201 lb), SpO2 96%.,Body mass index is 28.84 kg/m².  No intake or output data in the 24 hours ending 07/23/24 0846  Invasive Devices       Peripheral Intravenous Line  Duration             Peripheral IV 06/22/23 Dorsal (posterior);Left Hand 396 days    Peripheral IV 07/23/24 Dorsal (posterior);Right Hand <1 day                    Physical Exam  Vitals and nursing note reviewed.   Constitutional:       Appearance: He is well-developed.   HENT:      Head: Normocephalic and atraumatic.   Eyes:      Conjunctiva/sclera: Conjunctivae normal.   Cardiovascular:      Rate and Rhythm: Normal rate and regular rhythm.   Pulmonary:      Effort: Pulmonary effort is normal.      Breath sounds: Normal breath sounds.   Abdominal:      " Palpations: Abdomen is soft.      Tenderness: There is no abdominal tenderness.   Musculoskeletal:      Cervical back: Neck supple.      Left lower leg: Edema present.   Skin:     Capillary Refill: Capillary refill takes less than 2 seconds.      Comments: Left distal lower leg with hyperpigmentation and mix of healed ulcers and some desquamation   Neurological:      General: No focal deficit present.      Mental Status: He is alert and oriented to person, place, and time.   Psychiatric:         Mood and Affect: Mood normal.         Lab Results: I have personally reviewed pertinent reports.    Imaging: I have personally reviewed pertinent reports.    EKG, Pathology, and Other Studies: I have personally reviewed pertinent reports.      Code Status: No Order  Advance Directive and Living Will:      Power of :    POLST:      Counseling / Coordination of Care  None

## 2024-07-23 NOTE — ANESTHESIA POSTPROCEDURE EVALUATION
Post-Op Assessment Note    CV Status:  Stable    Pain management: adequate       Mental Status:  Alert and awake   Hydration Status:  Euvolemic   PONV Controlled:  Controlled   Airway Patency:  Patent     Post Op Vitals Reviewed: Yes    No anethesia notable event occurred.    Staff: Anesthesiologist               BP   181/88   Temp   98.3   Pulse  69   Resp   12   SpO2   100

## 2024-07-23 NOTE — ANESTHESIA PREPROCEDURE EVALUATION
Procedure:  left lower extremity saphenous vein ablation, multiple stab phlebectomies (Left: Leg Upper)    HTN - lisinopril yesterday  GERD - well controlled  Seizure on keppra. Last seizure 2018. Took keppra this am  Smoker    Relevant Problems   CARDIO   (+) Essential hypertension      GI/HEPATIC   (+) GERD without esophagitis      MUSCULOSKELETAL   (+) Lumbar degenerative disc disease      NEURO/PSYCH   (+) Alcohol withdrawal seizure without complication (HCC)   (+) Chronic pain syndrome   (+) Seizures (HCC)      PULMONARY   (+) Smoking        Physical Exam    Airway       Dental    upper dentures and lower dentures    Cardiovascular  Cardiovascular exam normal    Pulmonary  Pulmonary exam normal     Other Findings        Anesthesia Plan  ASA Score- 2     Anesthesia Type- general with ASA Monitors.         Additional Monitors:     Airway Plan: LMA.    Comment: Risks/benefits and alternatives discussed with patient including possible PONV, sore throat, damage to teeth/lips/gums/esophagus, and possibility of rare anesthetic and surgical emergencies including but not limited to heart attack, stroke, and/or death. All questions were answered.  .       Plan Factors-    Chart reviewed.   Existing labs reviewed. Patient summary reviewed.    Patient is a current smoker.              Induction- intravenous.    Postoperative Plan- Plan for postoperative opioid use.         Informed Consent- Anesthetic plan and risks discussed with patient and spouse.

## 2024-07-23 NOTE — DISCHARGE INSTR - AVS FIRST PAGE
DISCHARGE INSTRUCTIONS  VARICOSE VEIN SURGERY    ACTIVITY:  On the day of your operation, “take it easy”. You can take short walks around the house. When sitting, the leg should be elevated. The preferred position is to have the leg at or above the level of the heart. Starting on the first day after surgery, light walking is encouraged as tolerated. After your ultrasound test, you can resume your normal activity, but no heavy lifting (do not lift more than 15 pounds) or strenuous exercise for 2 weeks.   You should not drive a car until your bandages are removed and you are off all narcotic pain medication.  You may ride in a car.      DIET: Resume your normal diet.  Good nutrition is important for healing of your incision.    DRESSINGS/SURGICAL SITE:  When released from the hospital, you should have a compression bandage in place on the operated leg.  This bandage should feel snug, but not too tight.  If the bandage becomes blood soaked or painfully tight, elevate your leg and call the office ([phone])  You may have surgical glue on your incision sites.   There are stitches present under the skin which will absorb on their own.   The glue is used to cover the incision, assist in closure, and prevent contamination. This adhesive will darken and peel away on its own within one to two weeks. Do not pick at it.    You should shower daily.  Wash incisions daily with soap and water, but do not rub or scrub the incisions; rinse thoroughly and pat dry.      If the operated leg becomes increasingly painful or swollen, or if there is increasing redness or pain around your incision, contact our office.  Some bruising of the skin is common after varicose vein surgery.  This can be lessened by elevation of the leg. Many patients will notice some numbness of the shin, ankle, calf, or the top of the foot. This usually improves with time but may be persistent.  After surgery you can expect bruising, swelling and hard knots  on your leg.  As your body heals the bruising will fade and the swelling and knots will subside.  Apply sunscreen with SPF 30 to incisions while sun bathing for up to one year after surgery to reduce the chances of your incisions darkening.    FOLLOW UP STUDIES:  Your first post-operative appointment will be 2-3 days after your surgery. At this appointment your bandages will be removed, and you will be seen by a Vascular Surgeon, Nurse Practitioner, or Physician Assistant. An appointment for a follow up Doppler ultrasound study will be scheduled on the same day as your follow up appointment.     FOLLOW UP APPOINTMENTS:  Making and keeping follow up appointments and ultrasound tests are important to your recovery.  If you have difficulty making it to or keeping your follow up appointments, call the office.    If you have increased pain, fever >101.5, increased drainage, redness or a bad smell at your surgery site, new coldness/numbness of your arm or leg, please call us immediately and GO directly to the ER.    PLEASE CALL THE OFFICE IF YOU HAVE ANY QUESTIONS  [phone]  [phone] [address], [address], [place] [address]  [address] [place] [address]  [address] [facility], [address], [place] [address]  [address] [facility], [place] [address]  [address] [facility], [address], [place] [address]  [address] [facility], [address] [address]  [address] [place] [address]  [address] [facility], 1st Floor, [address], [place] [address]  [address] [facility] [address], [place] [address]  [address] [facility], [address], [place] [address]

## 2024-07-23 NOTE — OP NOTE
OPERATIVE REPORT  PATIENT NAME: Kolton Gaspar    :  1980  MRN: [id number]  Pt Location: [facility] OR ROOM 03    SURGERY DATE: 2024    Surgeons and Role:     * Tres Braun MD - Primary     * Steph Mart PA-C - Assisting    Preop Diagnosis:  Ulcer of extremity due to chronic venous insufficiency  (HCC) [L98.499, I87.2]    Post-Op Diagnosis Codes:     * Ulcer of extremity due to chronic venous insufficiency  (HCC) [L98.499, I87.2]    Procedure(s):  Left - left lower extremity saphenous vein ablation. multiple stab phlebectomies    Specimen(s):  * No specimens in log *    Estimated Blood Loss:   25 mL    Drains:  * No LDAs found *    Anesthesia Type:   General    Operative Indications:  Ulcer of extremity due to chronic venous insufficiency  (HCC) [L98.499, I87.2]    Operative Findings:  GSV ablation suing venaseal  SSV does not have any communicating branches to the GSV  11 stab phlebectomies      Complications:   None    Procedure and Technique:    After informed consent was obtained, the patient was brought to the operating room and placed in the supine position. Perioperative IV antibiotics were given.  He was given anesthesia and an LMA was placed.  He was then prepped and draped in the usual sterile fashion exposing the left leg.  A timeout was performed.     Duplex ultrasound was used to map out the insufficient left great saphenous vein, and access was determined and marked on the   overlying skin. The depth and diameter of the vein to be treated was documented.     Using ultrasound guidance, access was gained of the GSV at the ankle using a micro needle followed by a micro sheath. The 0.035 guidewire from the Venaseal kit was then introduced and positioned at the saphenofemoral junction using ultrasound guidance. The 80 cm 7 Fr introducer sheath/dilator was positioned 5cm from the saphenofemoral junction. The guidewire and dilator were removed, and the remaining sheath was flushed  "with sterile saline, with the syringe remaining in place prior to the next steps.   The cyanoacrylate adhesive was precisely primed into the 5 F delivery catheter and this catheter/syringe combination was attached within the dispenser gun. This \"assembly\" was introduced through the 7 F sheath and positioned 5 cm caudal of the saphenofemoral junction under ultrasound guidance. The steps from the IFU were followed for dispensing amounts, locations and compression times, 2 aliquots proximally with 3 minutes of compression, and 1 aliquot every 3cm distally with 30 sec of compression along the course of the vessel. Following the last injection and compression sequence, the catheter was disconnected from the sheath, pulled into the sheath and then both were removed together to avoid acrylic deposit into the soft tissue. Hemostasis was achieved with manual compression and an adhesive bandage was applied to the incision. Ultrasound confirmed complete coaptation and closure of the treated segments of the GSV, and the absence of any DVT at the saphenofemoral junction.   Vein length treated was 55 cm.     Small saphenous vein was evaluated under ultrasound. The vein was small and does not have communicating branches with the GSV     I was present for the entire procedure.    We then turned our attention to the phlebectomy portion of the procedure.  Varicosities were marked prior to the procedure with the patient in a standing position.  Small stab incisions were made over the varicosities with an 11-blade and a vein hook and mosquito clamps were used to avulse these veins.  A total of 11 incisions were made. Hemostasis was achieved with compression at each puncture site. The access site and skin nicks were dressed with histoacryl glue and the leg was wrapped with gauze, kerlix, and ACE from toes to thigh and coban from toes to knee.  The patient was allowed to awaken and was extubated.  He was transferred to the PACU for " postoperative care.     Patient Disposition:  PACU         SIGNATURE: Tres Braun MD  DATE: July 23, 2024  TIME: 11:24 AM

## 2024-07-26 ENCOUNTER — OFFICE VISIT (OUTPATIENT)
Dept: VASCULAR SURGERY | Facility: CLINIC | Age: 44
End: 2024-07-26

## 2024-07-26 ENCOUNTER — HOSPITAL ENCOUNTER (OUTPATIENT)
Dept: NON INVASIVE DIAGNOSTICS | Facility: HOSPITAL | Age: 44
Discharge: HOME | End: 2024-07-26
Attending: SURGERY
Payer: COMMERCIAL

## 2024-07-26 VITALS
HEART RATE: 64 BPM | HEIGHT: 70 IN | SYSTOLIC BLOOD PRESSURE: 124 MMHG | BODY MASS INDEX: 28.77 KG/M2 | WEIGHT: 201 LBS | DIASTOLIC BLOOD PRESSURE: 66 MMHG

## 2024-07-26 DIAGNOSIS — I87.332 VENOUS HYPERTENSION, CHRONIC, WITH ULCER AND INFLAMMATION, LEFT (HCC): Primary | ICD-10-CM

## 2024-07-26 DIAGNOSIS — I87.332 VENOUS HYPERTENSION, CHRONIC, WITH ULCER AND INFLAMMATION, LEFT (HCC): ICD-10-CM

## 2024-07-26 DIAGNOSIS — L98.499 ULCER OF EXTREMITY DUE TO CHRONIC VENOUS INSUFFICIENCY  (HCC): ICD-10-CM

## 2024-07-26 DIAGNOSIS — I87.2 ULCER OF EXTREMITY DUE TO CHRONIC VENOUS INSUFFICIENCY  (HCC): ICD-10-CM

## 2024-07-26 PROCEDURE — 93971 EXTREMITY STUDY: CPT

## 2024-07-26 PROCEDURE — 99024 POSTOP FOLLOW-UP VISIT: CPT

## 2024-07-26 PROCEDURE — 93971 EXTREMITY STUDY: CPT | Performed by: SURGERY

## 2024-07-26 NOTE — LETTER
July 26, 2024     Patient: Kolton Gaspar  YOB: 1980  Date of Visit: 7/26/2024      To Whom it May Concern:    Kolton Gaspar is under my professional care. Kolton was seen in my office on 7/26/2024. Kolton may return to work with limitations . He should periodically elevate his legs for 10-15 minutes every 2 hours and should avoid heavy lifting for 2 weeks .    If you have any questions or concerns, please don't hesitate to call.         Sincerely,          Steph Mart PA-C        CC: No Recipients

## 2024-07-26 NOTE — ASSESSMENT & PLAN NOTE
43yo male with PMH of alcohol abuse, GERD, HTN, seizures, lumbar radiculopathy, CPS, tobacco use, LLE varicose veins and CVI with recurrent ulcerations s/p LLE GSV venaseal 7/23/24 by Dr. Braun. He presents for post op visit.     - Recommend continued conservative measures including, daily compression stockings as tolerated and frequent lower extremity elevation.   - Continue OTC tylenol or ibuprofen as needed for pain.  - Advance activity as tolerated  - Wash incision sites with soap and water. Pat dry. Allow surgical glue to fall off on its own, do not aggressively scrub or pull off.   - Post-procedure LEV duplex scheduled today. Will review once completed.   - Instructed patient to call the office in the interim with any questions, concerns, or new symptoms.  - Return to office with surgeon in 4-6 weeks post-procedure.

## 2024-07-26 NOTE — PROGRESS NOTES
Ambulatory Visit  Name: Kolton Gaspar      : 1980      MRN: [id number]  Encounter Provider: Steph Mart PA-C  Encounter Date: 2024   Encounter department: [facility] [place]    Assessment & Plan   1. Venous hypertension, chronic, with ulcer and inflammation, left (HCC)  Assessment & Plan:  43yo male with PMH of alcohol abuse, GERD, HTN, seizures, lumbar radiculopathy, CPS, tobacco use, LLE varicose veins and CVI with recurrent ulcerations s/p LLE GSV venaseal 24 by Dr. Braun. He presents for post op visit.   -POD#3: Reports pain in the medial thigh extending up into the groin since surgery with mild erythema. Lower leg incisions c/d/I with surgical glue. No signs of infection or bleeding.   - Recommend continued conservative measures including, daily compression stockings as tolerated and frequent lower extremity elevation.   - Continue OTC tylenol or ibuprofen as needed for pain.  - Advance activity as tolerated  - Wash incision sites with soap and water. Pat dry. Allow surgical glue to fall off on its own, do not aggressively scrub or pull off.   - Post-procedure LEV duplex scheduled today. Will review once completed.   - Instructed patient to call the office in the interim with any questions, concerns, or new symptoms.  - Return to office with Dr. Braun in 4-6 weeks post-procedure.         History of Present Illness     Kolton Gaspar is a 44 y.o. male who presents for post op visit. He denies any acute complaints. He denies fever or chills. He is not using any prescription pain medication. He reports cramping pain in the medial thigh that has been progressing more proximal. He reports the pain feels like a muscle cramp and first started in the lower thigh but has been moving toward the groin. He denies SOB or CP. He denies incision concerns including erythema, bleeding, drainage, swelling.         Review of Systems   Constitutional: Negative.    HENT:  "Negative.     Eyes: Negative.    Respiratory: Negative.     Cardiovascular: Negative.    Gastrointestinal: Negative.    Endocrine: Negative.    Genitourinary: Negative.    Musculoskeletal: Negative.    Skin: Negative.    Allergic/Immunologic: Negative.    Neurological: Negative.    Hematological: Negative.    Psychiatric/Behavioral: Negative.     I have personally reviewed and made appropriate changes to the ROS that was input by the medical assistant.       Objective       Vitals:    07/26/24 1317   BP: 124/66   BP Location: Right arm   Patient Position: Sitting   Cuff Size: Standard   Pulse: 64   Weight: 91.2 kg (201 lb)   Height: 5' 10\" (1.778 m)       Patient Active Problem List   Diagnosis    Alcohol withdrawal seizure without complication (HCC)    Essential hypertension    GERD without esophagitis    Seizures (HCC)    Alcohol abuse    Venous hypertension, chronic, with ulcer and inflammation, left (HCC)    Infection of wound due to methicillin resistant Staphylococcus aureus (MRSA)    Lumbar degenerative disc disease    Lumbar radiculopathy    Chronic pain syndrome    Smoking    Cigarette nicotine dependence without complication       Past Surgical History:   Procedure Laterality Date    BACK SURGERY      CARPAL TUNNEL RELEASE Bilateral     CYST REMOVAL      ENDOVASCULAR LASER THERAPY (EVLT) Left 7/23/2024    Procedure: left lower extremity saphenous vein ablation, multiple stab phlebectomies;  Surgeon: Tres Braun MD;  Location: [facility];  Service: Vascular    PILONIDAL CYST EXCISION      TX LAMNOTMY INCL W/DCMPRSN NRV ROOT 1 INTRSPC LUMBR N/A 06/22/2023    Procedure: Lumbar laminectomy with discectomy left L4/5;  Surgeon: Craig Robert Goldberg, MD;  Location:  MAIN OR;  Service: Neurosurgery       Family History   Problem Relation Age of Onset    Stroke Father     Heart attack Father     Coronary artery disease Family         Less than 60 yrs of age    Diabetes Paternal Grandmother     Seizures " "Neg Hx        Social History     Socioeconomic History    Marital status: /Civil Union     Spouse name: Not on file    Number of children: Not on file    Years of education: Not on file    Highest education level: Not on file   Occupational History    Not on file   Tobacco Use    Smoking status: Every Day     Current packs/day: 1.00     Types: Cigarettes    Smokeless tobacco: Never   Vaping Use    Vaping status: Never Used   Substance and Sexual Activity    Alcohol use: Yes     Comment: 2 x month    Drug use: No    Sexual activity: Yes     Partners: Female   Other Topics Concern    Not on file   Social History Narrative    ** Merged History Encounter **          Social Determinants of Health     Financial Resource Strain: Not on file   Food Insecurity: Not on file   Transportation Needs: Not on file   Physical Activity: Not on file   Stress: Not on file   Social Connections: Not on file   Intimate Partner Violence: Not on file   Housing Stability: Not on file       Allergies   Allergen Reactions    Nonoxynol 9 Other (See Comments)     Childhood reaction - pt unsure of reaction         Current Outpatient Medications:     levETIRAcetam (KEPPRA) 750 mg tablet, Take 1 tablet (750 mg total) by mouth 2 (two) times a day, Disp: 180 tablet, Rfl: 3    lisinopril (ZESTRIL) 20 mg tablet, Take 1 tablet (20 mg total) by mouth daily, Disp: 30 tablet, Rfl: 3    nicotine (NICODERM CQ) 21 mg/24 hr TD 24 hr patch, Place 1 patch on the skin over 24 hours every 24 hours (Patient not taking: Reported on 7/10/2024), Disp: 28 patch, Rfl: 1    traMADol (Ultram) 50 mg tablet, Take 1 tablet (50 mg total) by mouth every 6 (six) hours as needed for moderate pain for up to 5 doses (Patient not taking: Reported on 7/26/2024), Disp: 5 tablet, Rfl: 0      /66 (BP Location: Right arm, Patient Position: Sitting, Cuff Size: Standard)   Pulse 64   Ht 5' 10\" (1.778 m)   Wt 91.2 kg (201 lb)   BMI 28.84 kg/m²     Physical Exam  Vitals " and nursing note reviewed.   Constitutional:       General: He is not in acute distress.     Appearance: Normal appearance. He is well-developed.   HENT:      Head: Normocephalic and atraumatic.   Cardiovascular:      Rate and Rhythm: Normal rate and regular rhythm.      Pulses:           Dorsalis pedis pulses are 2+ on the left side.        Posterior tibial pulses are 2+ on the left side.      Heart sounds: No murmur heard.  Pulmonary:      Effort: Pulmonary effort is normal. No respiratory distress.      Breath sounds: Normal breath sounds.   Musculoskeletal:         General: Tenderness present. No swelling.      Cervical back: Neck supple.      Comments: Tenderness in left medial thigh extending into groin with erythema   Skin:     General: Skin is warm and dry.      Comments: Lipodermatosclerosis noted in left distal ankle and lower leg without open ulcerations   Neurological:      Mental Status: He is alert.                     Steph Mart PA-C  [facility]  [phone]

## 2024-08-06 DIAGNOSIS — R56.9 SEIZURES (HCC): ICD-10-CM

## 2024-08-06 DIAGNOSIS — I10 ESSENTIAL HYPERTENSION: ICD-10-CM

## 2024-08-06 RX ORDER — LEVETIRACETAM 750 MG/1
750 TABLET ORAL 2 TIMES DAILY
Qty: 180 TABLET | Refills: 0 | Status: SHIPPED | OUTPATIENT
Start: 2024-08-06

## 2024-08-06 RX ORDER — LISINOPRIL 20 MG/1
20 TABLET ORAL DAILY
Qty: 30 TABLET | Refills: 3 | Status: SHIPPED | OUTPATIENT
Start: 2024-08-06 | End: 2024-12-04

## 2024-08-12 ENCOUNTER — TELEPHONE (OUTPATIENT)
Age: 44
End: 2024-08-12

## 2024-08-12 NOTE — TELEPHONE ENCOUNTER
Patient status post varicose vein surgery 7/23/24 MJQ. He is calling to state that he will be faxing over [employer] paperwork to be filled out. He may be calling back with the fax# for us to fax back or he may include it with the fax he sends.

## 2024-08-13 NOTE — TELEPHONE ENCOUNTER
Caller: Kolton Gaspar    Doctor: Dr. Braun    Reason for call: Pt tried both fax numbers and is still unsuccessful getting the paperwork sent through. Reached out to the office staff who provided an additional fax number to try ([phone]). If the paperwork does not go through, pt was advised to call back. At that time please transfer call to the office and ask for Rosita.    Call back#: [phone]

## 2024-08-13 NOTE — TELEPHONE ENCOUNTER
Caller: Phillip    Doctor: Aparna    Reason for call: Pt calling to check fax number as they have been having trouble sending to the [phone] number, advised to resend for and try fax number of [phone] as well as previous fax number. Pt advised return fax number will be included on the form sent to team. Please keep and eye out for forms and call pt when received and complete    Call back#: [phone]

## 2024-08-15 NOTE — TELEPHONE ENCOUNTER
Received paperwork @ [place] office 8/14/24, sent to other MA to get signed by Dr. Braun today 8/15/24.

## 2024-08-19 NOTE — TELEPHONE ENCOUNTER
Caller: Kolton Gaspar    Doctor/Office: Dr. Braun - Vascular Surgery    Call regarding :  [employer] paperwork status update.      Call was transferred to: Bea - Vascular Surgery Practice Administrator

## 2024-08-19 NOTE — TELEPHONE ENCOUNTER
Caller: Kolton Gaspar    Doctor: Aparna    Reason for call: Patient was just speaking with Olga when call was dropped.  Gave patient information she had found in regard to his [employer] paperwork. Please call patient with status update.    Call back#: [phone]

## 2024-08-19 NOTE — TELEPHONE ENCOUNTER
Caller: Kolton Gaspar    Doctor: Dr. Braun    Reason for call: Checking on the status of his [employer] paperwork. Unable to find out any additional information for the pt in regards to a progress update. Office staff stated they would reach out to the MA's with a progress status and call the pt back with those updates. Relaying information to the pt when the call disconnected. Please contact the pt with an update.    Call back#: [phone]

## 2024-08-19 NOTE — TELEPHONE ENCOUNTER
Caller: Kolton Gaspar    Doctor: Dr. Braun    Reason for call: Pt calling for a progress update on paperwork. Informed the pt of what occurred this morning and that we have not heard anything back since. Please have this paperwork worked on and faxed to pt's  at [phone], ASAP. Thank you.    Call back#: [phone]

## 2024-08-20 NOTE — TELEPHONE ENCOUNTER
Fax is still attempting to send, might be bad number. However patient provided me with email [email or web address] - emailed paperwork. Called autumn at [phone] advised her of the same, asked her to give me a call on my direct line to confirm she received it. Spoke with patient and advised him of the same.

## 2024-08-20 NOTE — TELEPHONE ENCOUNTER
Spoke with patient yesterday at 5pm, advised him that paperwork is signed and office staff will send over paperwork this morning. Patient Calling this morning has not received paperwork. Advised patient I will request paperwork and fax it over. Fax number is [phone]. Kathy Whiteside from [facility] number is [phone].

## 2024-08-20 NOTE — TELEPHONE ENCOUNTER
Caller: Kolton Gaspar    Doctor: Dr. Braun    Reason for call: Checking on the aflac paperwork. Pt still has not received a call with additional information and his  has not received our fax either.    Call was transferred to: Bea  Vascular Surgery Practice Administrator

## 2024-08-23 NOTE — TELEPHONE ENCOUNTER
"Received call from Kathy from [facility], advised the \"first Date of Disability\" section is not filled in. Emailed provider who is out of the office today,  waiting for response. Once received, form will be filled out and emailed to kathy.   "

## 2024-09-16 DIAGNOSIS — R56.9 SEIZURES (HCC): ICD-10-CM

## 2024-09-16 DIAGNOSIS — I10 ESSENTIAL HYPERTENSION: ICD-10-CM

## 2024-09-17 RX ORDER — LEVETIRACETAM 750 MG/1
750 TABLET ORAL 2 TIMES DAILY
Qty: 60 TABLET | Refills: 0 | Status: SHIPPED | OUTPATIENT
Start: 2024-09-17

## 2024-09-17 RX ORDER — LISINOPRIL 20 MG/1
20 TABLET ORAL DAILY
Qty: 30 TABLET | Refills: 0 | Status: SHIPPED | OUTPATIENT
Start: 2024-09-17 | End: 2025-01-15

## 2024-09-23 ENCOUNTER — OFFICE VISIT (OUTPATIENT)
Dept: VASCULAR SURGERY | Facility: HOSPITAL | Age: 44
End: 2024-09-23

## 2024-09-23 VITALS
BODY MASS INDEX: 27.49 KG/M2 | DIASTOLIC BLOOD PRESSURE: 78 MMHG | WEIGHT: 192 LBS | SYSTOLIC BLOOD PRESSURE: 118 MMHG | HEART RATE: 77 BPM | HEIGHT: 70 IN

## 2024-09-23 DIAGNOSIS — I87.332 VENOUS HYPERTENSION, CHRONIC, WITH ULCER AND INFLAMMATION, LEFT (HCC): Primary | ICD-10-CM

## 2024-09-23 PROCEDURE — 99024 POSTOP FOLLOW-UP VISIT: CPT | Performed by: SURGERY

## 2024-09-23 NOTE — ASSESSMENT & PLAN NOTE
44-year-old male status post left lower extremity VenaSeal ablation of the GSV with 11 stab phlebectomies present back to the office for follow-up.  Patient is happy with his results.  He has had improvement of the skin and healing ulcers.  He is also noticed decreased swelling of the left leg.  He is back to his normal activities.    At this point patient can follow-up in the office as needed.  Did recommend he continue with compression daily for any residual swelling

## 2024-09-23 NOTE — PROGRESS NOTES
Ambulatory Visit  Name: Kolton Gaspar      : 1980      MRN: [id number]  Encounter Provider: Tres Braun MD  Encounter Date: 2024   Enc[facility] department: [facility] [place]    Assessment & Plan  Venous hypertension, chronic, with ulcer and inflammation, left (HCC)  44-year-old male status post left lower extremity VenaSeal ablation of the GSV with 11 stab phlebectomies present back to the office for follow-up.  Patient is happy with his results.  He has had improvement of the skin and healing ulcers.  He is also noticed decreased swelling of the left leg.  He is back to his normal activities.    At this point patient can follow-up in the office as needed.  Did recommend he continue with compression daily for any residual swelling           History of Present Illness     Kolton Gaspar is a 44 y.o. male      Patient presents LLE SV ablation & stab phlebectomies done 24.     History obtained from : patient  Review of Systems   Constitutional: Negative.    HENT: Negative.     Eyes: Negative.    Respiratory: Negative.     Cardiovascular: Negative.    Gastrointestinal: Negative.    Endocrine: Negative.    Genitourinary: Negative.    Musculoskeletal: Negative.    Skin: Negative.    Allergic/Immunologic: Negative.    Neurological: Negative.    Hematological: Negative.    Psychiatric/Behavioral: Negative.       Medical History Reviewed by provider this encounter:           Objective     There were no vitals taken for this visit.    Physical Exam  Vitals and nursing note reviewed.   Constitutional:       Appearance: He is well-developed.   HENT:      Head: Normocephalic and atraumatic.   Eyes:      Conjunctiva/sclera: Conjunctivae normal.   Cardiovascular:      Rate and Rhythm: Normal rate and regular rhythm.   Pulmonary:      Effort: Pulmonary effort is normal.      Breath sounds: Normal breath sounds.   Abdominal:      Palpations: Abdomen is soft.      Tenderness: There is  no abdominal tenderness.   Musculoskeletal:      Cervical back: Neck supple.   Skin:     Capillary Refill: Capillary refill takes less than 2 seconds.      Comments: Improved LLE skin and healing ulcers   Neurological:      General: No focal deficit present.      Mental Status: He is alert and oriented to person, place, and time.   Psychiatric:         Mood and Affect: Mood normal.       Administrative Statements   I have spent a total time of 30 minutes in caring for this patient on the day of the visit/encounter including Diagnostic results, Prognosis, Risks and benefits of tx options, Instructions for management, Patient and family education, Importance of tx compliance, Risk factor reductions, Impressions, Counseling / Coordination of care, Documenting in the medical record, Reviewing / ordering tests, medicine, procedures  , and Obtaining or reviewing history  .

## 2024-10-17 DIAGNOSIS — R56.9 SEIZURES (HCC): ICD-10-CM

## 2024-10-17 RX ORDER — LEVETIRACETAM 750 MG/1
750 TABLET ORAL 2 TIMES DAILY
Qty: 60 TABLET | Refills: 0 | Status: SHIPPED | OUTPATIENT
Start: 2024-10-17

## 2024-10-18 ENCOUNTER — OFFICE VISIT (OUTPATIENT)
Dept: FAMILY MEDICINE CLINIC | Facility: HOME HEALTHCARE | Age: 44
End: 2024-10-18
Payer: COMMERCIAL

## 2024-10-18 ENCOUNTER — APPOINTMENT (OUTPATIENT)
Dept: LAB | Facility: HOSPITAL | Age: 44
End: 2024-10-18
Payer: COMMERCIAL

## 2024-10-18 VITALS
HEART RATE: 59 BPM | HEIGHT: 70 IN | TEMPERATURE: 98.2 F | WEIGHT: 195.6 LBS | BODY MASS INDEX: 28 KG/M2 | DIASTOLIC BLOOD PRESSURE: 78 MMHG | OXYGEN SATURATION: 97 % | RESPIRATION RATE: 18 BRPM | SYSTOLIC BLOOD PRESSURE: 104 MMHG

## 2024-10-18 DIAGNOSIS — Z11.3 SCREEN FOR SEXUALLY TRANSMITTED DISEASES: ICD-10-CM

## 2024-10-18 DIAGNOSIS — Z13.6 SCREENING FOR CARDIOVASCULAR CONDITION: ICD-10-CM

## 2024-10-18 DIAGNOSIS — I10 ESSENTIAL HYPERTENSION: ICD-10-CM

## 2024-10-18 DIAGNOSIS — Z00.00 ANNUAL PHYSICAL EXAM: Primary | ICD-10-CM

## 2024-10-18 DIAGNOSIS — Z12.11 SCREEN FOR COLON CANCER: ICD-10-CM

## 2024-10-18 DIAGNOSIS — Z23 ENCOUNTER FOR IMMUNIZATION: ICD-10-CM

## 2024-10-18 DIAGNOSIS — Z12.5 SCREENING FOR PROSTATE CANCER: ICD-10-CM

## 2024-10-18 DIAGNOSIS — K13.21 LEUKOPLAKIA OF TONGUE: ICD-10-CM

## 2024-10-18 PROBLEM — F10.10 ALCOHOL ABUSE: Status: RESOLVED | Noted: 2019-08-01 | Resolved: 2024-10-18

## 2024-10-18 PROBLEM — A49.02 INFECTION OF WOUND DUE TO METHICILLIN RESISTANT STAPHYLOCOCCUS AUREUS (MRSA): Status: RESOLVED | Noted: 2021-04-23 | Resolved: 2024-10-18

## 2024-10-18 PROBLEM — M54.16 LUMBAR RADICULOPATHY: Status: RESOLVED | Noted: 2023-04-27 | Resolved: 2024-10-18

## 2024-10-18 LAB
CHOLEST SERPL-MCNC: 200 MG/DL
EST. AVERAGE GLUCOSE BLD GHB EST-MCNC: 111 MG/DL
HBA1C MFR BLD: 5.5 %
HBV SURFACE AG SER QL: NORMAL
HCV AB SER QL: NORMAL
HDLC SERPL-MCNC: 52 MG/DL
LDLC SERPL CALC-MCNC: 129 MG/DL (ref 0–100)
NONHDLC SERPL-MCNC: 148 MG/DL
TRIGL SERPL-MCNC: 97 MG/DL
TSH SERPL DL<=0.05 MIU/L-ACNC: 1.04 UIU/ML (ref 0.45–4.5)

## 2024-10-18 PROCEDURE — 80061 LIPID PANEL: CPT

## 2024-10-18 PROCEDURE — 86780 TREPONEMA PALLIDUM: CPT

## 2024-10-18 PROCEDURE — 87389 HIV-1 AG W/HIV-1&-2 AB AG IA: CPT

## 2024-10-18 PROCEDURE — 90632 HEPA VACCINE ADULT IM: CPT

## 2024-10-18 PROCEDURE — 86803 HEPATITIS C AB TEST: CPT

## 2024-10-18 PROCEDURE — 87491 CHLMYD TRACH DNA AMP PROBE: CPT

## 2024-10-18 PROCEDURE — 87591 N.GONORRHOEAE DNA AMP PROB: CPT

## 2024-10-18 PROCEDURE — 87340 HEPATITIS B SURFACE AG IA: CPT

## 2024-10-18 PROCEDURE — 84443 ASSAY THYROID STIM HORMONE: CPT

## 2024-10-18 PROCEDURE — G0103 PSA SCREENING: HCPCS

## 2024-10-18 PROCEDURE — 90471 IMMUNIZATION ADMIN: CPT | Performed by: FAMILY MEDICINE

## 2024-10-18 PROCEDURE — 90472 IMMUNIZATION ADMIN EACH ADD: CPT | Performed by: FAMILY MEDICINE

## 2024-10-18 PROCEDURE — 83036 HEMOGLOBIN GLYCOSYLATED A1C: CPT

## 2024-10-18 PROCEDURE — 36415 COLL VENOUS BLD VENIPUNCTURE: CPT

## 2024-10-18 PROCEDURE — 99396 PREV VISIT EST AGE 40-64: CPT | Performed by: STUDENT IN AN ORGANIZED HEALTH CARE EDUCATION/TRAINING PROGRAM

## 2024-10-18 PROCEDURE — 90677 PCV20 VACCINE IM: CPT

## 2024-10-18 RX ORDER — LISINOPRIL 20 MG/1
20 TABLET ORAL DAILY
Qty: 90 TABLET | Refills: 1 | Status: SHIPPED | OUTPATIENT
Start: 2024-10-18 | End: 2025-04-16

## 2024-10-18 RX ORDER — LISINOPRIL 20 MG/1
20 TABLET ORAL DAILY
Qty: 30 TABLET | Refills: 3 | Status: CANCELLED | OUTPATIENT
Start: 2024-10-18 | End: 2025-02-15

## 2024-10-18 NOTE — PROGRESS NOTES
Adult Annual Physical  Name: Kolton Gaspar      : 1980      MRN: [id number]  Encounter Provider: Cintia Butts MD  Encounter Date: 10/18/2024   Encounter department: [facility]    Assessment & Plan  Annual physical exam  Revealed an updated past medical history, surgical history, medication, allergies, social history.  Not ready to discuss smoking cessation.  Vital signs are stable.  Refilled lisinopril.  Agreed to go to colonoscopy.  Received vaccines.  Will obtain routine labs, including STD screening test.    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  This was seen by his dentist several months ago.  Since he is smoking for many years, this is concerning for precancerous changes until proven otherwise.  Patient was referred to ENT for a possible biopsy.       Essential hypertension  Refilled  Orders:    lisinopril (ZESTRIL) 20 mg tablet; Take 1 tablet (20 mg total) by mouth daily    Leukoplakia of tongue  There was an incidental finding of leukoplakia underneath the right tongue.  This was seen by his dentist several months ago.  Since he is smoking for many years, this is concerning for precancerous changes until proven otherwise.  Patient was referred to ENT for a possible biopsy.  Orders:    Ambulatory Referral to Otolaryngology; Future    Screen for colon cancer    Orders:    Ambulatory Referral to Gastroenterology; Future    Screen for sexually transmitted diseases    Orders:    Hepatitis C antibody; Future    Chlamydia/GC amplified DNA by PCR; Future    RPR-Syphilis Screening (Total Syphilis IGG/IGM); Future    HIV 1/2 AG/AB w Reflex [facility] for 2 yr old and above; Future    Hepatitis B surface antigen; Future    Encounter for immunization    Orders:    Pneumococcal Conjugate Vaccine 20-valent (Pcv20)    Hepatitis A vaccine adult IM    influenza vaccine preservative-free 0.5 mL IM (Fluzone, Afluria, Fluarix,  Flulaval)    Screening for cardiovascular condition    Orders:    Lipid panel; Future    Hemoglobin A1C; Future    TSH, 3rd generation; Future    Screening for prostate cancer    Orders:    PSA, Total Screen; Future    Essential hypertension    Orders:    lisinopril (ZESTRIL) 20 mg tablet; Take 1 tablet (20 mg total) by mouth daily    Immunizations and preventive care screenings were discussed with patient today. Appropriate education was printed on patient's after visit summary.    Discussed risks and benefits of prostate cancer screening. We discussed the controversial history of PSA screening for prostate cancer in the [place] as well as the risk of over detection and over treatment of prostate cancer by way of PSA screening.  The patient understands that PSA blood testing is an imperfect way to screen for prostate cancer and that elevated PSA levels in the blood may also be caused by infection, inflammation, prostatic trauma or manipulation, urological procedures, or by benign prostatic enlargement.    The role of the digital rectal examination in prostate cancer screening was also discussed and I discussed with him that there is large interobserver variability in the findings of digital rectal examination.    Counseling:  Alcohol/drug use: discussed moderation in alcohol intake, the recommendations for healthy alcohol use, and avoidance of illicit drug use.  Dental Health: discussed importance of regular tooth brushing, flossing, and dental visits.  Sexual health: discussed sexually transmitted diseases, partner selection, use of condoms, avoidance of unintended pregnancy, and contraceptive alternatives.         History of Present Illness     Adult Annual Physical:  Patient presents for annual physical. 44-year-old man history of hypertension, currently smoking, presents for annual physical exam.  He has stayed away from alcohol for the past 1 year.  Denies dizziness, weakness, side effect from  "lisinopril.  Does not chew tobacco..     Review of Systems      Objective     /78 (BP Location: Left arm, Patient Position: Sitting, Cuff Size: Large)   Pulse 59   Temp 98.2 °F (36.8 °C)   Resp 18   Ht 5' 10\" (1.778 m)   Wt 88.7 kg (195 lb 9.6 oz)   SpO2 97%   BMI 28.07 kg/m²     Physical Exam  Vitals reviewed.   Constitutional:       General: He is not in acute distress.     Appearance: He is well-developed.   HENT:      Head: Normocephalic and atraumatic.      Nose: Nose normal.      Mouth/Throat:      Lips: Pink.      Mouth: Mucous membranes are moist. No oral lesions.      Dentition: Dental caries present. No gingival swelling or dental abscesses.      Tongue: Lesions (Leukoplakia underneath the tongue, at right side.  Not able to be scraped off.) present.      Pharynx: Oropharynx is clear.     Eyes:      Extraocular Movements: Extraocular movements intact.      Conjunctiva/sclera: Conjunctivae normal.   Cardiovascular:      Rate and Rhythm: Normal rate and regular rhythm.      Heart sounds: Normal heart sounds. No murmur heard.  Pulmonary:      Effort: Pulmonary effort is normal. No respiratory distress.      Breath sounds: Normal breath sounds. No stridor. No wheezing, rhonchi or rales.   Musculoskeletal:      Cervical back: Neck supple.   Skin:     General: Skin is warm and dry.   Neurological:      General: No focal deficit present.      Mental Status: He is alert. Mental status is at baseline.   Psychiatric:         Mood and Affect: Mood normal.         Behavior: Behavior normal.         "

## 2024-10-18 NOTE — PATIENT INSTRUCTIONS
"Patient Education     Routine physical for adults   The Basics   Written by the doctors and editors at [facility]   What is a physical? -- A physical is a routine visit, or \"check-up,\" with your doctor. You might also hear it called a \"wellness visit\" or \"preventive visit.\"  During each visit, the doctor will:   Ask about your physical and mental health   Ask about your habits, behaviors, and lifestyle   Do an exam   Give you vaccines if needed   Talk to you about any medicines you take   Give advice about your health   Answer your questions  Getting regular check-ups is an important part of taking care of your health. It can help your doctor find and treat any problems you have. But it's also important for preventing health problems.  A routine physical is different from a \"sick visit.\" A sick visit is when you see a doctor because of a health concern or problem. Since physicals are scheduled ahead of time, you can think about what you want to ask the doctor.  How often should I get a physical? -- It depends on your age and health. In general, for people age 21 years and older:   If you are younger than 50 years, you might be able to get a physical every 3 years.   If you are 50 years or older, your doctor might recommend a physical every year.  If you have an ongoing health condition, like diabetes or high blood pressure, your doctor will probably want to see you more often.  What happens during a physical? -- In general, each visit will include:   Physical exam - The doctor or nurse will check your height, weight, heart rate, and blood pressure. They will also look at your eyes and ears. They will ask about how you are feeling and whether you have any symptoms that bother you.   Medicines - It's a good idea to bring a list of all the medicines you take to each doctor visit. Your doctor will talk to you about your medicines and answer any questions. Tell them if you are having any side effects that bother you. You " "should also tell them if you are having trouble paying for any of your medicines.   Habits and behaviors - This includes:   Your diet   Your exercise habits   Whether you smoke, drink alcohol, or use drugs   Whether you are sexually active   Whether you feel safe at home  Your doctor will talk to you about things you can do to improve your health and lower your risk of health problems. They will also offer help and support. For example, if you want to quit smoking, they can give you advice and might prescribe medicines. If you want to improve your diet or get more physical activity, they can help you with this, too.   Lab tests, if needed - The tests you get will depend on your age and situation. For example, your doctor might want to check your:   Cholesterol   Blood sugar   Iron level   Vaccines - The recommended vaccines will depend on your age, health, and what vaccines you already had. Vaccines are very important because they can prevent certain serious or deadly infections.   Discussion of screening - \"Screening\" means checking for diseases or other health problems before they cause symptoms. Your doctor can recommend screening based on your age, risk, and preferences. This might include tests to check for:   Cancer, such as breast, prostate, cervical, ovarian, colorectal, prostate, lung, or skin cancer   Sexually transmitted infections, such as chlamydia and gonorrhea   Mental health conditions like depression and anxiety  Your doctor will talk to you about the different types of screening tests. They can help you decide which screenings to have. They can also explain what the results might mean.   Answering questions - The physical is a good time to ask the doctor or nurse questions about your health. If needed, they can refer you to other doctors or specialists, too.  Adults older than 65 years often need other care, too. As you get older, your doctor will talk to you about:   How to prevent falling at " home   Hearing or vision tests   Memory testing   How to take your medicines safely   Making sure that you have the help and support you need at home  All topics are updated as new evidence becomes available and our peer review process is complete.  This topic retrieved from [employer] on: May 02, 2024.  Topic 997827 Version 1.0  Release: 32.4.3 - C32.122  © 2024 UpToDate, Inc. and/or its affiliates. All rights reserved.  Consumer Information Use and Disclaimer   Disclaimer: This generalized information is a limited summary of diagnosis, treatment, and/or medication information. It is not meant to be comprehensive and should be used as a tool to help the user understand and/or assess potential diagnostic and treatment options. It does NOT include all information about conditions, treatments, medications, side effects, or risks that may apply to a specific patient. It is not intended to be medical advice or a substitute for the medical advice, diagnosis, or treatment of a health care provider based on the health care provider's examination and assessment of a patient's specific and unique circumstances. Patients must speak with a health care provider for complete information about their health, medical questions, and treatment options, including any risks or benefits regarding use of medications. This information does not endorse any treatments or medications as safe, effective, or approved for treating a specific patient. UpToDate, Inc. and its affiliates disclaim any warranty or liability relating to this information or the use thereof.The use of this information is governed by the Terms of Use, available at https://www.[employer].com/en/know/clinical-effectiveness-terms. 2024© UpToDate, Inc. and its affiliates and/or licensors. All rights reserved.  Copyright   © 2024 UpToDate, Inc. and/or its affiliates. All rights reserved.

## 2024-10-18 NOTE — ASSESSMENT & PLAN NOTE
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  This was seen by his dentist several months ago.  Since he is smoking for many years, this is concerning for precancerous changes until proven otherwise.  Patient was referred to ENT for a possible biopsy.  Orders:    Ambulatory Referral to Otolaryngology; Future

## 2024-10-18 NOTE — ASSESSMENT & PLAN NOTE
Refilled  Orders:    lisinopril (ZESTRIL) 20 mg tablet; Take 1 tablet (20 mg total) by mouth daily

## 2024-10-19 LAB
HIV 1+2 AB+HIV1 P24 AG SERPL QL IA: NORMAL
HIV 2 AB SERPL QL IA: NORMAL
HIV1 AB SERPL QL IA: NORMAL
HIV1 P24 AG SERPL QL IA: NORMAL
PSA SERPL-MCNC: 2.15 NG/ML (ref 0–4)
TREPONEMA PALLIDUM IGG+IGM AB [PRESENCE] IN SERUM OR PLASMA BY IMMUNOASSAY: NORMAL

## 2024-10-20 LAB
C TRACH DNA SPEC QL NAA+PROBE: NEGATIVE
N GONORRHOEA DNA SPEC QL NAA+PROBE: NEGATIVE

## 2024-11-12 ENCOUNTER — OFFICE VISIT (OUTPATIENT)
Dept: FAMILY MEDICINE CLINIC | Facility: HOME HEALTHCARE | Age: 44
End: 2024-11-12
Payer: COMMERCIAL

## 2024-11-12 VITALS
OXYGEN SATURATION: 94 % | DIASTOLIC BLOOD PRESSURE: 70 MMHG | TEMPERATURE: 98.2 F | WEIGHT: 194.6 LBS | BODY MASS INDEX: 27.86 KG/M2 | HEIGHT: 70 IN | HEART RATE: 70 BPM | SYSTOLIC BLOOD PRESSURE: 110 MMHG | RESPIRATION RATE: 18 BRPM

## 2024-11-12 DIAGNOSIS — M54.50 LOW BACK PAIN AT MULTIPLE SITES: Primary | ICD-10-CM

## 2024-11-12 DIAGNOSIS — Z98.890 S/P LUMBAR LAMINECTOMY: ICD-10-CM

## 2024-11-12 DIAGNOSIS — Z98.890 S/P LUMBAR DISCECTOMY: ICD-10-CM

## 2024-11-12 DIAGNOSIS — R56.9 SEIZURES (HCC): ICD-10-CM

## 2024-11-12 DIAGNOSIS — I10 ESSENTIAL HYPERTENSION: ICD-10-CM

## 2024-11-12 PROCEDURE — 99213 OFFICE O/P EST LOW 20 MIN: CPT | Performed by: PHYSICIAN ASSISTANT

## 2024-11-12 RX ORDER — LISINOPRIL 20 MG/1
20 TABLET ORAL DAILY
Qty: 90 TABLET | Refills: 1 | Status: SHIPPED | OUTPATIENT
Start: 2024-11-12 | End: 2024-11-12

## 2024-11-12 RX ORDER — LEVETIRACETAM 750 MG/1
750 TABLET ORAL 2 TIMES DAILY
Qty: 180 TABLET | Refills: 1 | Status: SHIPPED | OUTPATIENT
Start: 2024-11-12 | End: 2024-11-12

## 2024-11-12 RX ORDER — LISINOPRIL 20 MG/1
20 TABLET ORAL DAILY
Qty: 90 TABLET | Refills: 1 | Status: SHIPPED | OUTPATIENT
Start: 2024-11-12 | End: 2025-05-11

## 2024-11-12 RX ORDER — CYCLOBENZAPRINE HCL 10 MG
10 TABLET ORAL 3 TIMES DAILY PRN
Qty: 30 TABLET | Refills: 0 | Status: SHIPPED | OUTPATIENT
Start: 2024-11-12

## 2024-11-12 RX ORDER — LEVETIRACETAM 750 MG/1
750 TABLET ORAL 2 TIMES DAILY
Qty: 180 TABLET | Refills: 1 | Status: SHIPPED | OUTPATIENT
Start: 2024-11-12

## 2024-11-12 NOTE — PROGRESS NOTES
Ambulatory Visit  Name: Kolton Gaspar      : 1980      MRN: [id number]  Encounter Provider: Freddy Vee PA-C  Encounter Date: 2024   Encounter department: [facility]    Assessment & Plan  Low back pain at multiple sites  Will check lumbar xray.  May continue ibuprofen PRN.  Will also treat with flexeril PRN.  Do not drive while taking this medication.  Will follow up with results.  Orders:    XR spine lumbar 2 or 3 views injury; Future    cyclobenzaprine (FLEXERIL) 10 mg tablet; Take 1 tablet (10 mg total) by mouth 3 (three) times a day as needed for muscle spasms    S/P lumbar discectomy    Orders:    XR spine lumbar 2 or 3 views injury; Future    S/P lumbar laminectomy    Orders:    XR spine lumbar 2 or 3 views injury; Future    Seizures (HCC)    Orders:    levETIRAcetam (KEPPRA) 750 mg tablet; Take 1 tablet (750 mg total) by mouth 2 (two) times a day    Essential hypertension    Orders:    lisinopril (ZESTRIL) 20 mg tablet; Take 1 tablet (20 mg total) by mouth daily      Depression Screening and Follow-up Plan: Patient was screened for depression during today's encounter. They screened negative with a PHQ-2 score of 0.    Tobacco Cessation Counseling: The patient is sincerely urged to quit consumption of tobacco. He is not ready to quit tobacco.       History of Present Illness     Kolton is a 44 year old male with history of HTN, GERD, seizures, chronic back pain, venous insufficiency, tobacco use, presenting with concern for back pain.    Patient is s/p Lumbar laminectomy with discectomy left L4/5 on 2023.  He reports feeling well until about 2 weeks ago when he was pushing a fireplace across the floor and experienced sharp lower back pain upon standing up.  He reports pain has been constant in his right lower back and radiating to his tailbone.  He has taken ibuprofen without relief.  Has also been using icy hot patches and states that he developed a  "chemical burn from the patches.  Denies bowel or bladder incontinence.  Denies numbness or tingling in his legs.        History obtained from : patient  Review of Systems   Constitutional:  Negative for chills, diaphoresis and fever.   Respiratory:  Negative for cough, chest tightness, shortness of breath and wheezing.    Cardiovascular:  Negative for chest pain and palpitations.   Gastrointestinal:  Negative for abdominal pain, blood in stool, constipation, diarrhea, nausea and vomiting.   Musculoskeletal:  Positive for back pain.   Neurological:  Negative for dizziness, syncope, light-headedness and headaches.     Medical History Reviewed by provider this encounter:  Tobacco  Allergies  Meds  Problems  Med Hx  Surg Hx  Fam Hx       Current Outpatient Medications on File Prior to Visit   Medication Sig Dispense Refill    [DISCONTINUED] levETIRAcetam (KEPPRA) 750 mg tablet Take 1 tablet by mouth twice daily 60 tablet 0    [DISCONTINUED] lisinopril (ZESTRIL) 20 mg tablet Take 1 tablet (20 mg total) by mouth daily 90 tablet 1    nicotine (NICODERM CQ) 21 mg/24 hr TD 24 hr patch Place 1 patch on the skin over 24 hours every 24 hours (Patient not taking: Reported on 7/10/2024) 28 patch 1     No current facility-administered medications on file prior to visit.      Social History     Tobacco Use    Smoking status: Every Day     Current packs/day: 1.00     Types: Cigarettes    Smokeless tobacco: Never   Vaping Use    Vaping status: Never Used   Substance and Sexual Activity    Alcohol use: Yes     Comment: 2 x month    Drug use: No    Sexual activity: Yes     Partners: Female         Objective     /70 (BP Location: Left arm, Patient Position: Sitting, Cuff Size: Large)   Pulse 70   Temp 98.2 °F (36.8 °C)   Resp 18   Ht 5' 10\" (1.778 m)   Wt 88.3 kg (194 lb 9.6 oz)   SpO2 94%   BMI 27.92 kg/m²     Physical Exam  Vitals and nursing note reviewed.   Constitutional:       General: He is not in acute " distress.     Appearance: Normal appearance.   HENT:      Head: Normocephalic and atraumatic.   Eyes:      Extraocular Movements: Extraocular movements intact.      Conjunctiva/sclera: Conjunctivae normal.      Pupils: Pupils are equal, round, and reactive to light.   Cardiovascular:      Rate and Rhythm: Normal rate and regular rhythm.      Heart sounds: Normal heart sounds. No murmur heard.  Pulmonary:      Effort: Pulmonary effort is normal. No respiratory distress.      Breath sounds: Normal breath sounds. No wheezing.   Musculoskeletal:      Cervical back: Normal range of motion and neck supple.      Right lower leg: No edema.      Left lower leg: No edema.      Comments: Midline lumbar scar from prior discectomy.  Erythematous rash consistent with irritation from icy hot patches.  R paraspinal muscle TTP.   Skin:     General: Skin is warm and dry.   Neurological:      General: No focal deficit present.      Mental Status: He is alert and oriented to person, place, and time.      Cranial Nerves: No cranial nerve deficit.      Motor: No weakness.   Psychiatric:         Mood and Affect: Mood normal.         Behavior: Behavior normal.

## 2024-11-12 NOTE — ASSESSMENT & PLAN NOTE
Orders:    levETIRAcetam (KEPPRA) 750 mg tablet; Take 1 tablet (750 mg total) by mouth 2 (two) times a day

## 2025-03-12 NOTE — PATIENT INSTRUCTIONS
Lower Back Exercises   AMBULATORY CARE:   Lower back exercises  help heal and strengthen your back muscles to prevent another injury  Ask your healthcare provider if you need to see a physical therapist for more advanced exercises  Seek care immediately if:   You have severe pain that prevents you from moving  Call your doctor if:   Your pain becomes worse  You have new pain  You have questions or concerns about your condition or care  Do lower back exercises safely:   Do the exercises on a mat or firm surface (not on a bed)  A firm surface will support your spine and prevent low back pain  Move slowly and smoothly  Avoid fast or jerky motions  Breathe normally  Do not hold your breath  Stop if you feel pain  It is normal to feel some discomfort at first, but you should not feel pain  Regular exercise will help decrease your discomfort over time  Lower back exercises: Your healthcare provider may recommend that you do back exercises 10 to 30 minutes each day  He or she may also recommend that you do exercises 1 to 3 times each day  Ask your provider which exercises are best for you and how often to do them  Ankle pumps:  Lie on your back  Move your foot up (with your toes pointing toward your head)  Then, move your foot down (with your toes pointing away from you)  Repeat this exercise 10 times on each side  Heel slides:  Lie on your back  Slowly bend one leg and then straighten it  Next, bend the other leg and then straighten it  Repeat 10 times on each side  Pelvic tilt:  Lie on your back with your knees bent and feet flat on the floor  Place your arms in a relaxed position beside your body  Tighten the muscles of your abdomen and flatten your back against the floor  Hold for 5 seconds  Repeat 5 times  Back stretch:  Lie on your back with your hands behind your head  Bend your knees and turn the lower half of your body to one side   Hold this position for 10 seconds  Repeat 3 times on each side  Straight leg raises:  Lie on your back with one leg straight  Bend the other knee  Tighten your abdomen and then slowly lift the straight leg up about 6 to 12 inches off the floor  Hold for 1 to 5 seconds  Lower your leg slowly  Repeat 10 times on each leg  Knee-to-chest:  Lie on your back with your knees bent and feet flat on the floor  Pull one of your knees toward your chest and hold it there for 5 seconds  Return your leg to the starting position  Lift the other knee toward your chest and hold for 5 seconds  Do this 5 times on each side  Cat and camel:  Place your hands and knees on the floor  Arch your back upward toward the ceiling and lower your head  Round out your spine as much as you can  Hold for 5 seconds  Lift your head upward and push your chest downward toward the floor  Hold for 5 seconds  Do 3 sets or as directed  Wall squats:  Stand with your back against a wall  Tighten the muscles of your abdomen  Slowly lower your body until your knees are bent at a 45 degree angle  Hold this position for 5 seconds  Slowly move back up to a standing position  Repeat 10 times  Curl up:  Lie on your back with your knees bent and feet flat on the floor  Place your hands, palms down, underneath the curve in your lower back  Next, with your elbows on the floor, lift your shoulders and chest 2 to 3 inches  Keep your head in line with your shoulders  Hold this position for 5 seconds  When you can do this exercise without pain for 10 to 15 seconds, you may add a rotation  While your shoulders and chest are lifted off the ground, turn slightly to the left and hold  Repeat on the other side  Bird dog:  Place your hands and knees on the floor  Keep your wrists directly below your shoulders and your knees directly below your hips  Pull your belly button in toward your spine  Do not flatten or arch your back  Tighten your abdominal muscles  Raise one arm straight out so that it is aligned with your head  Next, raise the leg opposite your arm  Hold this position for 15 seconds  Lower your arm and leg slowly and change sides  Do 5 sets  Follow up with your doctor as directed:  Write down your questions so you remember to ask them during your visits  © Copyright [employer] 2022 Information is for End User's use only and may not be sold, redistributed or otherwise used for commercial purposes  The above information is an  only  It is not intended as medical advice for individual conditions or treatments  Talk to your doctor, nurse or pharmacist before following any medical regimen to see if it is safe and effective for you  MACKENZIE Wolf RN/ STAYC Rowe RN

## 2025-05-13 DIAGNOSIS — I10 ESSENTIAL HYPERTENSION: ICD-10-CM

## 2025-05-13 DIAGNOSIS — R56.9 SEIZURES (HCC): ICD-10-CM

## 2025-05-13 RX ORDER — LISINOPRIL 20 MG/1
20 TABLET ORAL DAILY
Qty: 90 TABLET | Refills: 0 | Status: SHIPPED | OUTPATIENT
Start: 2025-05-13

## 2025-05-13 RX ORDER — LEVETIRACETAM 750 MG/1
750 TABLET ORAL 2 TIMES DAILY
Qty: 180 TABLET | Refills: 0 | Status: SHIPPED | OUTPATIENT
Start: 2025-05-13

## 2025-06-26 ENCOUNTER — VBI (OUTPATIENT)
Dept: ADMINISTRATIVE | Facility: OTHER | Age: 45
End: 2025-06-26
